# Patient Record
Sex: FEMALE | Race: WHITE | Employment: UNEMPLOYED
[De-identification: names, ages, dates, MRNs, and addresses within clinical notes are randomized per-mention and may not be internally consistent; named-entity substitution may affect disease eponyms.]

---

## 2017-04-13 ENCOUNTER — HOSPITAL ENCOUNTER (OUTPATIENT)
Dept: MRI IMAGING | Facility: CLINIC | Age: 82
Discharge: HOME | End: 2017-04-13
Admitting: INTERNAL MEDICINE
Payer: MEDICARE

## 2017-04-13 DIAGNOSIS — G44.001 INTRACTABLE CLUSTER HEADACHE SYNDROME, UNSPECIFIED CHRONICITY PATTERN: ICD-10-CM

## 2017-04-13 LAB — POC CREATININE: 0.9 MG/DL (ref 0.6–1.4)

## 2017-04-13 PROCEDURE — 82565 ASSAY OF CREATININE: CPT

## 2017-04-13 PROCEDURE — A9579 GAD-BASE MR CONTRAST NOS,1ML: HCPCS | Performed by: INTERNAL MEDICINE

## 2017-04-13 PROCEDURE — 6360000004 HC RX CONTRAST MEDICATION: Performed by: INTERNAL MEDICINE

## 2017-04-13 PROCEDURE — 70553 MRI BRAIN STEM W/O & W/DYE: CPT

## 2017-04-13 RX ADMIN — GADOPENTETATE DIMEGLUMINE 12 ML: 469.01 INJECTION INTRAVENOUS at 09:07

## 2017-04-19 ENCOUNTER — TELEPHONE (OUTPATIENT)
Dept: ONCOLOGY | Age: 82
End: 2017-04-19

## 2017-04-27 ENCOUNTER — HOSPITAL ENCOUNTER (OUTPATIENT)
Dept: INFUSION THERAPY | Facility: MEDICAL CENTER | Age: 82
Discharge: HOME | End: 2017-04-27
Payer: MEDICARE

## 2017-04-27 DIAGNOSIS — D89.9 UNSPECIFIED DISORDER OF IMMUNE MECHANISM: ICD-10-CM

## 2017-04-27 DIAGNOSIS — C50.411 MALIGNANT NEOPLASM OF UPPER-OUTER QUADRANT OF RIGHT FEMALE BREAST (HCC): ICD-10-CM

## 2017-04-27 PROCEDURE — 2580000003 HC RX 258: Performed by: FAMILY MEDICINE

## 2017-04-27 PROCEDURE — 96366 THER/PROPH/DIAG IV INF ADDON: CPT

## 2017-04-27 PROCEDURE — 96365 THER/PROPH/DIAG IV INF INIT: CPT

## 2017-04-27 PROCEDURE — 2500000003 HC RX 250 WO HCPCS: Performed by: INTERNAL MEDICINE

## 2017-04-27 RX ORDER — SODIUM CHLORIDE 0.9 % (FLUSH) 0.9 %
10 SYRINGE (ML) INJECTION PRN
Status: CANCELLED | OUTPATIENT
Start: 2017-04-27

## 2017-04-27 RX ORDER — SODIUM CHLORIDE 9 MG/ML
INJECTION, SOLUTION INTRAVENOUS ONCE
Status: COMPLETED | OUTPATIENT
Start: 2017-04-27 | End: 2017-04-27

## 2017-04-27 RX ORDER — SODIUM CHLORIDE 9 MG/ML
INJECTION, SOLUTION INTRAVENOUS ONCE
Status: CANCELLED | OUTPATIENT
Start: 2017-04-27

## 2017-04-27 RX ADMIN — MAGNESIUM CHLORIDE: 200 INJECTION INTRAVENOUS at 10:22

## 2017-04-27 RX ADMIN — SODIUM CHLORIDE: 9 INJECTION, SOLUTION INTRAVENOUS at 10:23

## 2017-04-27 NOTE — PROGRESS NOTES
Moustapha Sanchez arrives ambulatory with daughter  Orders reviewed for vitamin C infusion  Iv started with #22 cathlon to lt forearm   Good blood return noted  See [facility] for ascorbic acid infusion. After approximately 20 minutes, Moustapha Sanchez c/o pain to iv site.   Infiltrate noted and iv line discontinued with needle intact  Pressure dressing applied  Iv line re started with #22 cathlon to rt hand   Good blood return noted  See MAR for ascorbic acid infusion  Tolerated well  Denies complaints  Iv line discontinued with needle intact  Pressure dressing applied  Discharged per ambulatory with daughter

## 2017-05-04 ENCOUNTER — HOSPITAL ENCOUNTER (OUTPATIENT)
Dept: INFUSION THERAPY | Facility: MEDICAL CENTER | Age: 82
Discharge: HOME | End: 2017-05-04
Payer: MEDICARE

## 2017-05-04 VITALS
RESPIRATION RATE: 20 BRPM | TEMPERATURE: 97.6 F | HEART RATE: 64 BPM | DIASTOLIC BLOOD PRESSURE: 66 MMHG | SYSTOLIC BLOOD PRESSURE: 132 MMHG

## 2017-05-04 DIAGNOSIS — D89.9 UNSPECIFIED DISORDER OF IMMUNE MECHANISM: ICD-10-CM

## 2017-05-04 DIAGNOSIS — C50.411 MALIGNANT NEOPLASM OF UPPER-OUTER QUADRANT OF RIGHT FEMALE BREAST (HCC): ICD-10-CM

## 2017-05-04 PROCEDURE — 96366 THER/PROPH/DIAG IV INF ADDON: CPT

## 2017-05-04 PROCEDURE — 2500000003 HC RX 250 WO HCPCS: Performed by: INTERNAL MEDICINE

## 2017-05-04 PROCEDURE — 96365 THER/PROPH/DIAG IV INF INIT: CPT

## 2017-05-04 RX ORDER — SODIUM CHLORIDE 9 MG/ML
INJECTION, SOLUTION INTRAVENOUS ONCE
Status: CANCELLED | OUTPATIENT
Start: 2017-05-04

## 2017-05-04 RX ORDER — SODIUM CHLORIDE 0.9 % (FLUSH) 0.9 %
10 SYRINGE (ML) INJECTION PRN
Status: CANCELLED | OUTPATIENT
Start: 2017-05-04

## 2017-05-04 RX ADMIN — MAGNESIUM CHLORIDE: 200 INJECTION INTRAVENOUS at 09:28

## 2017-05-04 NOTE — PROGRESS NOTES
Patient arrives to the clinic for infusion today. No complaints. Patient's daughter is with her. IV started into the right hand with # 24 protective. Good blood return obtained. Ascorbic acid infused over 2 .5 hrs. Patient tolerated infusion. IV discontinued and patient leaves in stable condition with daughter.

## 2017-05-11 ENCOUNTER — HOSPITAL ENCOUNTER (OUTPATIENT)
Dept: INFUSION THERAPY | Facility: MEDICAL CENTER | Age: 82
Discharge: HOME | End: 2017-05-11
Payer: MEDICARE

## 2017-05-11 VITALS
HEART RATE: 63 BPM | SYSTOLIC BLOOD PRESSURE: 129 MMHG | DIASTOLIC BLOOD PRESSURE: 63 MMHG | TEMPERATURE: 97.4 F | RESPIRATION RATE: 18 BRPM

## 2017-05-11 DIAGNOSIS — C50.411 MALIGNANT NEOPLASM OF UPPER-OUTER QUADRANT OF RIGHT FEMALE BREAST (HCC): ICD-10-CM

## 2017-05-11 DIAGNOSIS — D89.9 UNSPECIFIED DISORDER OF IMMUNE MECHANISM: ICD-10-CM

## 2017-05-11 PROCEDURE — 96366 THER/PROPH/DIAG IV INF ADDON: CPT

## 2017-05-11 PROCEDURE — 2500000003 HC RX 250 WO HCPCS: Performed by: INTERNAL MEDICINE

## 2017-05-11 PROCEDURE — 96365 THER/PROPH/DIAG IV INF INIT: CPT

## 2017-05-11 PROCEDURE — 2580000003 HC RX 258: Performed by: INTERNAL MEDICINE

## 2017-05-11 RX ORDER — SODIUM CHLORIDE 9 MG/ML
INJECTION, SOLUTION INTRAVENOUS ONCE
Status: CANCELLED | OUTPATIENT
Start: 2017-05-11

## 2017-05-11 RX ORDER — SODIUM CHLORIDE 0.9 % (FLUSH) 0.9 %
10 SYRINGE (ML) INJECTION PRN
Status: CANCELLED | OUTPATIENT
Start: 2017-05-11

## 2017-05-11 RX ORDER — SODIUM CHLORIDE 0.9 % (FLUSH) 0.9 %
10 SYRINGE (ML) INJECTION PRN
Status: DISCONTINUED | OUTPATIENT
Start: 2017-05-11 | End: 2017-05-12 | Stop reason: HOSPADM

## 2017-05-11 RX ORDER — SODIUM CHLORIDE 9 MG/ML
INJECTION, SOLUTION INTRAVENOUS ONCE
Status: COMPLETED | OUTPATIENT
Start: 2017-05-11 | End: 2017-05-11

## 2017-05-11 RX ADMIN — SODIUM CHLORIDE: 9 INJECTION, SOLUTION INTRAVENOUS at 09:00

## 2017-05-11 RX ADMIN — MAGNESIUM CHLORIDE: 200 INJECTION INTRAVENOUS at 09:38

## 2017-05-11 NOTE — IP AVS SNAPSHOT
Patient Information     Patient Name SHERRI Palomares 1934         This is your updated medication list to keep with you all times      ASK your doctor about these medications     ARIMIDEX 1 MG tablet   Generic drug:  anastrozole       XANAX 0.5 MG tablet   Generic drug:  ALPRAZolam

## 2017-05-11 NOTE — IP AVS SNAPSHOT
After Visit Summary  (Discharge Instructions)    Medication List for Home    Based on the information you provided to us as well as any changes during this visit, the following is your updated medication list.  Compare this with your prescription bottles at home. If you have any questions or concerns, contact your primary care physician's office. Daily Medication List (This medication list can be shared with any healthcare provider who is helping you manage your medications)      ASK your doctor about these medications if you have questions        Last Dose    Next Dose Due AM NOON PM NIGHT    ARIMIDEX 1 MG tablet   Generic drug:  anastrozole   Take 1 mg by mouth every other day Pt states 4 times a week                                         XANAX 0.5 MG tablet   Generic drug:  ALPRAZolam   nightly as needed                                                 Allergies as of 5/11/2017     No Known Allergies      Immunizations as of 5/11/2017     No immunizations on file. Last Vitals          Most Recent Value    Temp  97.4 °F (36.3 °C)    Pulse  63    Resp  18    BP  129/63         After Visit Summary    This summary was created for you. Thank you for entrusting your care to us. The following information includes details about your hospital/visit stay along with steps you should take to help with your recovery once you leave the hospital.  In this packet, you will find information about the topics listed below:    · Instructions about your medications including a list of your home medications  · A summary of your hospital visit  · Follow-up appointments once you have left the hospital  · Your care plan at home      You may receive a survey regarding the care you received during your stay. Your input is valuable to us. We encourage you to complete and return your survey in the envelope provided. We hope you will choose us in the future for your healthcare needs. Patient Information     Patient Name SHERRI Cheng 1934      Care Provided at:     Name             [address] [facility] [address]              Your Visit    Here you will find information about your visit, including the reason for your visit. Please take this sheet with you when you visit your doctor or other health care provider in the future. It will help determine the best possible medical care for you at that time. If you have any questions once you leave the hospital, please call the department phone number listed below. Why you were here     Your primary diagnosis was:  Not on File      Visit Information     Date & Time [facility] Phone    [facility]  [phone]       Follow-up Appointments    Below is a list of your follow-up and future appointments. This may not be a complete list as you may have made appointments directly with providers that we are not aware of or your providers may have made some for you. Please call your providers to confirm appointments. It is important to keep your appointments. Please bring your current insurance card, photo ID, co-pay, and all medication bottles to your appointment. If self-pay, payment is expected at the time of service.         Future Appointments     2017 8:30 AM     Appointment with [facility] at [facility]  [phone]       2017 8:30 AM     [facility]  ([phone][facility]   [facility] [place]       2017 8:30 AM     Appointment with [facility] ST[facility] CHA[facility] 18 at . [place] [address] ([phone])   [address] Once You Return Home    This section includes instructions you will need to follow once you leave the hospital.  Your care team will discuss these with you, so you and those caring for you know how to best care for your health needs at home. This section may also include educational information about certain health topics that may be of help to you. MyChart Signup     Impressto allows you to send messages to your doctor, view your test results, renew your prescriptions, schedule appointments, view visit notes, and more. How Do I Sign Up? 1. In your Internet browser, go to [email or web address]  2. Click on the Sign Up Now link in the Sign In box. You will see the New Member Sign Up page. 3. Enter your Impressto Access Code exactly as it appears below. You will not need to use this code after youve completed the sign-up process. If you do not sign up before the expiration date, you must request a new code. Impressto Access Code: [email or web address]  Expires: 7/10/2017  9:07 AM    4. Enter your Social Security Number ([id number]) and Date of Birth (mm/dd/yyyy) as indicated and click Submit. You will be taken to the next sign-up page. 5. Create a Impressto ID. This will be your Impressto login ID and cannot be changed, so think of one that is secure and easy to remember. 6. Create a Impressto password. You can change your password at any time. 7. Enter your Password Reset Question and Answer. This can be used at a later time if you forget your password. 8. Enter your e-mail address. You will receive e-mail notification when new information is available in [employer]. 9. Click Sign Up. You can now view your medical record. Additional Information  If you have questions, please contact the physician practice where you receive care. Remember, Impressto is NOT to be used for urgent needs. For medical emergencies, dial 911. For questions regarding your Impressto account call [phone]. If you have a clinical question, please call your doctor's office.          View your information online ? Review your current list of  medications, immunization, and allergies. ? Review your future test results online . ? Review your discharge instructions provided by your caregivers at discharge    Certain functionality such as prescription refills, scheduling appointments or sending messages to your provider are not activated if your provider does not use [facility] in his/her office    For questions regarding your MyChart account call [phone]. If you have a clinical question, please call your doctor's office. The information on all pages of the After Visit Summary has been reviewed with me, the patient and/or responsible adult, by my health care provider(s). I had the opportunity to ask questions regarding this information. I understand I should dispose of my armband safely at home to protect my health information. A complete copy of the After Visit Summary has been given to me, the patient and/or responsible adult.            Patient Signature/Responsible Adult:____________________    Clinician Signature:_____________________    Date:_____________________    Time:_____________________

## 2017-05-11 NOTE — PROGRESS NOTES
Pt here with her daughter for ascorbic acid over 2 hours. Obtained iv without difficulty, excellent blood return noted. Ordered medication. Infused ascorbic acid 70 grams, magnesium chloride 4 ml over 3 hours. Flushed line, discontinued iv without any complications. Pt back in one week.

## 2017-05-18 ENCOUNTER — HOSPITAL ENCOUNTER (OUTPATIENT)
Dept: INFUSION THERAPY | Facility: MEDICAL CENTER | Age: 82
Discharge: HOME | End: 2017-05-18
Payer: MEDICARE

## 2017-05-18 VITALS
DIASTOLIC BLOOD PRESSURE: 55 MMHG | RESPIRATION RATE: 20 BRPM | TEMPERATURE: 97.7 F | HEART RATE: 63 BPM | SYSTOLIC BLOOD PRESSURE: 110 MMHG

## 2017-05-18 DIAGNOSIS — D89.9 UNSPECIFIED DISORDER OF IMMUNE MECHANISM: ICD-10-CM

## 2017-05-18 DIAGNOSIS — C50.411 MALIGNANT NEOPLASM OF UPPER-OUTER QUADRANT OF RIGHT FEMALE BREAST (HCC): ICD-10-CM

## 2017-05-18 PROCEDURE — 2580000003 HC RX 258: Performed by: INTERNAL MEDICINE

## 2017-05-18 PROCEDURE — 96365 THER/PROPH/DIAG IV INF INIT: CPT

## 2017-05-18 PROCEDURE — 2500000003 HC RX 250 WO HCPCS: Performed by: INTERNAL MEDICINE

## 2017-05-18 PROCEDURE — 96366 THER/PROPH/DIAG IV INF ADDON: CPT

## 2017-05-18 RX ORDER — SODIUM CHLORIDE 9 MG/ML
INJECTION, SOLUTION INTRAVENOUS ONCE
Status: COMPLETED | OUTPATIENT
Start: 2017-05-18 | End: 2017-05-18

## 2017-05-18 RX ORDER — SODIUM CHLORIDE 9 MG/ML
INJECTION, SOLUTION INTRAVENOUS ONCE
Status: CANCELLED | OUTPATIENT
Start: 2017-05-18

## 2017-05-18 RX ORDER — SODIUM CHLORIDE 0.9 % (FLUSH) 0.9 %
10 SYRINGE (ML) INJECTION PRN
Status: CANCELLED | OUTPATIENT
Start: 2017-05-18

## 2017-05-18 RX ADMIN — SODIUM CHLORIDE: 9 INJECTION, SOLUTION INTRAVENOUS at 09:09

## 2017-05-18 RX ADMIN — MAGNESIUM CHLORIDE: 200 INJECTION INTRAVENOUS at 09:08

## 2017-05-18 NOTE — PLAN OF CARE
Problem: Mobility - Impaired:  Intervention: Manage a safe environment  Call light with in reach. All wheels locked on recliner chair to prevent falls. Patient leaves without injury. Intervention: Appropriate assistance to ensure safe transfer  Patient assisted with ambulation to room  Assisted to bathroom when needed.

## 2017-05-18 NOTE — PROGRESS NOTES
Patient arrives to the clinic for infusion of vit C. Complaints of fatigue. Patient has her daughter with her. IV started into the right forearm with # 22 protective. Good blood return obtained. Vitamin c infused over 2.5 hrs. Patient tolerated. IV discontinued and patient leaves in stable condition.

## 2017-05-25 ENCOUNTER — HOSPITAL ENCOUNTER (OUTPATIENT)
Dept: INFUSION THERAPY | Facility: MEDICAL CENTER | Age: 82
Discharge: HOME | End: 2017-05-25
Payer: MEDICARE

## 2017-05-25 VITALS
RESPIRATION RATE: 18 BRPM | TEMPERATURE: 97.7 F | SYSTOLIC BLOOD PRESSURE: 112 MMHG | DIASTOLIC BLOOD PRESSURE: 56 MMHG | HEART RATE: 64 BPM

## 2017-05-25 DIAGNOSIS — D89.9 UNSPECIFIED DISORDER OF IMMUNE MECHANISM: ICD-10-CM

## 2017-05-25 DIAGNOSIS — C50.411 MALIGNANT NEOPLASM OF UPPER-OUTER QUADRANT OF RIGHT FEMALE BREAST (HCC): ICD-10-CM

## 2017-05-25 PROCEDURE — 2580000003 HC RX 258: Performed by: INTERNAL MEDICINE

## 2017-05-25 PROCEDURE — 96365 THER/PROPH/DIAG IV INF INIT: CPT

## 2017-05-25 PROCEDURE — 96366 THER/PROPH/DIAG IV INF ADDON: CPT

## 2017-05-25 PROCEDURE — 2500000003 HC RX 250 WO HCPCS: Performed by: INTERNAL MEDICINE

## 2017-05-25 RX ORDER — SODIUM CHLORIDE 9 MG/ML
INJECTION, SOLUTION INTRAVENOUS ONCE
Status: COMPLETED | OUTPATIENT
Start: 2017-05-25 | End: 2017-05-25

## 2017-05-25 RX ORDER — SODIUM CHLORIDE 9 MG/ML
INJECTION, SOLUTION INTRAVENOUS ONCE
Status: CANCELLED | OUTPATIENT
Start: 2017-05-25

## 2017-05-25 RX ORDER — SODIUM CHLORIDE 0.9 % (FLUSH) 0.9 %
10 SYRINGE (ML) INJECTION PRN
Status: CANCELLED | OUTPATIENT
Start: 2017-05-25

## 2017-05-25 RX ADMIN — MAGNESIUM CHLORIDE: 200 INJECTION INTRAVENOUS at 09:08

## 2017-05-25 RX ADMIN — SODIUM CHLORIDE: 9 INJECTION, SOLUTION INTRAVENOUS at 09:06

## 2017-05-25 NOTE — PROGRESS NOTES
Pt here with her daughterfor ascorbic acid 70 grams magnesium chloride in 700 ml of sterile water for breast cancer. Obtained iv without problems, checked treatment with 2 rn prior to start. Pt completed without difficulty, flushed line, discontinued iv, no problems at site. Pt back next week for treatment.

## 2017-05-25 NOTE — PLAN OF CARE
Problem: Safety:  Goal: Free from accidental physical injury  Free from accidental physical injury  Outcome: Met This Shift  Recliner locks all in locked positions, call light within reach.

## 2017-05-25 NOTE — IP AVS SNAPSHOT
Patient Information     Patient Name SHERRI Silverman 1934         This is your updated medication list to keep with you all times      ASK your doctor about these medications     ARIMIDEX 1 MG tablet   Generic drug:  anastrozole       XANAX 0.5 MG tablet   Generic drug:  ALPRAZolam

## 2017-05-25 NOTE — IP AVS SNAPSHOT
After Visit Summary  (Discharge Instructions)    Medication List for Home    Based on the information you provided to us as well as any changes during this visit, the following is your updated medication list.  Compare this with your prescription bottles at home. If you have any questions or concerns, contact your primary care physician's office. Daily Medication List (This medication list can be shared with any healthcare provider who is helping you manage your medications)      ASK your doctor about these medications if you have questions        Last Dose    Next Dose Due AM NOON PM NIGHT    ARIMIDEX 1 MG tablet   Generic drug:  anastrozole   Take 1 mg by mouth every other day Pt states 4 times a week                                         XANAX 0.5 MG tablet   Generic drug:  ALPRAZolam   nightly as needed                                                 Allergies as of 5/25/2017     No Known Allergies      Immunizations as of 5/25/2017     No immunizations on file. Last Vitals          Most Recent Value    Temp  97.7 °F (36.5 °C)    Pulse  64    Resp  18    BP  (!)  112/56         After Visit Summary    This summary was created for you. Thank you for entrusting your care to us. The following information includes details about your hospital/visit stay along with steps you should take to help with your recovery once you leave the hospital.  In this packet, you will find information about the topics listed below:    · Instructions about your medications including a list of your home medications  · A summary of your hospital visit  · Follow-up appointments once you have left the hospital  · Your care plan at home      You may receive a survey regarding the care you received during your stay. Your input is valuable to us. We encourage you to complete and return your survey in the envelope provided. We hope you will choose us in the future for your healthcare needs. Patient Information     Patient Name SHERRI Cheng 1934      Care Provided at:     Name             [address] [facility] [address]              Your Visit    Here you will find information about your visit, including the reason for your visit. Please take this sheet with you when you visit your doctor or other health care provider in the future. It will help determine the best possible medical care for you at that time. If you have any questions once you leave the hospital, please call the department phone number listed below. Why you were here     Your primary diagnosis was:  Not on File      Visit Information     Date & Time Department Dept. Phone    2017 Jaylan Malik [phone]       Follow-up Appointments    Below is a list of your follow-up and future appointments. This may not be a complete list as you may have made appointments directly with providers that we are not aware of or your providers may have made some for you. Please call your providers to confirm appointments. It is important to keep your appointments. Please bring your current insurance card, photo ID, co-pay, and all medication bottles to your appointment. If self-pay, payment is expected at the time of service. Future Appointments     2017 8:30 AM     Appointment with BHARAT ROSA 18 at . [place] ([phone])   [address] Once You Return Home    This section includes instructions you will need to follow once you leave the hospital.  Your care team will discuss these with you, so you and those caring for you know how to best care for your health needs at home. This section may also include educational information about certain health topics that may be of help to you.             Wizet Signup     [employer] allows you to send messages to your doctor, view your test results, renew your prescriptions, schedule appointments, view visit notes, and more. How Do I Sign Up? 1. In your Internet browser, go to [email or web address]  2. Click on the Sign Up Now link in the Sign In box. You will see the New Member Sign Up page. 3. Enter your Ozmosist Access Code exactly as it appears below. You will not need to use this code after youve completed the sign-up process. If you do not sign up before the expiration date, you must request a new code. OneFineMeal Access Code: [email or web address]  Expires: 7/10/2017  9:07 AM    4. Enter your Social Security Number ([id number]) and Date of Birth (mm/dd/yyyy) as indicated and click Submit. You will be taken to the next sign-up page. 5. Create a OneFineMeal ID. This will be your OneFineMeal login ID and cannot be changed, so think of one that is secure and easy to remember. 6. Create a OneFineMeal password. You can change your password at any time. 7. Enter your Password Reset Question and Answer. This can be used at a later time if you forget your password. 8. Enter your e-mail address. You will receive e-mail notification when new information is available in [address]. 9. Click Sign Up. You can now view your medical record. Additional Information  If you have questions, please contact the physician practice where you receive care. Remember, OneFineMeal is NOT to be used for urgent needs. For medical emergencies, dial 911. For questions regarding your OneFineMeal account call [phone]. If you have a clinical question, please call your doctor's office. View your information online  ? Review your current list of  medications, immunization, and allergies. ? Review your future test results online . ?  Review your discharge instructions provided by your caregivers at discharge    Certain functionality such as prescription refills, scheduling appointments or sending messages to your provider are not activated if your provider does not use [facility] in his/her office    For questions regarding your MyChart account call [phone]. If you have a clinical question, please call your doctor's office. The information on all pages of the After Visit Summary has been reviewed with me, the patient and/or responsible adult, by my health care provider(s). I had the opportunity to ask questions regarding this information. I understand I should dispose of my armband safely at home to protect my health information. A complete copy of the After Visit Summary has been given to me, the patient and/or responsible adult.            Patient Signature/Responsible Adult:____________________    Clinician Signature:_____________________    Date:_____________________    Time:_____________________

## 2017-06-02 ENCOUNTER — HOSPITAL ENCOUNTER (OUTPATIENT)
Dept: INFUSION THERAPY | Facility: MEDICAL CENTER | Age: 82
Discharge: HOME | End: 2017-06-02
Payer: MEDICARE

## 2017-06-02 VITALS
DIASTOLIC BLOOD PRESSURE: 63 MMHG | TEMPERATURE: 97.6 F | HEART RATE: 58 BPM | RESPIRATION RATE: 20 BRPM | SYSTOLIC BLOOD PRESSURE: 119 MMHG

## 2017-06-02 DIAGNOSIS — C50.411 MALIGNANT NEOPLASM OF UPPER-OUTER QUADRANT OF RIGHT FEMALE BREAST (HCC): ICD-10-CM

## 2017-06-02 DIAGNOSIS — D89.9 UNSPECIFIED DISORDER OF IMMUNE MECHANISM: ICD-10-CM

## 2017-06-02 PROCEDURE — 2580000003 HC RX 258: Performed by: INTERNAL MEDICINE

## 2017-06-02 PROCEDURE — 2500000003 HC RX 250 WO HCPCS: Performed by: INTERNAL MEDICINE

## 2017-06-02 PROCEDURE — 96365 THER/PROPH/DIAG IV INF INIT: CPT

## 2017-06-02 PROCEDURE — 96366 THER/PROPH/DIAG IV INF ADDON: CPT

## 2017-06-02 RX ORDER — SODIUM CHLORIDE 9 MG/ML
INJECTION, SOLUTION INTRAVENOUS ONCE
Status: COMPLETED | OUTPATIENT
Start: 2017-06-02 | End: 2017-06-02

## 2017-06-02 RX ORDER — SODIUM CHLORIDE 9 MG/ML
INJECTION, SOLUTION INTRAVENOUS ONCE
Status: CANCELLED | OUTPATIENT
Start: 2017-06-02

## 2017-06-02 RX ORDER — SODIUM CHLORIDE 0.9 % (FLUSH) 0.9 %
10 SYRINGE (ML) INJECTION PRN
Status: CANCELLED | OUTPATIENT
Start: 2017-06-02

## 2017-06-02 RX ADMIN — SODIUM CHLORIDE: 9 INJECTION, SOLUTION INTRAVENOUS at 09:00

## 2017-06-02 RX ADMIN — MAGNESIUM CHLORIDE: 200 INJECTION INTRAVENOUS at 09:10

## 2017-06-02 NOTE — PROGRESS NOTES
Patient arrives to the clinic for hydration today. Patient has her family member with her. Patient complaints of fatigue. IV started into the left forearm with # 24 protective. Good blood return obtained. Vitamin C with magnesium infused over 2.5 hrs. Patient tolerated. No signs of infiltration at the IV site. Patient leaves in stable condition. IV discontinued.

## 2017-06-02 NOTE — PLAN OF CARE
Problem: Mobility - Impaired:  Intervention: Manage a safe environment  Call light is within reach. Recliner chair wheels are in a locked position.

## 2017-06-08 ENCOUNTER — HOSPITAL ENCOUNTER (OUTPATIENT)
Dept: INFUSION THERAPY | Facility: MEDICAL CENTER | Age: 82
Discharge: HOME | End: 2017-06-08
Payer: MEDICARE

## 2017-06-08 VITALS
DIASTOLIC BLOOD PRESSURE: 65 MMHG | RESPIRATION RATE: 20 BRPM | TEMPERATURE: 98.2 F | SYSTOLIC BLOOD PRESSURE: 120 MMHG | HEART RATE: 76 BPM

## 2017-06-08 DIAGNOSIS — D89.9 UNSPECIFIED DISORDER OF IMMUNE MECHANISM: ICD-10-CM

## 2017-06-08 DIAGNOSIS — C50.411 MALIGNANT NEOPLASM OF UPPER-OUTER QUADRANT OF RIGHT FEMALE BREAST (HCC): ICD-10-CM

## 2017-06-08 PROCEDURE — 2500000003 HC RX 250 WO HCPCS: Performed by: INTERNAL MEDICINE

## 2017-06-08 PROCEDURE — 96360 HYDRATION IV INFUSION INIT: CPT

## 2017-06-08 PROCEDURE — 2580000003 HC RX 258: Performed by: INTERNAL MEDICINE

## 2017-06-08 PROCEDURE — 96361 HYDRATE IV INFUSION ADD-ON: CPT

## 2017-06-08 RX ORDER — SODIUM CHLORIDE 9 MG/ML
INJECTION, SOLUTION INTRAVENOUS CONTINUOUS
Status: DISCONTINUED | OUTPATIENT
Start: 2017-06-08 | End: 2017-06-09 | Stop reason: HOSPADM

## 2017-06-08 RX ORDER — SODIUM CHLORIDE 9 MG/ML
INJECTION, SOLUTION INTRAVENOUS ONCE
Status: CANCELLED | OUTPATIENT
Start: 2017-06-08

## 2017-06-08 RX ORDER — SODIUM CHLORIDE 0.9 % (FLUSH) 0.9 %
10 SYRINGE (ML) INJECTION PRN
Status: CANCELLED | OUTPATIENT
Start: 2017-06-08

## 2017-06-08 RX ADMIN — MAGNESIUM CHLORIDE: 200 INJECTION INTRAVENOUS at 09:14

## 2017-06-08 RX ADMIN — SODIUM CHLORIDE: 9 INJECTION, SOLUTION INTRAVENOUS at 09:11

## 2017-06-08 NOTE — PROGRESS NOTES
Pt here for Ascorbic Acid treatments for breast cancer. Pt has no complaints. Started iv without difficulty, excellent blood return. Started treatment at 350 ml/lhr per pt's request.  Pt completed at 191 0029. Flushed line and removed Iv without difficulty,.

## 2017-06-08 NOTE — IP AVS SNAPSHOT
After Visit Summary  (Discharge Instructions)    Medication List for Home    Based on the information you provided to us as well as any changes during this visit, the following is your updated medication list.  Compare this with your prescription bottles at home. If you have any questions or concerns, contact your primary care physician's office. Daily Medication List (This medication list can be shared with any healthcare provider who is helping you manage your medications)      ASK your doctor about these medications if you have questions        Last Dose    Next Dose Due AM NOON PM NIGHT    ARIMIDEX 1 MG tablet   Generic drug:  anastrozole   Take 1 mg by mouth every other day Pt states 4 times a week                                         XANAX 0.5 MG tablet   Generic drug:  ALPRAZolam   nightly as needed                                                 Allergies as of 6/8/2017     No Known Allergies      Immunizations as of 6/8/2017     No immunizations on file. Last Vitals          Most Recent Value    Temp  98.2 °F (36.8 °C)    Pulse  76    Resp  20    BP  120/65         After Visit Summary    This summary was created for you. Thank you for entrusting your care to us. The following information includes details about your hospital/visit stay along with steps you should take to help with your recovery once you leave the hospital.  In this packet, you will find information about the topics listed below:    · Instructions about your medications including a list of your home medications  · A summary of your hospital visit  · Follow-up appointments once you have left the hospital  · Your care plan at home      You may receive a survey regarding the care you received during your stay. Your input is valuable to us. We encourage you to complete and return your survey in the envelope provided. We hope you will choose us in the future for your healthcare needs.           Patient Information Patient Name [address] 1934      Care Provided at:     Name             [address] [facility] [address]              Your Visit    Here you will find information about your visit, including the reason for your visit. Please take this sheet with you when you visit your doctor or other health care provider in the future. It will help determine the best possible medical care for you at that time. If you have any questions once you leave the hospital, please call the department phone number listed below. Why you were here     Your primary diagnosis was:  Not on File      Visit Information     Date & Time Department Dept. Phone    6/8/2017 Steven Romero  [phone]       Follow-up Appointments    Below is a list of your follow-up and future appointments. This may not be a complete list as you may have made appointments directly with providers that we are not aware of or your providers may have made some for you. Please call your providers to confirm appointments. It is important to keep your appointments. Please bring your current insurance card, photo ID, co-pay, and all medication bottles to your appointment. If self-pay, payment is expected at the time of service. Future Appointments     6/15/2017 8:30 AM     Appointment with STV STA CHAIR 18 at . [place] [address] [phone] Counts include 234 beds at the [facility]       6/22/2017 8:30 AM     Appointment with STV STA CHAIR 05 at . [place] [address] [phone] Counts include 234 beds at the [facility]       6/29/2017 8:30 AM     [facility] ([phone][facility]   [facility]         Preventive Care        Date Due    Tetanus Combination Vaccine (1 - Tdap) 12/1/1953    Zoster Vaccine 12/1/1994    Osteoporosis screening or a bone density scan (Dexa) is recommended once at age 72.   Based upon the results and risk factors for bone loss, your provider will recommend whether this needs to be repeated. 12/1/1999    Pneumococcal Vaccines (two) for all adults aged 72 and over (1 of 2 - PCV13) 12/1/1999    Yearly Flu Vaccine (Season Ended) 8/1/2017                 Care Plan Once You Return Home    This section includes instructions you will need to follow once you leave the hospital.  Your care team will discuss these with you, so you and those caring for you know how to best care for your health needs at home. This section may also include educational information about certain health topics that may be of help to you. Vantix Diagnosticshart Signup     Powervation allows you to send messages to your doctor, view your test results, renew your prescriptions, schedule appointments, view visit notes, and more. How Do I Sign Up? 1. In your Internet browser, go to [email or web address]  2. Click on the Sign Up Now link in the Sign In box. You will see the New Member Sign Up page. 3. Enter your Powervation Access Code exactly as it appears below. You will not need to use this code after youve completed the sign-up process. If you do not sign up before the expiration date, you must request a new code. Powervation Access Code: [email or web address]  Expires: 7/10/2017  9:07 AM    4. Enter your Social Security Number (xxx-[id number]) and Date of Birth (mm/dd/yyyy) as indicated and click Submit. You will be taken to the next sign-up page. 5. Create a Powervation ID. This will be your Powervation login ID and cannot be changed, so think of one that is secure and easy to remember. 6. Create a Powervation password. You can change your password at any time. 7. Enter your Password Reset Question and Answer. This can be used at a later time if you forget your password. 8. Enter your e-mail address. You will receive e-mail notification when new information is available in [address] 9. Click Sign Up. You can now view your medical record.      Additional Information

## 2017-06-15 ENCOUNTER — HOSPITAL ENCOUNTER (OUTPATIENT)
Dept: INFUSION THERAPY | Facility: MEDICAL CENTER | Age: 82
Discharge: HOME | End: 2017-06-15
Payer: MEDICARE

## 2017-06-15 VITALS — HEART RATE: 64 BPM | RESPIRATION RATE: 18 BRPM | SYSTOLIC BLOOD PRESSURE: 123 MMHG | DIASTOLIC BLOOD PRESSURE: 67 MMHG

## 2017-06-15 DIAGNOSIS — D89.9 UNSPECIFIED DISORDER OF IMMUNE MECHANISM: ICD-10-CM

## 2017-06-15 DIAGNOSIS — C50.411 MALIGNANT NEOPLASM OF UPPER-OUTER QUADRANT OF RIGHT FEMALE BREAST (HCC): ICD-10-CM

## 2017-06-15 PROCEDURE — 2500000003 HC RX 250 WO HCPCS: Performed by: INTERNAL MEDICINE

## 2017-06-15 PROCEDURE — 96361 HYDRATE IV INFUSION ADD-ON: CPT

## 2017-06-15 PROCEDURE — 96360 HYDRATION IV INFUSION INIT: CPT

## 2017-06-15 PROCEDURE — 2580000003 HC RX 258: Performed by: INTERNAL MEDICINE

## 2017-06-15 RX ORDER — SODIUM CHLORIDE 0.9 % (FLUSH) 0.9 %
10 SYRINGE (ML) INJECTION PRN
Status: CANCELLED | OUTPATIENT
Start: 2017-06-15

## 2017-06-15 RX ORDER — SODIUM CHLORIDE 9 MG/ML
INJECTION, SOLUTION INTRAVENOUS ONCE
Status: COMPLETED | OUTPATIENT
Start: 2017-06-15 | End: 2017-06-15

## 2017-06-15 RX ORDER — SODIUM CHLORIDE 9 MG/ML
INJECTION, SOLUTION INTRAVENOUS ONCE
Status: CANCELLED | OUTPATIENT
Start: 2017-06-15

## 2017-06-15 RX ADMIN — SODIUM CHLORIDE: 9 INJECTION, SOLUTION INTRAVENOUS at 09:06

## 2017-06-15 RX ADMIN — MAGNESIUM CHLORIDE: 200 INJECTION INTRAVENOUS at 09:02

## 2017-06-15 NOTE — PROGRESS NOTES
Pt. Arrives for infusion , denies c/o since last infusion. IV started without difficulty  Ascorbic acid started to infuse @ 350ml/hr  Infusion completed without difficulty.

## 2017-06-15 NOTE — PLAN OF CARE
Problem: SAFETY  Goal: Free from accidental physical injury  Call light within reach, no recent falls assessment completed.

## 2017-06-22 ENCOUNTER — HOSPITAL ENCOUNTER (OUTPATIENT)
Dept: INFUSION THERAPY | Facility: MEDICAL CENTER | Age: 82
Discharge: HOME | End: 2017-06-22
Payer: MEDICARE

## 2017-06-22 VITALS
TEMPERATURE: 97.8 F | SYSTOLIC BLOOD PRESSURE: 119 MMHG | HEART RATE: 72 BPM | RESPIRATION RATE: 20 BRPM | DIASTOLIC BLOOD PRESSURE: 61 MMHG

## 2017-06-22 DIAGNOSIS — C50.411 MALIGNANT NEOPLASM OF UPPER-OUTER QUADRANT OF RIGHT FEMALE BREAST (HCC): ICD-10-CM

## 2017-06-22 DIAGNOSIS — D89.9 UNSPECIFIED DISORDER OF IMMUNE MECHANISM: ICD-10-CM

## 2017-06-22 PROCEDURE — 96361 HYDRATE IV INFUSION ADD-ON: CPT

## 2017-06-22 PROCEDURE — 96360 HYDRATION IV INFUSION INIT: CPT

## 2017-06-22 PROCEDURE — 2580000003 HC RX 258: Performed by: INTERNAL MEDICINE

## 2017-06-22 PROCEDURE — 2500000003 HC RX 250 WO HCPCS: Performed by: INTERNAL MEDICINE

## 2017-06-22 RX ORDER — SODIUM CHLORIDE 9 MG/ML
INJECTION, SOLUTION INTRAVENOUS ONCE
Status: COMPLETED | OUTPATIENT
Start: 2017-06-22 | End: 2017-06-22

## 2017-06-22 RX ORDER — SODIUM CHLORIDE 9 MG/ML
INJECTION, SOLUTION INTRAVENOUS ONCE
Status: CANCELLED | OUTPATIENT
Start: 2017-06-22

## 2017-06-22 RX ORDER — SODIUM CHLORIDE 0.9 % (FLUSH) 0.9 %
10 SYRINGE (ML) INJECTION PRN
Status: CANCELLED | OUTPATIENT
Start: 2017-06-22

## 2017-06-22 RX ADMIN — MAGNESIUM CHLORIDE: 200 INJECTION INTRAVENOUS at 09:19

## 2017-06-22 RX ADMIN — SODIUM CHLORIDE: 9 INJECTION, SOLUTION INTRAVENOUS at 09:19

## 2017-06-22 NOTE — PROGRESS NOTES
Patient arrives to the clinic for hydration today. Patient complaints of fatigue  IV started into the right forearm with # 22 protective. Good blood return obtained. Vitamin C with magnesium infused over 3 hrs. Patient tolerated. Patient is requesting a copy of her last 12 treatments. Records given to patient's daughter. IV discontinued and patient leaves in stable condition.

## 2017-06-29 ENCOUNTER — HOSPITAL ENCOUNTER (OUTPATIENT)
Dept: INFUSION THERAPY | Facility: MEDICAL CENTER | Age: 82
Discharge: HOME | End: 2017-06-29
Payer: MEDICARE

## 2017-06-29 VITALS
RESPIRATION RATE: 20 BRPM | SYSTOLIC BLOOD PRESSURE: 112 MMHG | DIASTOLIC BLOOD PRESSURE: 69 MMHG | TEMPERATURE: 97.8 F | HEART RATE: 57 BPM

## 2017-06-29 DIAGNOSIS — C50.411 MALIGNANT NEOPLASM OF UPPER-OUTER QUADRANT OF RIGHT FEMALE BREAST (HCC): ICD-10-CM

## 2017-06-29 DIAGNOSIS — D89.9 UNSPECIFIED DISORDER OF IMMUNE MECHANISM: ICD-10-CM

## 2017-06-29 PROCEDURE — 96365 THER/PROPH/DIAG IV INF INIT: CPT

## 2017-06-29 PROCEDURE — 96366 THER/PROPH/DIAG IV INF ADDON: CPT

## 2017-06-29 PROCEDURE — 2500000003 HC RX 250 WO HCPCS: Performed by: INTERNAL MEDICINE

## 2017-06-29 RX ORDER — SODIUM CHLORIDE 9 MG/ML
INJECTION, SOLUTION INTRAVENOUS ONCE
Status: CANCELLED | OUTPATIENT
Start: 2017-06-29

## 2017-06-29 RX ORDER — SODIUM CHLORIDE 0.9 % (FLUSH) 0.9 %
10 SYRINGE (ML) INJECTION PRN
Status: CANCELLED | OUTPATIENT
Start: 2017-06-29

## 2017-06-29 RX ORDER — SODIUM CHLORIDE 9 MG/ML
INJECTION, SOLUTION INTRAVENOUS ONCE
Status: DISCONTINUED | OUTPATIENT
Start: 2017-06-29 | End: 2017-06-30 | Stop reason: HOSPADM

## 2017-06-29 RX ADMIN — MAGNESIUM CHLORIDE: 200 INJECTION INTRAVENOUS at 09:05

## 2017-06-29 NOTE — PROGRESS NOTES
Jared Flanagan here per ambulatory with her daughter for vitamin C treatment. Jared Flanagan has no complaints. Pt hands new order for the next 12 weeks of treatment. She stated, \" he didn't put magnesium chloride in the treatment. \"  \" I called and left a message at the office, about it,\" \"could the pharmacists call the office to get order for the magnesium. \"  Spoke with Abril Dwyer in pharmacy, she stated, \" the pharmacy can no longer get the magnesium chloride from the supplier. \"  \"Maybe Katherine Pierce called and informed the office, we can no longer get magnesium chloride from the . \"  Informed her and her daughter that pharmacy can no longer get magnesium chloride from the , and this could be why Md did not order the Magnesium chloride, pt verbalized understanding. Started IV without difficulty, checked medication with 2 RN, infusing at 350 ml/hr as requested pt and family. Pt completed without difficulty. Removed iv without problems, pt back in one week for new orders of treatment.

## 2017-06-29 NOTE — IP AVS SNAPSHOT
After Visit Summary  (Discharge Instructions)    Medication List for Home    Based on the information you provided to us as well as any changes during this visit, the following is your updated medication list.  Compare this with your prescription bottles at home. If you have any questions or concerns, contact your primary care physician's office. Daily Medication List (This medication list can be shared with any healthcare provider who is helping you manage your medications)      ASK your doctor about these medications if you have questions        Last Dose    Next Dose Due AM NOON PM NIGHT    ARIMIDEX 1 MG tablet   Generic drug:  anastrozole   Take 1 mg by mouth every other day Pt states 4 times a week                                         XANAX 0.5 MG tablet   Generic drug:  ALPRAZolam   nightly as needed                                                 Allergies as of 6/29/2017     No Known Allergies      Immunizations as of 6/29/2017     No immunizations on file. After Visit Summary    This summary was created for you. Thank you for entrusting your care to us. The following information includes details about your hospital/visit stay along with steps you should take to help with your recovery once you leave the hospital.  In this packet, you will find information about the topics listed below:    · Instructions about your medications including a list of your home medications  · A summary of your hospital visit  · Follow-up appointments once you have left the hospital  · Your care plan at home      You may receive a survey regarding the care you received during your stay. Your input is valuable to us. We encourage you to complete and return your survey in the envelope provided. We hope you will choose us in the future for your healthcare needs.           Patient Information     Patient Name [address] [place] [address] 1934      Care Provided at:     Name [facility] [facility]              Your Visit    Here you will find information about your visit, including the reason for your visit. Please take this sheet with you when you visit your doctor or other health care provider in the future. It will help determine the best possible medical care for you at that time. If you have any questions once you leave the hospital, please call the department phone number listed below. Why you were here     Your primary diagnosis was:  Not on File      Visit Information     Date & Time Department Dept. Phone    6/29/2017 Jaylan Malik [phone]       Follow-up Appointments    Below is a list of your follow-up and future appointments. This may not be a complete list as you may have made appointments directly with providers that we are not aware of or your providers may have made some for you. Please call your providers to confirm appointments. It is important to keep your appointments. Please bring your current insurance card, photo ID, co-pay, and all medication bottles to your appointment. If self-pay, payment is expected at the time of service. Future Appointments     6/29/2017  8:30 AM     Appointment with [facility] 18 at . Nick Malik [phone] [facility]       7/6/2017 8:30 AM     Appointment with [facility] 10 at . Nick Malik ([phone])   [address] [place] [address]       7/13/2017 8:30 AM     Appointment with [facility] 18 at . Nick Malik ([phone])   [address] [place] [address]         Preventive Care        Date Due    Tetanus Combination Vaccine (1 - Tdap) 12/1/1953    Zoster Vaccine 12/1/1994    Osteoporosis screening or a bone density scan (Dexa) is recommended once at age 72. Based upon the results and risk factors for bone loss, your provider will recommend whether this needs to be repeated.  12/1/1999 Pneumococcal Vaccines (two) for all adults aged 72 and over (1 of 2 - PCV13) 12/1/1999    Yearly Flu Vaccine (Season Ended) 8/1/2017                 Care Plan Once You Return Home    This section includes instructions you will need to follow once you leave the hospital.  Your care team will discuss these with you, so you and those caring for you know how to best care for your health needs at home. This section may also include educational information about certain health topics that may be of help to you. Arkamihart Signup     UserVoice allows you to send messages to your doctor, view your test results, renew your prescriptions, schedule appointments, view visit notes, and more. How Do I Sign Up? 1. In your Internet browser, go to [email or web address]  2. Click on the Sign Up Now link in the Sign In box. You will see the New Member Sign Up page. 3. Enter your UserVoice Access Code exactly as it appears below. You will not need to use this code after youve completed the sign-up process. If you do not sign up before the expiration date, you must request a new code. UserVoice Access Code: [email or web address]  Expires: 7/10/2017  9:07 AM    4. Enter your Social Security Number ([id number]) and Date of Birth (mm/dd/yyyy) as indicated and click Submit. You will be taken to the next sign-up page. 5. Create a UserVoice ID. This will be your UserVoice login ID and cannot be changed, so think of one that is secure and easy to remember. 6. Create a UserVoice password. You can change your password at any time. 7. Enter your Password Reset Question and Answer. This can be used at a later time if you forget your password. 8. Enter your e-mail address. You will receive e-mail notification when new information is available in [address] 9. Click Sign Up. You can now view your medical record.      Additional Information  If you have questions, please contact the physician practice where you receive care. Remember, MyChart is NOT to be used for urgent needs. For medical emergencies, dial 911. For questions regarding your MyChart account call [phone]. If you have a clinical question, please call your doctor's office. View your information online  ? Review your current list of  medications, immunization, and allergies. ? Review your future test results online . ? Review your discharge instructions provided by your caregivers at discharge    Certain functionality such as prescription refills, scheduling appointments or sending messages to your provider are not activated if your provider does not use CarePrivepass in his/her office    For questions regarding your MyChart account call [phone]. If you have a clinical question, please call your doctor's office. The information on all pages of the After Visit Summary has been reviewed with me, the patient and/or responsible adult, by my health care provider(s). I had the opportunity to ask questions regarding this information. I understand I should dispose of my armband safely at home to protect my health information. A complete copy of the After Visit Summary has been given to me, the patient and/or responsible adult.            Patient Signature/Responsible Adult:____________________    Clinician Signature:_____________________    Date:_____________________    Time:_____________________

## 2017-07-06 ENCOUNTER — HOSPITAL ENCOUNTER (OUTPATIENT)
Dept: INFUSION THERAPY | Facility: MEDICAL CENTER | Age: 82
Discharge: HOME | End: 2017-07-06
Payer: MEDICARE

## 2017-07-13 ENCOUNTER — HOSPITAL ENCOUNTER (OUTPATIENT)
Dept: INFUSION THERAPY | Facility: MEDICAL CENTER | Age: 82
Discharge: HOME | End: 2017-07-13
Payer: MEDICARE

## 2017-07-13 VITALS
DIASTOLIC BLOOD PRESSURE: 63 MMHG | SYSTOLIC BLOOD PRESSURE: 133 MMHG | RESPIRATION RATE: 18 BRPM | TEMPERATURE: 97.5 F | HEART RATE: 63 BPM

## 2017-07-13 DIAGNOSIS — D89.9 UNSPECIFIED DISORDER OF IMMUNE MECHANISM: ICD-10-CM

## 2017-07-13 DIAGNOSIS — C50.411 MALIGNANT NEOPLASM OF UPPER-OUTER QUADRANT OF RIGHT FEMALE BREAST (HCC): ICD-10-CM

## 2017-07-13 PROCEDURE — 6360000002 HC RX W HCPCS: Performed by: INTERNAL MEDICINE

## 2017-07-13 PROCEDURE — 2500000003 HC RX 250 WO HCPCS: Performed by: INTERNAL MEDICINE

## 2017-07-13 PROCEDURE — 96365 THER/PROPH/DIAG IV INF INIT: CPT

## 2017-07-13 PROCEDURE — 96366 THER/PROPH/DIAG IV INF ADDON: CPT

## 2017-07-13 RX ORDER — SODIUM CHLORIDE 0.9 % (FLUSH) 0.9 %
10 SYRINGE (ML) INJECTION PRN
Status: CANCELLED | OUTPATIENT
Start: 2017-07-13

## 2017-07-13 RX ORDER — SODIUM CHLORIDE 9 MG/ML
INJECTION, SOLUTION INTRAVENOUS ONCE
Status: CANCELLED | OUTPATIENT
Start: 2017-07-13

## 2017-07-13 RX ADMIN — ASCORBIC ACID: 500 INJECTION, SOLUTION INTRAMUSCULAR; INTRAVENOUS; SUBCUTANEOUS at 08:55

## 2017-07-13 NOTE — PROGRESS NOTES
Pt. Denies c/o or concerns  IV started without difficulty. Infusion strted to infuse over 3 hours.   IV infusion completed without difficulty

## 2017-07-20 ENCOUNTER — HOSPITAL ENCOUNTER (OUTPATIENT)
Dept: INFUSION THERAPY | Facility: MEDICAL CENTER | Age: 82
Discharge: HOME | End: 2017-07-20
Payer: MEDICARE

## 2017-07-20 VITALS
HEART RATE: 63 BPM | SYSTOLIC BLOOD PRESSURE: 129 MMHG | RESPIRATION RATE: 20 BRPM | TEMPERATURE: 97.6 F | DIASTOLIC BLOOD PRESSURE: 61 MMHG

## 2017-07-20 DIAGNOSIS — C50.411 MALIGNANT NEOPLASM OF UPPER-OUTER QUADRANT OF RIGHT FEMALE BREAST (HCC): ICD-10-CM

## 2017-07-20 DIAGNOSIS — D89.9 UNSPECIFIED DISORDER OF IMMUNE MECHANISM: ICD-10-CM

## 2017-07-20 PROCEDURE — 6360000002 HC RX W HCPCS: Performed by: INTERNAL MEDICINE

## 2017-07-20 PROCEDURE — 96360 HYDRATION IV INFUSION INIT: CPT

## 2017-07-20 PROCEDURE — 2500000003 HC RX 250 WO HCPCS: Performed by: INTERNAL MEDICINE

## 2017-07-20 PROCEDURE — 96361 HYDRATE IV INFUSION ADD-ON: CPT

## 2017-07-20 RX ORDER — SODIUM CHLORIDE 0.9 % (FLUSH) 0.9 %
10 SYRINGE (ML) INJECTION PRN
Status: CANCELLED | OUTPATIENT
Start: 2017-07-20

## 2017-07-20 RX ORDER — SODIUM CHLORIDE 9 MG/ML
INJECTION, SOLUTION INTRAVENOUS ONCE
Status: CANCELLED | OUTPATIENT
Start: 2017-07-20

## 2017-07-20 RX ADMIN — ASCORBIC ACID: 500 INJECTION, SOLUTION INTRAMUSCULAR; INTRAVENOUS; SUBCUTANEOUS at 09:25

## 2017-07-20 NOTE — PROGRESS NOTES
Patient arrives to the clinic for infusion. No complaints voiced. IV started into the right forearm with # 22 protective. Good blood return obtained. Hydration infused over 4 hrs. Patient tolerated. IV discontinued and patient leaves in stable condition.

## 2017-07-20 NOTE — FLOWSHEET NOTE
Patient and family present. Patient shared photos of her grandchild. Patient shared that she feels \"pretty good\" and has been keeping \"very busy. \"  offered support and presence. Chaplains will remain available to offer spiritual and emotional support as needed.      07/20/17 1500   Encounter Summary   Services provided to: Patient and family together   Referral/Consult From: [facility]   Continue Visiting (07/20/17)   Complexity of Encounter Moderate   Length of Encounter 15 minutes   Routine   Type Follow up   Assessment Approachable   Intervention Active listening;Explored feelings, thoughts, concerns;Explored coping resources;Nurtured hope;Provided reading materials/devotional materials;Sustaining presence/ Ministry of presence   Outcome Engaged in [employer]

## 2017-07-27 ENCOUNTER — HOSPITAL ENCOUNTER (OUTPATIENT)
Dept: INFUSION THERAPY | Facility: MEDICAL CENTER | Age: 82
Discharge: HOME | End: 2017-07-27
Payer: MEDICARE

## 2017-07-27 VITALS
HEART RATE: 63 BPM | RESPIRATION RATE: 20 BRPM | TEMPERATURE: 97.7 F | DIASTOLIC BLOOD PRESSURE: 70 MMHG | SYSTOLIC BLOOD PRESSURE: 129 MMHG

## 2017-07-27 DIAGNOSIS — D89.9 UNSPECIFIED DISORDER OF IMMUNE MECHANISM: ICD-10-CM

## 2017-07-27 DIAGNOSIS — C50.411 MALIGNANT NEOPLASM OF UPPER-OUTER QUADRANT OF RIGHT FEMALE BREAST (HCC): ICD-10-CM

## 2017-07-27 PROCEDURE — 96365 THER/PROPH/DIAG IV INF INIT: CPT

## 2017-07-27 PROCEDURE — 96361 HYDRATE IV INFUSION ADD-ON: CPT

## 2017-07-27 PROCEDURE — 6360000002 HC RX W HCPCS: Performed by: INTERNAL MEDICINE

## 2017-07-27 PROCEDURE — 2500000003 HC RX 250 WO HCPCS: Performed by: INTERNAL MEDICINE

## 2017-07-27 RX ORDER — SODIUM CHLORIDE 9 MG/ML
INJECTION, SOLUTION INTRAVENOUS ONCE
Status: CANCELLED | OUTPATIENT
Start: 2017-07-27

## 2017-07-27 RX ORDER — SODIUM CHLORIDE 0.9 % (FLUSH) 0.9 %
10 SYRINGE (ML) INJECTION PRN
Status: CANCELLED | OUTPATIENT
Start: 2017-07-27

## 2017-07-27 RX ADMIN — ASCORBIC ACID: 500 INJECTION, SOLUTION INTRAMUSCULAR; INTRAVENOUS; SUBCUTANEOUS at 09:05

## 2017-07-27 NOTE — PROGRESS NOTES
Patient arrives to the clinic for infusion. C/o fatigue. IV started into the right forearm with # 24 protective .good blood return obtained. Hydration infused over 2.5 hrs. Patient tolerated. IV discontinued and patient leaves in stable condition.

## 2017-08-04 ENCOUNTER — HOSPITAL ENCOUNTER (OUTPATIENT)
Dept: INFUSION THERAPY | Facility: MEDICAL CENTER | Age: 82
Discharge: HOME | End: 2017-08-04
Payer: MEDICARE

## 2017-08-04 VITALS
DIASTOLIC BLOOD PRESSURE: 64 MMHG | RESPIRATION RATE: 20 BRPM | SYSTOLIC BLOOD PRESSURE: 124 MMHG | TEMPERATURE: 98 F | HEART RATE: 65 BPM

## 2017-08-04 DIAGNOSIS — C50.411 MALIGNANT NEOPLASM OF UPPER-OUTER QUADRANT OF RIGHT FEMALE BREAST (HCC): ICD-10-CM

## 2017-08-04 DIAGNOSIS — D89.9 DISORDER INVOLVING IMMUNE MECHANISM (HCC): ICD-10-CM

## 2017-08-04 PROCEDURE — 6360000002 HC RX W HCPCS: Performed by: INTERNAL MEDICINE

## 2017-08-04 PROCEDURE — 2580000003 HC RX 258: Performed by: INTERNAL MEDICINE

## 2017-08-04 PROCEDURE — 96366 THER/PROPH/DIAG IV INF ADDON: CPT

## 2017-08-04 PROCEDURE — 2500000003 HC RX 250 WO HCPCS: Performed by: INTERNAL MEDICINE

## 2017-08-04 PROCEDURE — 96365 THER/PROPH/DIAG IV INF INIT: CPT

## 2017-08-04 RX ORDER — SODIUM CHLORIDE 0.9 % (FLUSH) 0.9 %
10 SYRINGE (ML) INJECTION PRN
Status: CANCELLED | OUTPATIENT
Start: 2017-08-04

## 2017-08-04 RX ORDER — SODIUM CHLORIDE 9 MG/ML
INJECTION, SOLUTION INTRAVENOUS ONCE
Status: CANCELLED | OUTPATIENT
Start: 2017-08-04

## 2017-08-04 RX ORDER — SODIUM CHLORIDE 9 MG/ML
INJECTION, SOLUTION INTRAVENOUS ONCE
Status: COMPLETED | OUTPATIENT
Start: 2017-08-04 | End: 2017-08-04

## 2017-08-04 RX ADMIN — ASCORBIC ACID: 500 INJECTION, SOLUTION INTRAMUSCULAR; INTRAVENOUS; SUBCUTANEOUS at 09:09

## 2017-08-04 RX ADMIN — SODIUM CHLORIDE: 9 INJECTION, SOLUTION INTRAVENOUS at 08:45

## 2017-08-10 ENCOUNTER — HOSPITAL ENCOUNTER (OUTPATIENT)
Dept: INFUSION THERAPY | Facility: MEDICAL CENTER | Age: 82
Discharge: HOME | End: 2017-08-10
Payer: MEDICARE

## 2017-08-10 VITALS
HEART RATE: 66 BPM | DIASTOLIC BLOOD PRESSURE: 66 MMHG | RESPIRATION RATE: 18 BRPM | SYSTOLIC BLOOD PRESSURE: 133 MMHG | TEMPERATURE: 97.6 F

## 2017-08-10 DIAGNOSIS — C50.411 MALIGNANT NEOPLASM OF UPPER-OUTER QUADRANT OF RIGHT FEMALE BREAST (HCC): ICD-10-CM

## 2017-08-10 DIAGNOSIS — D89.9 DISORDER INVOLVING IMMUNE MECHANISM (HCC): ICD-10-CM

## 2017-08-10 PROCEDURE — 96366 THER/PROPH/DIAG IV INF ADDON: CPT

## 2017-08-10 PROCEDURE — 96365 THER/PROPH/DIAG IV INF INIT: CPT

## 2017-08-10 PROCEDURE — 6360000002 HC RX W HCPCS: Performed by: INTERNAL MEDICINE

## 2017-08-10 PROCEDURE — 2500000003 HC RX 250 WO HCPCS: Performed by: INTERNAL MEDICINE

## 2017-08-10 PROCEDURE — 2580000003 HC RX 258: Performed by: INTERNAL MEDICINE

## 2017-08-10 RX ORDER — SODIUM CHLORIDE 0.9 % (FLUSH) 0.9 %
10 SYRINGE (ML) INJECTION PRN
Status: CANCELLED | OUTPATIENT
Start: 2017-08-10

## 2017-08-10 RX ORDER — SODIUM CHLORIDE 9 MG/ML
INJECTION, SOLUTION INTRAVENOUS ONCE
Status: CANCELLED | OUTPATIENT
Start: 2017-08-10

## 2017-08-10 RX ORDER — SODIUM CHLORIDE 9 MG/ML
INJECTION, SOLUTION INTRAVENOUS ONCE
Status: COMPLETED | OUTPATIENT
Start: 2017-08-10 | End: 2017-08-10

## 2017-08-10 RX ADMIN — SODIUM CHLORIDE: 9 INJECTION, SOLUTION INTRAVENOUS at 09:22

## 2017-08-10 RX ADMIN — ASCORBIC ACID: 500 INJECTION, SOLUTION INTRAMUSCULAR; INTRAVENOUS; SUBCUTANEOUS at 09:23

## 2017-08-10 NOTE — PROGRESS NOTES
Telly Levi arrives ambulatory with daughter  Orders reviewed  Iv started with #24 cathlon to lt wrist area per policy    Good blood return noted  See [facility] for Vitamin C infusion   Tolerated well   Denies complaints  Iv line flushed post infusion  Iv line discontinued with needle intact  Pressure dressing applied  Discharged per ambulatory

## 2017-08-17 ENCOUNTER — HOSPITAL ENCOUNTER (OUTPATIENT)
Dept: INFUSION THERAPY | Facility: MEDICAL CENTER | Age: 82
Discharge: HOME | End: 2017-08-17
Payer: MEDICARE

## 2017-08-17 VITALS
RESPIRATION RATE: 20 BRPM | SYSTOLIC BLOOD PRESSURE: 136 MMHG | HEART RATE: 57 BPM | DIASTOLIC BLOOD PRESSURE: 66 MMHG | TEMPERATURE: 97.6 F

## 2017-08-17 DIAGNOSIS — C50.411 MALIGNANT NEOPLASM OF UPPER-OUTER QUADRANT OF RIGHT FEMALE BREAST (HCC): ICD-10-CM

## 2017-08-17 DIAGNOSIS — D89.9 DISORDER INVOLVING IMMUNE MECHANISM (HCC): ICD-10-CM

## 2017-08-17 PROCEDURE — 6360000002 HC RX W HCPCS: Performed by: INTERNAL MEDICINE

## 2017-08-17 PROCEDURE — 2500000003 HC RX 250 WO HCPCS: Performed by: INTERNAL MEDICINE

## 2017-08-17 PROCEDURE — 96361 HYDRATE IV INFUSION ADD-ON: CPT

## 2017-08-17 PROCEDURE — 96360 HYDRATION IV INFUSION INIT: CPT

## 2017-08-17 RX ORDER — SODIUM CHLORIDE 0.9 % (FLUSH) 0.9 %
10 SYRINGE (ML) INJECTION PRN
Status: CANCELLED | OUTPATIENT
Start: 2017-08-17

## 2017-08-17 RX ORDER — SODIUM CHLORIDE 9 MG/ML
INJECTION, SOLUTION INTRAVENOUS ONCE
Status: CANCELLED | OUTPATIENT
Start: 2017-08-17

## 2017-08-17 RX ADMIN — ASCORBIC ACID: 500 INJECTION, SOLUTION INTRAMUSCULAR; INTRAVENOUS; SUBCUTANEOUS at 09:21

## 2017-08-24 ENCOUNTER — HOSPITAL ENCOUNTER (OUTPATIENT)
Dept: INFUSION THERAPY | Facility: MEDICAL CENTER | Age: 82
Discharge: HOME | End: 2017-08-24
Payer: MEDICARE

## 2017-08-24 VITALS
SYSTOLIC BLOOD PRESSURE: 130 MMHG | TEMPERATURE: 97.5 F | DIASTOLIC BLOOD PRESSURE: 62 MMHG | RESPIRATION RATE: 20 BRPM | HEART RATE: 61 BPM

## 2017-08-24 DIAGNOSIS — D89.9 DISORDER INVOLVING IMMUNE MECHANISM (HCC): ICD-10-CM

## 2017-08-24 DIAGNOSIS — C50.411 MALIGNANT NEOPLASM OF UPPER-OUTER QUADRANT OF RIGHT FEMALE BREAST (HCC): ICD-10-CM

## 2017-08-24 PROCEDURE — 2500000003 HC RX 250 WO HCPCS: Performed by: INTERNAL MEDICINE

## 2017-08-24 PROCEDURE — 96361 HYDRATE IV INFUSION ADD-ON: CPT

## 2017-08-24 PROCEDURE — 96360 HYDRATION IV INFUSION INIT: CPT

## 2017-08-24 PROCEDURE — 6360000002 HC RX W HCPCS: Performed by: INTERNAL MEDICINE

## 2017-08-24 RX ORDER — SODIUM CHLORIDE 0.9 % (FLUSH) 0.9 %
10 SYRINGE (ML) INJECTION PRN
Status: CANCELLED | OUTPATIENT
Start: 2017-08-24

## 2017-08-24 RX ORDER — SODIUM CHLORIDE 9 MG/ML
INJECTION, SOLUTION INTRAVENOUS ONCE
Status: CANCELLED | OUTPATIENT
Start: 2017-08-24

## 2017-08-24 RX ADMIN — ASCORBIC ACID: 500 INJECTION, SOLUTION INTRAMUSCULAR; INTRAVENOUS; SUBCUTANEOUS at 09:06

## 2017-08-24 NOTE — PROGRESS NOTES
Patient arrives to the clinic for infusion today. Patient states that she has a cramp in her leg and it woke her up this morning. Patient has her family member with her. IV started into the left forearm with # 22 protective. Good blood return obtained. Ascorbic acid with magnesium sulfate infused over 2.5 hrs. Patient tolerated infusion. IV discontinued and patient leaves in stable condition. Next appointment reviewed.

## 2017-08-31 ENCOUNTER — HOSPITAL ENCOUNTER (OUTPATIENT)
Dept: INFUSION THERAPY | Facility: MEDICAL CENTER | Age: 82
Discharge: HOME | End: 2017-08-31
Payer: MEDICARE

## 2017-08-31 VITALS
TEMPERATURE: 97.6 F | HEART RATE: 66 BPM | RESPIRATION RATE: 20 BRPM | DIASTOLIC BLOOD PRESSURE: 71 MMHG | SYSTOLIC BLOOD PRESSURE: 126 MMHG

## 2017-08-31 DIAGNOSIS — D89.9 DISORDER INVOLVING IMMUNE MECHANISM (HCC): ICD-10-CM

## 2017-08-31 DIAGNOSIS — C50.411 MALIGNANT NEOPLASM OF UPPER-OUTER QUADRANT OF RIGHT FEMALE BREAST (HCC): ICD-10-CM

## 2017-08-31 PROCEDURE — 6360000002 HC RX W HCPCS: Performed by: INTERNAL MEDICINE

## 2017-08-31 PROCEDURE — 2500000003 HC RX 250 WO HCPCS: Performed by: INTERNAL MEDICINE

## 2017-08-31 PROCEDURE — 96361 HYDRATE IV INFUSION ADD-ON: CPT

## 2017-08-31 PROCEDURE — 96360 HYDRATION IV INFUSION INIT: CPT

## 2017-08-31 RX ORDER — SODIUM CHLORIDE 9 MG/ML
INJECTION, SOLUTION INTRAVENOUS ONCE
Status: CANCELLED | OUTPATIENT
Start: 2017-08-31

## 2017-08-31 RX ORDER — SODIUM CHLORIDE 0.9 % (FLUSH) 0.9 %
10 SYRINGE (ML) INJECTION PRN
Status: CANCELLED | OUTPATIENT
Start: 2017-08-31

## 2017-08-31 RX ADMIN — ASCORBIC ACID: 500 INJECTION, SOLUTION INTRAMUSCULAR; INTRAVENOUS; SUBCUTANEOUS at 09:06

## 2017-08-31 NOTE — FLOWSHEET NOTE
Patient and daughter in law together.  offered support and presence. Chaplains will remain available to offer spiritual and emotional support as needed.      08/31/17 1415   Encounter Summary   Services provided to: Patient and family together   Referral/Consult From: [facility]   Continue Visiting (08/31/17)   Complexity of Encounter Low   Length of Encounter 15 minutes   Routine   Type Follow up   Assessment Approachable   Intervention Active listening;Explored feelings, thoughts, concerns;Explored coping resources;Nurtured hope;Provided reading materials/devotional materials;Sustaining presence/ Ministry of presence   Outcome Engaged in conversation;Expressed feelings/needs/concerns;Coping

## 2017-08-31 NOTE — PROGRESS NOTES
Patient arrives to the clinic for infusion today. No complaints. No leg cramping this week. IV started into the right forearm with # 22 protective. Good blood return obtained. Ascorbic acid infusion administered over 2.5 hrs. Patient request that infusion run slower than 2 hrs. Patient tolerated infusion. IV discontinued. Patient leaves in stable condition with her daughter.

## 2017-09-07 ENCOUNTER — HOSPITAL ENCOUNTER (OUTPATIENT)
Dept: INFUSION THERAPY | Facility: MEDICAL CENTER | Age: 82
Discharge: HOME | End: 2017-09-07
Payer: MEDICARE

## 2017-09-07 VITALS
SYSTOLIC BLOOD PRESSURE: 119 MMHG | DIASTOLIC BLOOD PRESSURE: 61 MMHG | RESPIRATION RATE: 20 BRPM | HEART RATE: 64 BPM | TEMPERATURE: 97.4 F

## 2017-09-07 DIAGNOSIS — D89.9 DISORDER INVOLVING IMMUNE MECHANISM (HCC): ICD-10-CM

## 2017-09-07 DIAGNOSIS — C50.411 MALIGNANT NEOPLASM OF UPPER-OUTER QUADRANT OF RIGHT FEMALE BREAST (HCC): ICD-10-CM

## 2017-09-07 PROCEDURE — 2500000003 HC RX 250 WO HCPCS: Performed by: INTERNAL MEDICINE

## 2017-09-07 PROCEDURE — 6360000002 HC RX W HCPCS: Performed by: INTERNAL MEDICINE

## 2017-09-07 PROCEDURE — 96366 THER/PROPH/DIAG IV INF ADDON: CPT

## 2017-09-07 PROCEDURE — 2580000003 HC RX 258: Performed by: INTERNAL MEDICINE

## 2017-09-07 PROCEDURE — 96365 THER/PROPH/DIAG IV INF INIT: CPT

## 2017-09-07 RX ORDER — SODIUM CHLORIDE 0.9 % (FLUSH) 0.9 %
10 SYRINGE (ML) INJECTION PRN
Status: CANCELLED | OUTPATIENT
Start: 2017-09-07

## 2017-09-07 RX ORDER — SODIUM CHLORIDE 9 MG/ML
INJECTION, SOLUTION INTRAVENOUS ONCE
Status: COMPLETED | OUTPATIENT
Start: 2017-09-07 | End: 2017-09-07

## 2017-09-07 RX ORDER — SODIUM CHLORIDE 9 MG/ML
INJECTION, SOLUTION INTRAVENOUS ONCE
Status: CANCELLED | OUTPATIENT
Start: 2017-09-07

## 2017-09-07 RX ADMIN — ASCORBIC ACID: 500 INJECTION, SOLUTION INTRAMUSCULAR; INTRAVENOUS; SUBCUTANEOUS at 08:55

## 2017-09-07 RX ADMIN — SODIUM CHLORIDE: 9 INJECTION, SOLUTION INTRAVENOUS at 08:50

## 2017-09-07 NOTE — PROGRESS NOTES
Patient here for IV infusion. Feels well today. Vitals obtained. IV started. Ascorbic acid solution hung per MAR>  Infusing without complaints. Sleeping on and off. Completed. Tolerated well. IV discontinued intact, band aid to site. Has a return visit scheduled. Discharged ambulatory with her daughter.

## 2017-09-07 NOTE — IP AVS SNAPSHOT
After Visit Summary  (Discharge Instructions)    Medication List for Home    Based on the information you provided to us as well as any changes during this visit, the following is your updated medication list.  Compare this with your prescription bottles at home. If you have any questions or concerns, contact your primary care physician's office. Daily Medication List (This medication list can be shared with any healthcare provider who is helping you manage your medications)      ASK your doctor about these medications if you have questions        Last Dose    Next Dose Due AM NOON PM NIGHT    ARIMIDEX 1 MG tablet   Generic drug:  anastrozole   Take 1 mg by mouth every other day Pt states 4 times a week                                         XANAX 0.5 MG tablet   Generic drug:  ALPRAZolam   nightly as needed                                                 Allergies as of 9/7/2017     No Known Allergies      Immunizations as of 9/7/2017     No immunizations on file. Last Vitals          Most Recent Value    Temp  97.4 °F (36.3 °C)    Pulse  64    Resp  20    BP  119/61         After Visit Summary    This summary was created for you. Thank you for entrusting your care to us. The following information includes details about your hospital/visit stay along with steps you should take to help with your recovery once you leave the hospital.  In this packet, you will find information about the topics listed below:    · Instructions about your medications including a list of your home medications  · A summary of your hospital visit  · Follow-up appointments once you have left the hospital  · Your care plan at home      You may receive a survey regarding the care you received during your stay. Your input is valuable to us. We encourage you to complete and return your survey in the envelope provided. We hope you will choose us in the future for your healthcare needs.           Patient Information Patient Name [address] 1934      Care Provided at:     Name             [facility]              Your Visit    Here you will find information about your visit, including the reason for your visit. Please take this sheet with you when you visit your doctor or other health care provider in the future. It will help determine the best possible medical care for you at that time. If you have any questions once you leave the hospital, please call the department phone number listed below. Why you were here     Your primary diagnosis was:  Not on File      Visit Information     Date & Time Department Dept. Phone    9/7/2017 Steven Romero  [phone]       Follow-up Appointments    Below is a list of your follow-up and future appointments. This may not be a complete list as you may have made appointments directly with providers that we are not aware of or your providers may have made some for you. Please call your providers to confirm appointments. It is important to keep your appointments. Please bring your current insurance card, photo ID, co-pay, and all medication bottles to your appointment. If self-pay, payment is expected at the time of service. Future Appointments     9/14/2017 8:30 AM     [phone]       9/22/2017 9:00 AM     Appointment with [facility] 09 at . [facility] [phone] [facility]       9/29/2017 9:00 AM     Appointment with [facility] CHAIR 11 at . Nick  [phone]) [address]         Preventive Care        Date Due    Tetanus Combination Vaccine (1 - Tdap) 12/1/1953    Zoster Vaccine 12/1/1994    Osteoporosis screening or a bone density scan (Dexa) is recommended once at age 72.   Based upon the results and risk factors for bone loss, your provider will recommend whether this needs to be repeated. 12/1/1999    Pneumococcal Vaccines (two) for all adults aged 72 and over (1 of 2 - PCV13) 12/1/1999    Yearly Flu Vaccine (1) 9/1/2017                 Care Plan Once You Return Home    This section includes instructions you will need to follow once you leave the hospital.  Your care team will discuss these with you, so you and those caring for you know how to best care for your health needs at home. This section may also include educational information about certain health topics that may be of help to you. Discharge Instructions       Verbalized understanding of discharge instructions, follow-up appointments, and when to call the physician. MyChart Signup     TopDown [employer] allows you to send messages to your doctor, view your test results, renew your prescriptions, schedule appointments, view visit notes, and more. How Do I Sign Up? 1. In your Internet browser, go to [email or web address]  2. Click on the Sign Up Now link in the Sign In box. You will see the New Member Sign Up page. 3. Enter your TopDown Conservation Access Code exactly as it appears below. You will not need to use this code after youve completed the sign-up process. If you do not sign up before the expiration date, you must request a new code. TopDown Conservation Access Code: [email or web address]  Expires: 10/3/2017  9:30 AM    4. Enter your Social Security Number (xxx-[id number]) and Date of Birth (mm/dd/yyyy) as indicated and click Submit. You will be taken to the next sign-up page. 5. Create a TopDown Conservation ID. This will be your TopDown Conservation login ID and cannot be changed, so think of one that is secure and easy to remember. 6. Create a TopDown Conservation password. You can change your password at any time. 7. Enter your Password Reset Question and Answer. This can be used at a later time if you forget your password. 8. Enter your e-mail address.  You will receive e-mail notification when new information is available in [employer] 9. Click Sign Up. You can now view your medical record. Additional Information  If you have questions, please contact the physician practice where you receive care. Remember, Vita Soundt is NOT to be used for urgent needs. For medical emergencies, dial 911. For questions regarding your Fantomhart account call [phone]. If you have a clinical question, please call your doctor's office. View your information online  ? Review your current list of  medications, immunization, and allergies. ? Review your future test results online . ? Review your discharge instructions provided by your caregivers at discharge    Certain functionality such as prescription refills, scheduling appointments or sending messages to your provider are not activated if your provider does not use D[employer] in his/her office    For questions regarding your Vita Soundt account call [phone]. If you have a clinical question, please call your doctor's office. The information on all pages of the After Visit Summary has been reviewed with me, the patient and/or responsible adult, by my health care provider(s). I had the opportunity to ask questions regarding this information. I understand I should dispose of my armband safely at home to protect my health information. A complete copy of the After Visit Summary has been given to me, the patient and/or responsible adult.            Patient Signature/Responsible Adult:____________________    Clinician Signature:_____________________    Date:_____________________    Time:_____________________

## 2017-09-07 NOTE — FLOWSHEET NOTE
rounding; offered support and presence. Chaplains will remain available to offer spiritual and emotional support as needed.      09/07/17 1403   Encounter Summary   Services provided to: Patient and family together   Referral/Consult From: [facility]   Continue Visiting (09/07/17)   Complexity of Encounter Low   Length of Encounter 15 minutes   Routine   Type Follow up   Assessment Approachable   Intervention Active listening;Explored feelings, thoughts, concerns;Explored coping resources;Nurtured hope   Outcome Engaged in conversation

## 2017-09-07 NOTE — IP AVS SNAPSHOT
Patient Information     Patient Name SHERRI Calvert 1934         This is your updated medication list to keep with you all times      ASK your doctor about these medications     ARIMIDEX 1 MG tablet   Generic drug:  anastrozole       XANAX 0.5 MG tablet   Generic drug:  ALPRAZolam

## 2017-09-14 ENCOUNTER — HOSPITAL ENCOUNTER (OUTPATIENT)
Dept: INFUSION THERAPY | Facility: MEDICAL CENTER | Age: 82
Discharge: HOME | End: 2017-09-14
Payer: MEDICARE

## 2017-09-14 VITALS
HEART RATE: 69 BPM | RESPIRATION RATE: 20 BRPM | TEMPERATURE: 97.8 F | DIASTOLIC BLOOD PRESSURE: 59 MMHG | SYSTOLIC BLOOD PRESSURE: 118 MMHG

## 2017-09-14 DIAGNOSIS — D89.9 DISORDER INVOLVING IMMUNE MECHANISM (HCC): ICD-10-CM

## 2017-09-14 DIAGNOSIS — C50.411 MALIGNANT NEOPLASM OF UPPER-OUTER QUADRANT OF RIGHT FEMALE BREAST (HCC): ICD-10-CM

## 2017-09-14 PROCEDURE — 2500000003 HC RX 250 WO HCPCS: Performed by: INTERNAL MEDICINE

## 2017-09-14 PROCEDURE — 96365 THER/PROPH/DIAG IV INF INIT: CPT

## 2017-09-14 PROCEDURE — 96366 THER/PROPH/DIAG IV INF ADDON: CPT

## 2017-09-14 RX ORDER — SODIUM CHLORIDE 9 MG/ML
INJECTION, SOLUTION INTRAVENOUS ONCE
Status: DISCONTINUED | OUTPATIENT
Start: 2017-09-14 | End: 2017-09-15 | Stop reason: HOSPADM

## 2017-09-14 RX ORDER — SODIUM CHLORIDE 0.9 % (FLUSH) 0.9 %
10 SYRINGE (ML) INJECTION PRN
Status: CANCELLED | OUTPATIENT
Start: 2017-09-14

## 2017-09-14 RX ORDER — SODIUM CHLORIDE 9 MG/ML
INJECTION, SOLUTION INTRAVENOUS ONCE
Status: CANCELLED | OUTPATIENT
Start: 2017-09-14

## 2017-09-14 RX ADMIN — MAGNESIUM CHLORIDE: 200 INJECTION INTRAVENOUS at 08:54

## 2017-09-14 NOTE — PROGRESS NOTES
Patient arrives to the clinic for infusion today. No complaints voiced. IV started into the right forearm with # 22 protective. Good blood return. Ascorbic acid infused over 2.5 hrs. Patient tolerated. Patient request infusion to run a little slower. IV discontinued and patient leaves in stable condition.

## 2017-09-21 ENCOUNTER — APPOINTMENT (OUTPATIENT)
Dept: INFUSION THERAPY | Facility: MEDICAL CENTER | Age: 82
End: 2017-09-21
Payer: MEDICARE

## 2017-09-22 ENCOUNTER — HOSPITAL ENCOUNTER (OUTPATIENT)
Dept: INFUSION THERAPY | Facility: MEDICAL CENTER | Age: 82
Discharge: HOME | End: 2017-09-22
Payer: MEDICARE

## 2017-09-22 VITALS
RESPIRATION RATE: 16 BRPM | DIASTOLIC BLOOD PRESSURE: 60 MMHG | TEMPERATURE: 97.4 F | HEART RATE: 70 BPM | SYSTOLIC BLOOD PRESSURE: 110 MMHG

## 2017-09-22 DIAGNOSIS — D89.9 DISORDER INVOLVING IMMUNE MECHANISM (HCC): ICD-10-CM

## 2017-09-22 DIAGNOSIS — C50.411 MALIGNANT NEOPLASM OF UPPER-OUTER QUADRANT OF RIGHT FEMALE BREAST (HCC): ICD-10-CM

## 2017-09-22 PROCEDURE — 96360 HYDRATION IV INFUSION INIT: CPT

## 2017-09-22 PROCEDURE — 96361 HYDRATE IV INFUSION ADD-ON: CPT

## 2017-09-22 PROCEDURE — 2580000003 HC RX 258: Performed by: INTERNAL MEDICINE

## 2017-09-22 PROCEDURE — 2500000003 HC RX 250 WO HCPCS: Performed by: INTERNAL MEDICINE

## 2017-09-22 RX ORDER — SODIUM CHLORIDE 9 MG/ML
INJECTION, SOLUTION INTRAVENOUS ONCE
Status: CANCELLED | OUTPATIENT
Start: 2017-09-22

## 2017-09-22 RX ORDER — SODIUM CHLORIDE 9 MG/ML
INJECTION, SOLUTION INTRAVENOUS ONCE
Status: COMPLETED | OUTPATIENT
Start: 2017-09-22 | End: 2017-09-22

## 2017-09-22 RX ORDER — SODIUM CHLORIDE 0.9 % (FLUSH) 0.9 %
10 SYRINGE (ML) INJECTION PRN
Status: CANCELLED | OUTPATIENT
Start: 2017-09-22

## 2017-09-22 RX ADMIN — MAGNESIUM CHLORIDE: 200 INJECTION INTRAVENOUS at 09:36

## 2017-09-22 RX ADMIN — SODIUM CHLORIDE: 9 INJECTION, SOLUTION INTRAVENOUS at 09:36

## 2017-09-22 NOTE — PROGRESS NOTES
Grace Limon arrives ambulatory with daughter  Orders reviewed for vitamin C  Iv started with #22 cathlon to rt forearm per policy   Good blood return noted  See [facility] for ascorbic acid and magnesium infusion  During infusion of ascorbic acid and magnesium, iv infiltated.   Iv stopped and re started with #22 cathlon to rt hand per policy  Infusion completed and iv line discontinued  Pressure dressing applied  Discharged per ambulatory

## 2017-09-28 ENCOUNTER — APPOINTMENT (OUTPATIENT)
Dept: INFUSION THERAPY | Facility: MEDICAL CENTER | Age: 82
End: 2017-09-28
Payer: MEDICARE

## 2017-09-29 ENCOUNTER — HOSPITAL ENCOUNTER (OUTPATIENT)
Dept: INFUSION THERAPY | Facility: MEDICAL CENTER | Age: 82
Discharge: HOME | End: 2017-09-29
Payer: MEDICARE

## 2017-09-29 VITALS
HEART RATE: 63 BPM | RESPIRATION RATE: 18 BRPM | DIASTOLIC BLOOD PRESSURE: 60 MMHG | SYSTOLIC BLOOD PRESSURE: 122 MMHG | TEMPERATURE: 97.6 F

## 2017-09-29 DIAGNOSIS — D89.9 DISORDER INVOLVING IMMUNE MECHANISM (HCC): ICD-10-CM

## 2017-09-29 DIAGNOSIS — C50.411 MALIGNANT NEOPLASM OF UPPER-OUTER QUADRANT OF RIGHT FEMALE BREAST (HCC): ICD-10-CM

## 2017-09-29 PROCEDURE — 96365 THER/PROPH/DIAG IV INF INIT: CPT

## 2017-09-29 PROCEDURE — 96366 THER/PROPH/DIAG IV INF ADDON: CPT

## 2017-09-29 PROCEDURE — 2500000003 HC RX 250 WO HCPCS: Performed by: INTERNAL MEDICINE

## 2017-09-29 PROCEDURE — 2580000003 HC RX 258: Performed by: INTERNAL MEDICINE

## 2017-09-29 RX ORDER — SODIUM CHLORIDE 0.9 % (FLUSH) 0.9 %
10 SYRINGE (ML) INJECTION PRN
Status: CANCELLED | OUTPATIENT
Start: 2017-09-29

## 2017-09-29 RX ORDER — SODIUM CHLORIDE 9 MG/ML
INJECTION, SOLUTION INTRAVENOUS ONCE
Status: COMPLETED | OUTPATIENT
Start: 2017-09-29 | End: 2017-09-29

## 2017-09-29 RX ORDER — SODIUM CHLORIDE 9 MG/ML
INJECTION, SOLUTION INTRAVENOUS ONCE
Status: CANCELLED | OUTPATIENT
Start: 2017-09-29

## 2017-09-29 RX ADMIN — MAGNESIUM CHLORIDE: 200 INJECTION INTRAVENOUS at 09:57

## 2017-09-29 RX ADMIN — SODIUM CHLORIDE: 9 INJECTION, SOLUTION INTRAVENOUS at 09:57

## 2017-09-29 NOTE — IP AVS SNAPSHOT
Patient Information     Patient Name SHERRI Knight 1934         This is your updated medication list to keep with you all times      ASK your doctor about these medications     ARIMIDEX 1 MG tablet   Generic drug:  anastrozole       XANAX 0.5 MG tablet   Generic drug:  ALPRAZolam

## 2017-09-29 NOTE — IP AVS SNAPSHOT
After Visit Summary  (Discharge Instructions)    Medication List for Home    Based on the information you provided to us as well as any changes during this visit, the following is your updated medication list.  Compare this with your prescription bottles at home. If you have any questions or concerns, contact your primary care physician's office. Daily Medication List (This medication list can be shared with any healthcare provider who is helping you manage your medications)      ASK your doctor about these medications if you have questions        Last Dose    Next Dose Due AM NOON PM NIGHT    ARIMIDEX 1 MG tablet   Generic drug:  anastrozole   Take 1 mg by mouth every other day Pt states 4 times a week                                         XANAX 0.5 MG tablet   Generic drug:  ALPRAZolam   nightly as needed                                                 Allergies as of 9/29/2017     No Known Allergies      Immunizations as of 9/29/2017     No immunizations on file. Last Vitals          Most Recent Value    Temp  97.6 °F (36.4 °C)    Pulse  63    Resp  18    BP  122/60         After Visit Summary    This summary was created for you. Thank you for entrusting your care to us. The following information includes details about your hospital/visit stay along with steps you should take to help with your recovery once you leave the hospital.  In this packet, you will find information about the topics listed below:    · Instructions about your medications including a list of your home medications  · A summary of your hospital visit  · Follow-up appointments once you have left the hospital  · Your care plan at home      You may receive a survey regarding the care you received during your stay. Your input is valuable to us. We encourage you to complete and return your survey in the envelope provided. We hope you will choose us in the future for your healthcare needs. Patient Information     Patient Name SHERRI Regalado 1934      Care Provided at:     Name             [facility]              Your Visit    Here you will find information about your visit, including the reason for your visit. Please take this sheet with you when you visit your doctor or other health care provider in the future. It will help determine the best possible medical care for you at that time. If you have any questions once you leave the hospital, please call the department phone number listed below. Why you were here     Your primary diagnosis was:  Not on File      Visit Information     Date & Time Department Dept. Phone    [facility] [phone]       Follow-up Appointments    Below is a list of your follow-up and future appointments. This may not be a complete list as you may have made appointments directly with providers that we are not aware of or your providers may have made some for you. Please call your providers to confirm appointments. It is important to keep your appointments. Please bring your current insurance card, photo ID, co-pay, and all medication bottles to your appointment. If self-pay, payment is expected at the time of service.         Future Appointments     10/6/2017 9:00 AM     Appointment with [facility] 12 at [place] [address] [phone] [facility]       10/13/2017 9:00 AM     Appointment with [facility] 16 at . [place] [address] ([phone])   [address] [place] [address]       10/20/2017 8:30 AM     Appointment with [facility] 14 at [place] [address] ([phone])   [address] [place] [address]       10/27/2017 8:30 AM     Appointment with [facility] ST[facility] 09 at [place] [address] ([phone])   [address] [place] [address]         Preventive Care        Date Due    Tetanus Combination Vaccine (1 - Tdap) 1953 Zoster Vaccine 12/1/1994    Osteoporosis screening or a bone density scan (Dexa) is recommended once at age 72. Based upon the results and risk factors for bone loss, your provider will recommend whether this needs to be repeated. 12/1/1999    Pneumococcal Vaccines (two) for all adults aged 72 and over (1 of 2 - PCV13) 12/1/1999    Yearly Flu Vaccine (1) 9/1/2017                 Care Plan Once You Return Home    This section includes instructions you will need to follow once you leave the hospital.  Your care team will discuss these with you, so you and those caring for you know how to best care for your health needs at home. This section may also include educational information about certain health topics that may be of help to you. Discharge Instructions       Verbalized understanding of discharge instructions, follow-up appointments, and when to call the physician. [employer] Signup     [employer] allows you to send messages to your doctor, view your test results, renew your prescriptions, schedule appointments, view visit notes, and more. How Do I Sign Up? 1. In your Internet browser, go to [email or web address]  2. Click on the Sign Up Now link in the Sign In box. You will see the New Member Sign Up page. 3. Enter your [employer] Access Code exactly as it appears below. You will not need to use this code after youve completed the sign-up process. If you do not sign up before the expiration date, you must request a new code. [employer] Access Code: [email or web address]  Expires: 10/3/2017  9:30 AM    4. Enter your Social Security Number ([id number]) and Date of Birth (mm/dd/yyyy) as indicated and click Submit. You will be taken to the next sign-up page. 5. Create a [employer] ID. This will be your [employer] login ID and cannot be changed, so think of one that is secure and easy to remember. 6. Create a [employer] password. You can change your password at any time. 7. Enter your Password Reset Question and Answer. This can be used at a later time if you forget your password. 8. Enter your e-mail address. You will receive e-mail notification when new information is available in [address] 9. Click Sign Up. You can now view your medical record. Additional Information  If you have questions, please contact the physician practice where you receive care. Remember, MyChart is NOT to be used for urgent needs. For medical emergencies, dial 911. For questions regarding your MyChart account call [phone]. If you have a clinical question, please call your doctor's office. View your information online  ? Review your current list of  medications, immunization, and allergies. ? Review your future test results online . ? Review your discharge instructions provided by your caregivers at discharge    Certain functionality such as prescription refills, scheduling appointments or sending messages to your provider are not activated if your provider does not use [employer] in his/her office    For questions regarding your MyChart account call [phone]. If you have a clinical question, please call your doctor's office. The information on all pages of the After Visit Summary has been reviewed with me, the patient and/or responsible adult, by my health care provider(s). I had the opportunity to ask questions regarding this information. I understand I should dispose of my armband safely at home to protect my health information. A complete copy of the After Visit Summary has been given to me, the patient and/or responsible adult.            Patient Signature/Responsible Adult:____________________    Clinician Signature:_____________________    Date:_____________________    Time:_____________________

## 2017-09-29 NOTE — PROGRESS NOTES
Patient arrives per ambulation with her daughter for vitamin C infusion. Orders reviewed. Tolerated infusion well, no complaints voiced ,no reaction. Appointment calendar given to patient. Discharged per ambulation with her daughter. RTC as scheduled 10/6/17.

## 2017-10-06 ENCOUNTER — HOSPITAL ENCOUNTER (OUTPATIENT)
Dept: INFUSION THERAPY | Facility: MEDICAL CENTER | Age: 82
Discharge: HOME | End: 2017-10-06
Payer: MEDICARE

## 2017-10-06 VITALS
SYSTOLIC BLOOD PRESSURE: 115 MMHG | TEMPERATURE: 98.5 F | HEART RATE: 68 BPM | RESPIRATION RATE: 20 BRPM | DIASTOLIC BLOOD PRESSURE: 73 MMHG

## 2017-10-06 DIAGNOSIS — D89.9 DISORDER INVOLVING IMMUNE MECHANISM (HCC): ICD-10-CM

## 2017-10-06 DIAGNOSIS — C50.411 MALIGNANT NEOPLASM OF UPPER-OUTER QUADRANT OF RIGHT FEMALE BREAST (HCC): ICD-10-CM

## 2017-10-06 PROCEDURE — 96365 THER/PROPH/DIAG IV INF INIT: CPT

## 2017-10-06 PROCEDURE — 2500000003 HC RX 250 WO HCPCS: Performed by: INTERNAL MEDICINE

## 2017-10-06 PROCEDURE — 96366 THER/PROPH/DIAG IV INF ADDON: CPT

## 2017-10-06 PROCEDURE — 2580000003 HC RX 258: Performed by: INTERNAL MEDICINE

## 2017-10-06 RX ORDER — SODIUM CHLORIDE 9 MG/ML
INJECTION, SOLUTION INTRAVENOUS ONCE
Status: COMPLETED | OUTPATIENT
Start: 2017-10-06 | End: 2017-10-06

## 2017-10-06 RX ORDER — SODIUM CHLORIDE 9 MG/ML
INJECTION, SOLUTION INTRAVENOUS ONCE
Status: CANCELLED | OUTPATIENT
Start: 2017-10-06

## 2017-10-06 RX ORDER — SODIUM CHLORIDE 0.9 % (FLUSH) 0.9 %
10 SYRINGE (ML) INJECTION PRN
Status: CANCELLED | OUTPATIENT
Start: 2017-10-06

## 2017-10-06 RX ADMIN — MAGNESIUM CHLORIDE: 200 INJECTION INTRAVENOUS at 09:36

## 2017-10-06 RX ADMIN — SODIUM CHLORIDE: 9 INJECTION, SOLUTION INTRAVENOUS at 09:37

## 2017-10-06 NOTE — PROGRESS NOTES
Patient arrives to the clinic for infusion today. Patient has her daughter with her. No complaints. IV started into the right forearm. Good blood return obtained. Ascorbic acid with magnesium IV infused over 2.5 hrs. Patient request longer run time. IV discontinued and patient leaves in stable condition with her family member.

## 2017-10-12 ENCOUNTER — HOSPITAL ENCOUNTER (OUTPATIENT)
Dept: INFUSION THERAPY | Facility: MEDICAL CENTER | Age: 82
Discharge: HOME | End: 2017-10-12
Payer: MEDICARE

## 2017-10-12 VITALS
TEMPERATURE: 98 F | SYSTOLIC BLOOD PRESSURE: 120 MMHG | DIASTOLIC BLOOD PRESSURE: 69 MMHG | RESPIRATION RATE: 18 BRPM | HEART RATE: 77 BPM

## 2017-10-12 DIAGNOSIS — D89.9 DISORDER INVOLVING IMMUNE MECHANISM (HCC): ICD-10-CM

## 2017-10-12 DIAGNOSIS — C50.411 MALIGNANT NEOPLASM OF UPPER-OUTER QUADRANT OF RIGHT FEMALE BREAST (HCC): ICD-10-CM

## 2017-10-12 PROCEDURE — 96365 THER/PROPH/DIAG IV INF INIT: CPT

## 2017-10-12 PROCEDURE — 96366 THER/PROPH/DIAG IV INF ADDON: CPT

## 2017-10-12 PROCEDURE — 2500000003 HC RX 250 WO HCPCS: Performed by: INTERNAL MEDICINE

## 2017-10-12 RX ORDER — SODIUM CHLORIDE 0.9 % (FLUSH) 0.9 %
10 SYRINGE (ML) INJECTION PRN
Status: CANCELLED | OUTPATIENT
Start: 2017-10-12

## 2017-10-12 RX ORDER — SODIUM CHLORIDE 9 MG/ML
INJECTION, SOLUTION INTRAVENOUS ONCE
Status: CANCELLED | OUTPATIENT
Start: 2017-10-12

## 2017-10-12 RX ORDER — SODIUM CHLORIDE 9 MG/ML
INJECTION, SOLUTION INTRAVENOUS ONCE
Status: DISCONTINUED | OUTPATIENT
Start: 2017-10-12 | End: 2017-10-13 | Stop reason: HOSPADM

## 2017-10-12 RX ADMIN — MAGNESIUM CHLORIDE: 200 INJECTION INTRAVENOUS at 09:00

## 2017-10-12 NOTE — PROGRESS NOTES
Juliana Nails here with her daughter for Ascorbic Acid with Magnesium Chloride for breast cancer treatment. Pt denies problems, no new medications. Obtained iv to left lower arm without difficulty. Started infusion at 350 ml/hr as requested, infusing without problems. Pt voiced no complaints during infusion. Watching tv, and visiting with her daughter. Pt ate lunch. Completed infusion.   Discontinued iv without problems/

## 2017-10-20 ENCOUNTER — HOSPITAL ENCOUNTER (OUTPATIENT)
Dept: INFUSION THERAPY | Facility: MEDICAL CENTER | Age: 82
Discharge: HOME | End: 2017-10-20
Payer: MEDICARE

## 2017-10-20 VITALS
SYSTOLIC BLOOD PRESSURE: 139 MMHG | HEART RATE: 65 BPM | DIASTOLIC BLOOD PRESSURE: 65 MMHG | TEMPERATURE: 98.3 F | RESPIRATION RATE: 20 BRPM

## 2017-10-20 DIAGNOSIS — D89.9 DISORDER INVOLVING IMMUNE MECHANISM (HCC): ICD-10-CM

## 2017-10-20 PROCEDURE — 2580000003 HC RX 258: Performed by: INTERNAL MEDICINE

## 2017-10-20 PROCEDURE — 2500000003 HC RX 250 WO HCPCS: Performed by: INTERNAL MEDICINE

## 2017-10-20 PROCEDURE — 96365 THER/PROPH/DIAG IV INF INIT: CPT

## 2017-10-20 PROCEDURE — 96366 THER/PROPH/DIAG IV INF ADDON: CPT

## 2017-10-20 RX ORDER — SODIUM CHLORIDE 9 MG/ML
INJECTION, SOLUTION INTRAVENOUS ONCE
Status: COMPLETED | OUTPATIENT
Start: 2017-10-20 | End: 2017-10-20

## 2017-10-20 RX ORDER — SODIUM CHLORIDE 0.9 % (FLUSH) 0.9 %
10 SYRINGE (ML) INJECTION PRN
Status: CANCELLED | OUTPATIENT
Start: 2017-10-20

## 2017-10-20 RX ORDER — SODIUM CHLORIDE 9 MG/ML
INJECTION, SOLUTION INTRAVENOUS ONCE
Status: CANCELLED | OUTPATIENT
Start: 2017-10-20

## 2017-10-20 RX ADMIN — MAGNESIUM CHLORIDE: 200 INJECTION INTRAVENOUS at 09:03

## 2017-10-20 RX ADMIN — SODIUM CHLORIDE: 9 INJECTION, SOLUTION INTRAVENOUS at 08:45

## 2017-10-20 NOTE — PROGRESS NOTES
Patient here for infusion therapy. Feels well today. Vitals obtained. IV started. Vitamin C bag hung per MAR. Patient requests 2-3 hour infusion instead of a flat 2 hours. Infusing. Sleeping on and off. Completed. Tolerated well. IV discontinued intact, dressing to site. Has a return visit scheduled. Discharged ambulatory with her daughter.

## 2017-10-27 ENCOUNTER — HOSPITAL ENCOUNTER (OUTPATIENT)
Dept: INFUSION THERAPY | Facility: MEDICAL CENTER | Age: 82
Discharge: HOME | End: 2017-10-27
Payer: MEDICARE

## 2017-10-27 VITALS
RESPIRATION RATE: 20 BRPM | TEMPERATURE: 98 F | HEART RATE: 61 BPM | SYSTOLIC BLOOD PRESSURE: 129 MMHG | DIASTOLIC BLOOD PRESSURE: 62 MMHG

## 2017-10-27 DIAGNOSIS — D89.9 DISORDER INVOLVING IMMUNE MECHANISM (HCC): ICD-10-CM

## 2017-10-27 PROCEDURE — 96366 THER/PROPH/DIAG IV INF ADDON: CPT

## 2017-10-27 PROCEDURE — 2500000003 HC RX 250 WO HCPCS: Performed by: INTERNAL MEDICINE

## 2017-10-27 PROCEDURE — 96365 THER/PROPH/DIAG IV INF INIT: CPT

## 2017-10-27 PROCEDURE — 2580000003 HC RX 258: Performed by: INTERNAL MEDICINE

## 2017-10-27 RX ORDER — SODIUM CHLORIDE 9 MG/ML
INJECTION, SOLUTION INTRAVENOUS ONCE
Status: COMPLETED | OUTPATIENT
Start: 2017-10-27 | End: 2017-10-27

## 2017-10-27 RX ORDER — SODIUM CHLORIDE 0.9 % (FLUSH) 0.9 %
10 SYRINGE (ML) INJECTION PRN
Status: CANCELLED | OUTPATIENT
Start: 2017-10-27

## 2017-10-27 RX ORDER — SODIUM CHLORIDE 9 MG/ML
INJECTION, SOLUTION INTRAVENOUS ONCE
Status: CANCELLED | OUTPATIENT
Start: 2017-10-27

## 2017-10-27 RX ADMIN — SODIUM CHLORIDE: 9 INJECTION, SOLUTION INTRAVENOUS at 08:57

## 2017-10-27 RX ADMIN — MAGNESIUM CHLORIDE: 200 INJECTION INTRAVENOUS at 09:29

## 2017-10-27 NOTE — PROGRESS NOTES
Patient here with her daughter for infusion. No complaints today. Vitals obtained. Iv started. Vitamin C hung per [facility] as ordered. Infusing. No complaints. Sleeping on and off. Completed. Tolerated well. IV discontinued intact, dressing to site. Has a return visit scheduled. Discharged ambulatory with her daughter.

## 2017-11-03 ENCOUNTER — HOSPITAL ENCOUNTER (OUTPATIENT)
Dept: INFUSION THERAPY | Facility: MEDICAL CENTER | Age: 82
Discharge: HOME | End: 2017-11-03
Payer: MEDICARE

## 2017-11-03 VITALS
HEART RATE: 61 BPM | DIASTOLIC BLOOD PRESSURE: 66 MMHG | RESPIRATION RATE: 20 BRPM | TEMPERATURE: 98 F | SYSTOLIC BLOOD PRESSURE: 148 MMHG

## 2017-11-03 DIAGNOSIS — C50.411 MALIGNANT NEOPLASM OF UPPER-OUTER QUADRANT OF RIGHT FEMALE BREAST, UNSPECIFIED ESTROGEN RECEPTOR STATUS (HCC): ICD-10-CM

## 2017-11-03 DIAGNOSIS — D89.9 DISORDER INVOLVING IMMUNE MECHANISM (HCC): ICD-10-CM

## 2017-11-03 PROCEDURE — 96365 THER/PROPH/DIAG IV INF INIT: CPT

## 2017-11-03 PROCEDURE — 96366 THER/PROPH/DIAG IV INF ADDON: CPT

## 2017-11-03 PROCEDURE — 2500000003 HC RX 250 WO HCPCS: Performed by: INTERNAL MEDICINE

## 2017-11-03 RX ORDER — SODIUM CHLORIDE 0.9 % (FLUSH) 0.9 %
10 SYRINGE (ML) INJECTION PRN
Status: CANCELLED | OUTPATIENT
Start: 2017-11-03

## 2017-11-03 RX ORDER — SODIUM CHLORIDE 9 MG/ML
INJECTION, SOLUTION INTRAVENOUS ONCE
Status: DISCONTINUED | OUTPATIENT
Start: 2017-11-03 | End: 2017-11-04 | Stop reason: HOSPADM

## 2017-11-03 RX ORDER — SODIUM CHLORIDE 9 MG/ML
INJECTION, SOLUTION INTRAVENOUS ONCE
Status: CANCELLED | OUTPATIENT
Start: 2017-11-03

## 2017-11-03 RX ADMIN — MAGNESIUM CHLORIDE: 200 INJECTION INTRAVENOUS at 08:50

## 2017-11-03 NOTE — PROGRESS NOTES
Pt here with her daughter for ascorbic acid here per ambulatory. Pt states no new medications. Obtained iv without problems. Checked medication with 2 RN. Completed Ascorbic Acid without difficulty.

## 2017-11-08 ENCOUNTER — HOSPITAL ENCOUNTER (OUTPATIENT)
Dept: INFUSION THERAPY | Facility: MEDICAL CENTER | Age: 82
Discharge: HOME | End: 2017-11-08
Payer: MEDICARE

## 2017-11-08 VITALS
TEMPERATURE: 97.5 F | HEART RATE: 62 BPM | SYSTOLIC BLOOD PRESSURE: 125 MMHG | DIASTOLIC BLOOD PRESSURE: 70 MMHG | RESPIRATION RATE: 18 BRPM

## 2017-11-08 DIAGNOSIS — C50.411 MALIGNANT NEOPLASM OF UPPER-OUTER QUADRANT OF RIGHT FEMALE BREAST, UNSPECIFIED ESTROGEN RECEPTOR STATUS (HCC): ICD-10-CM

## 2017-11-08 DIAGNOSIS — D89.9 DISORDER INVOLVING IMMUNE MECHANISM (HCC): ICD-10-CM

## 2017-11-08 PROCEDURE — 96366 THER/PROPH/DIAG IV INF ADDON: CPT

## 2017-11-08 PROCEDURE — 2500000003 HC RX 250 WO HCPCS: Performed by: INTERNAL MEDICINE

## 2017-11-08 PROCEDURE — 96365 THER/PROPH/DIAG IV INF INIT: CPT

## 2017-11-08 PROCEDURE — 2580000003 HC RX 258: Performed by: INTERNAL MEDICINE

## 2017-11-08 RX ORDER — SODIUM CHLORIDE 9 MG/ML
INJECTION, SOLUTION INTRAVENOUS ONCE
Status: COMPLETED | OUTPATIENT
Start: 2017-11-08 | End: 2017-11-08

## 2017-11-08 RX ORDER — SODIUM CHLORIDE 0.9 % (FLUSH) 0.9 %
10 SYRINGE (ML) INJECTION PRN
Status: CANCELLED | OUTPATIENT
Start: 2017-11-08

## 2017-11-08 RX ORDER — SODIUM CHLORIDE 9 MG/ML
INJECTION, SOLUTION INTRAVENOUS ONCE
Status: CANCELLED | OUTPATIENT
Start: 2017-11-08

## 2017-11-08 RX ADMIN — MAGNESIUM CHLORIDE: 200 INJECTION INTRAVENOUS at 09:47

## 2017-11-08 RX ADMIN — SODIUM CHLORIDE: 9 INJECTION, SOLUTION INTRAVENOUS at 09:20

## 2017-11-08 NOTE — PROGRESS NOTES
Patent here for infusion. Feels well today. Vitals obtained. IV started. Vitamin C hung per MAR,  Infusing. No complaints. Completed. Tolerated well. IV discontinued intact, dressing to site. Has a return visit scheduled. Discharged ambulatory with family.

## 2017-11-08 NOTE — FLOWSHEET NOTE
Patient and family together.  offered support and presence. Chaplains will remain available to offer spiritual and emotional support as needed.      11/08/17 1442   Encounter Summary   Services provided to: Patient and family together   Referral/Consult From: [facility]   Continue Visiting (11/8/17)   Complexity of Encounter Low   Length of Encounter 15 minutes   Routine   Type Follow up   Assessment Approachable   Intervention Active listening;Explored feelings, thoughts, concerns;Explored coping resources;Nurtured hope;Sustaining presence/ Ministry of presence;Provided reading materials/devotional materials   Outcome Engaged in conversation;Expressed feelings/needs/concerns;Coping

## 2017-11-13 RX ORDER — SODIUM CHLORIDE 9 MG/ML
INJECTION, SOLUTION INTRAVENOUS ONCE
Status: CANCELLED | OUTPATIENT
Start: 2017-11-13

## 2017-11-17 ENCOUNTER — HOSPITAL ENCOUNTER (OUTPATIENT)
Dept: INFUSION THERAPY | Facility: MEDICAL CENTER | Age: 82
Discharge: HOME | End: 2017-11-17
Payer: MEDICARE

## 2017-11-17 VITALS
SYSTOLIC BLOOD PRESSURE: 132 MMHG | DIASTOLIC BLOOD PRESSURE: 68 MMHG | HEART RATE: 59 BPM | RESPIRATION RATE: 18 BRPM | TEMPERATURE: 98.7 F

## 2017-11-17 DIAGNOSIS — D89.9 DISORDER INVOLVING IMMUNE MECHANISM (HCC): ICD-10-CM

## 2017-11-17 DIAGNOSIS — C50.411 MALIGNANT NEOPLASM OF UPPER-OUTER QUADRANT OF RIGHT FEMALE BREAST, UNSPECIFIED ESTROGEN RECEPTOR STATUS (HCC): ICD-10-CM

## 2017-11-17 PROCEDURE — 2500000003 HC RX 250 WO HCPCS: Performed by: INTERNAL MEDICINE

## 2017-11-17 PROCEDURE — 6360000002 HC RX W HCPCS: Performed by: INTERNAL MEDICINE

## 2017-11-17 PROCEDURE — 96366 THER/PROPH/DIAG IV INF ADDON: CPT

## 2017-11-17 PROCEDURE — 96365 THER/PROPH/DIAG IV INF INIT: CPT

## 2017-11-17 RX ORDER — SODIUM CHLORIDE 9 MG/ML
INJECTION, SOLUTION INTRAVENOUS ONCE
Status: CANCELLED | OUTPATIENT
Start: 2017-11-17

## 2017-11-17 RX ORDER — SODIUM CHLORIDE 0.9 % (FLUSH) 0.9 %
10 SYRINGE (ML) INJECTION PRN
Status: CANCELLED | OUTPATIENT
Start: 2017-11-17

## 2017-11-17 RX ADMIN — MAGNESIUM CHLORIDE: 200 INJECTION INTRAVENOUS at 09:10

## 2017-11-17 NOTE — PROGRESS NOTES
Siddhartha Delgados here with her daughter. Started iv without difficulty, excellent blood return. Started treatment at 350 ml/hr. Infused without difficulty. Removed iv, no problems at site. Pt ate lunch before leaving.

## 2017-11-21 ENCOUNTER — HOSPITAL ENCOUNTER (OUTPATIENT)
Dept: INFUSION THERAPY | Facility: MEDICAL CENTER | Age: 82
Discharge: HOME | End: 2017-11-21
Payer: MEDICARE

## 2017-11-21 VITALS
SYSTOLIC BLOOD PRESSURE: 125 MMHG | RESPIRATION RATE: 18 BRPM | TEMPERATURE: 98.1 F | HEART RATE: 37 BPM | DIASTOLIC BLOOD PRESSURE: 62 MMHG

## 2017-11-21 DIAGNOSIS — C50.411 MALIGNANT NEOPLASM OF UPPER-OUTER QUADRANT OF RIGHT FEMALE BREAST, UNSPECIFIED ESTROGEN RECEPTOR STATUS (HCC): ICD-10-CM

## 2017-11-21 DIAGNOSIS — D89.9 DISORDER INVOLVING IMMUNE MECHANISM (HCC): ICD-10-CM

## 2017-11-21 PROCEDURE — 96366 THER/PROPH/DIAG IV INF ADDON: CPT

## 2017-11-21 PROCEDURE — 2500000003 HC RX 250 WO HCPCS: Performed by: INTERNAL MEDICINE

## 2017-11-21 PROCEDURE — 6360000002 HC RX W HCPCS: Performed by: INTERNAL MEDICINE

## 2017-11-21 PROCEDURE — 96365 THER/PROPH/DIAG IV INF INIT: CPT

## 2017-11-21 RX ORDER — SODIUM CHLORIDE 9 MG/ML
INJECTION, SOLUTION INTRAVENOUS ONCE
Status: DISCONTINUED | OUTPATIENT
Start: 2017-11-21 | End: 2017-11-22 | Stop reason: HOSPADM

## 2017-11-21 RX ORDER — SODIUM CHLORIDE 9 MG/ML
INJECTION, SOLUTION INTRAVENOUS ONCE
Status: CANCELLED | OUTPATIENT
Start: 2017-11-21

## 2017-11-21 RX ORDER — SODIUM CHLORIDE 0.9 % (FLUSH) 0.9 %
10 SYRINGE (ML) INJECTION PRN
Status: CANCELLED | OUTPATIENT
Start: 2017-11-21

## 2017-11-21 RX ADMIN — MAGNESIUM CHLORIDE: 200 INJECTION INTRAVENOUS at 13:53

## 2017-11-21 NOTE — PROGRESS NOTES
Sven Hernandez here for Ascorbic Acid 70 grams magnesium Chloride. Obtained iv without difficulty, excellent blood return. 1000 ml of ascorbic acid 70 grams with magnesium chloride 100 ml  At 350 ml/hr. Infusing without difficulty. Pt completed without difficulty. Removed iv without difficulty.

## 2017-11-30 ENCOUNTER — HOSPITAL ENCOUNTER (OUTPATIENT)
Dept: INFUSION THERAPY | Facility: MEDICAL CENTER | Age: 82
Discharge: HOME | End: 2017-11-30
Payer: MEDICARE

## 2017-11-30 VITALS
TEMPERATURE: 98 F | RESPIRATION RATE: 20 BRPM | DIASTOLIC BLOOD PRESSURE: 42 MMHG | SYSTOLIC BLOOD PRESSURE: 136 MMHG | HEART RATE: 54 BPM

## 2017-11-30 DIAGNOSIS — C50.411 MALIGNANT NEOPLASM OF UPPER-OUTER QUADRANT OF RIGHT FEMALE BREAST, UNSPECIFIED ESTROGEN RECEPTOR STATUS (HCC): ICD-10-CM

## 2017-11-30 DIAGNOSIS — D89.9 DISORDER INVOLVING IMMUNE MECHANISM (HCC): ICD-10-CM

## 2017-11-30 PROCEDURE — 2580000003 HC RX 258: Performed by: INTERNAL MEDICINE

## 2017-11-30 PROCEDURE — 6360000002 HC RX W HCPCS: Performed by: INTERNAL MEDICINE

## 2017-11-30 PROCEDURE — 96360 HYDRATION IV INFUSION INIT: CPT

## 2017-11-30 PROCEDURE — 96361 HYDRATE IV INFUSION ADD-ON: CPT

## 2017-11-30 PROCEDURE — 2500000003 HC RX 250 WO HCPCS: Performed by: INTERNAL MEDICINE

## 2017-11-30 RX ORDER — SODIUM CHLORIDE 0.9 % (FLUSH) 0.9 %
10 SYRINGE (ML) INJECTION PRN
Status: CANCELLED | OUTPATIENT
Start: 2017-11-30

## 2017-11-30 RX ORDER — SODIUM CHLORIDE 9 MG/ML
INJECTION, SOLUTION INTRAVENOUS ONCE
Status: CANCELLED | OUTPATIENT
Start: 2017-11-30

## 2017-11-30 RX ORDER — SODIUM CHLORIDE 9 MG/ML
INJECTION, SOLUTION INTRAVENOUS ONCE
Status: COMPLETED | OUTPATIENT
Start: 2017-11-30 | End: 2017-11-30

## 2017-11-30 RX ADMIN — SODIUM CHLORIDE: 9 INJECTION, SOLUTION INTRAVENOUS at 09:50

## 2017-11-30 RX ADMIN — MAGNESIUM CHLORIDE: 200 INJECTION INTRAVENOUS at 09:50

## 2017-11-30 NOTE — PROGRESS NOTES
Patient arrives to the clinic for infusion today. No complaints, has fatigue. IV started into the right forearm with # 22 protective. Good blood return obtained. Vit C infusion, infused over 2 hrs. Patient tolerated. No signs or symptoms of infiltration at the IV site. IV discontinued and patient leaves in stable condition.

## 2017-11-30 NOTE — FLOWSHEET NOTE
encountered patient and family. Patient shared that she had a good holiday and a new great grandchild.  offered support and presence. Chaplains will remain available to offer spiritual and emotional support as needed.      11/30/17 1424   Encounter Summary   Services provided to: Patient and family together   Referral/Consult From: [facility]   Continue Visiting (11/30/17)   Complexity of Encounter Low   Length of Encounter 15 minutes   Routine   Type Follow up   Assessment Approachable   Intervention Active listening;Explored feelings, thoughts, concerns;Explored coping resources;Nurtured hope;Sustaining presence/ Ministry of presence   Outcome Engaged in conversation;Expressed feelings/needs/concerns;Coping

## 2017-12-08 ENCOUNTER — HOSPITAL ENCOUNTER (OUTPATIENT)
Dept: INFUSION THERAPY | Facility: MEDICAL CENTER | Age: 82
Discharge: HOME | End: 2017-12-08
Payer: MEDICARE

## 2017-12-08 VITALS
HEART RATE: 66 BPM | DIASTOLIC BLOOD PRESSURE: 68 MMHG | SYSTOLIC BLOOD PRESSURE: 144 MMHG | RESPIRATION RATE: 20 BRPM | TEMPERATURE: 97.4 F

## 2017-12-08 DIAGNOSIS — D89.9 DISORDER INVOLVING IMMUNE MECHANISM (HCC): ICD-10-CM

## 2017-12-08 DIAGNOSIS — C50.411 MALIGNANT NEOPLASM OF UPPER-OUTER QUADRANT OF RIGHT FEMALE BREAST, UNSPECIFIED ESTROGEN RECEPTOR STATUS (HCC): ICD-10-CM

## 2017-12-08 PROCEDURE — 96366 THER/PROPH/DIAG IV INF ADDON: CPT

## 2017-12-08 PROCEDURE — 6360000002 HC RX W HCPCS: Performed by: INTERNAL MEDICINE

## 2017-12-08 PROCEDURE — 96365 THER/PROPH/DIAG IV INF INIT: CPT

## 2017-12-08 PROCEDURE — 2500000003 HC RX 250 WO HCPCS: Performed by: INTERNAL MEDICINE

## 2017-12-08 RX ORDER — SODIUM CHLORIDE 9 MG/ML
INJECTION, SOLUTION INTRAVENOUS ONCE
Status: CANCELLED | OUTPATIENT
Start: 2017-12-08

## 2017-12-08 RX ORDER — PREDNISONE 20 MG/1
20 TABLET ORAL DAILY
Status: ON HOLD | COMMUNITY
End: 2018-02-23 | Stop reason: HOSPADM

## 2017-12-08 RX ORDER — SODIUM CHLORIDE 9 MG/ML
INJECTION, SOLUTION INTRAVENOUS ONCE
Status: DISCONTINUED | OUTPATIENT
Start: 2017-12-08 | End: 2017-12-09 | Stop reason: HOSPADM

## 2017-12-08 RX ORDER — SODIUM CHLORIDE 0.9 % (FLUSH) 0.9 %
10 SYRINGE (ML) INJECTION PRN
Status: CANCELLED | OUTPATIENT
Start: 2017-12-08

## 2017-12-08 RX ADMIN — MAGNESIUM CHLORIDE: 200 INJECTION INTRAVENOUS at 09:30

## 2017-12-08 NOTE — PROGRESS NOTES
Mehreen Perera here per ambulatory for treatment. Pt asking about prednisone, new script just ordered and will it interfere with her tx.today? Stated no, it should not interfere. Obtained iv, excellent blood return. Check vitamin c, with magnesium with second rn  Running at 350 ml/hr. Pt completed without problems. Removed IV without complications.

## 2017-12-15 ENCOUNTER — HOSPITAL ENCOUNTER (OUTPATIENT)
Dept: INFUSION THERAPY | Facility: MEDICAL CENTER | Age: 82
Discharge: HOME | End: 2017-12-15
Payer: MEDICARE

## 2017-12-15 VITALS
RESPIRATION RATE: 20 BRPM | DIASTOLIC BLOOD PRESSURE: 72 MMHG | HEART RATE: 62 BPM | SYSTOLIC BLOOD PRESSURE: 132 MMHG | TEMPERATURE: 97.5 F

## 2017-12-15 DIAGNOSIS — D89.9 DISORDER INVOLVING IMMUNE MECHANISM (HCC): ICD-10-CM

## 2017-12-15 DIAGNOSIS — C50.411 MALIGNANT NEOPLASM OF UPPER-OUTER QUADRANT OF RIGHT FEMALE BREAST, UNSPECIFIED ESTROGEN RECEPTOR STATUS (HCC): ICD-10-CM

## 2017-12-15 PROCEDURE — 6360000002 HC RX W HCPCS: Performed by: INTERNAL MEDICINE

## 2017-12-15 PROCEDURE — 2500000003 HC RX 250 WO HCPCS: Performed by: INTERNAL MEDICINE

## 2017-12-15 PROCEDURE — 96365 THER/PROPH/DIAG IV INF INIT: CPT

## 2017-12-15 PROCEDURE — 96366 THER/PROPH/DIAG IV INF ADDON: CPT

## 2017-12-15 RX ORDER — SODIUM CHLORIDE 0.9 % (FLUSH) 0.9 %
10 SYRINGE (ML) INJECTION PRN
Status: CANCELLED | OUTPATIENT
Start: 2017-12-15

## 2017-12-15 RX ORDER — SODIUM CHLORIDE 9 MG/ML
INJECTION, SOLUTION INTRAVENOUS ONCE
Status: CANCELLED | OUTPATIENT
Start: 2017-12-15

## 2017-12-15 RX ORDER — SODIUM CHLORIDE 9 MG/ML
INJECTION, SOLUTION INTRAVENOUS ONCE
Status: DISCONTINUED | OUTPATIENT
Start: 2017-12-15 | End: 2017-12-16 | Stop reason: HOSPADM

## 2017-12-15 RX ADMIN — MAGNESIUM CHLORIDE: 200 INJECTION INTRAVENOUS at 09:34

## 2017-12-15 NOTE — PROGRESS NOTES
[facility] here per ambulatory with her daughter for treatment. No complaints, no new problems. Obtained iv without difficulty, excellent blood return. Checked vitamin c with magnesium with 2 rn before hanging. Infused at 350 ml/hr without difficulty. Barb ate lunch, completed treatment. Removed iv, no problems at site. Pt back next week.

## 2017-12-19 ENCOUNTER — HOSPITAL ENCOUNTER (OUTPATIENT)
Dept: INFUSION THERAPY | Facility: MEDICAL CENTER | Age: 82
Discharge: HOME | End: 2017-12-19
Payer: MEDICARE

## 2017-12-19 VITALS
DIASTOLIC BLOOD PRESSURE: 71 MMHG | TEMPERATURE: 98.5 F | HEART RATE: 74 BPM | SYSTOLIC BLOOD PRESSURE: 134 MMHG | RESPIRATION RATE: 18 BRPM

## 2017-12-19 DIAGNOSIS — D89.9 DISORDER INVOLVING IMMUNE MECHANISM (HCC): ICD-10-CM

## 2017-12-19 DIAGNOSIS — C50.411 MALIGNANT NEOPLASM OF UPPER-OUTER QUADRANT OF RIGHT FEMALE BREAST, UNSPECIFIED ESTROGEN RECEPTOR STATUS (HCC): ICD-10-CM

## 2017-12-19 PROCEDURE — 96360 HYDRATION IV INFUSION INIT: CPT

## 2017-12-19 PROCEDURE — 2500000003 HC RX 250 WO HCPCS: Performed by: INTERNAL MEDICINE

## 2017-12-19 PROCEDURE — 96361 HYDRATE IV INFUSION ADD-ON: CPT

## 2017-12-19 PROCEDURE — 2580000003 HC RX 258: Performed by: INTERNAL MEDICINE

## 2017-12-19 PROCEDURE — 6360000002 HC RX W HCPCS: Performed by: INTERNAL MEDICINE

## 2017-12-19 RX ORDER — SODIUM CHLORIDE 0.9 % (FLUSH) 0.9 %
10 SYRINGE (ML) INJECTION PRN
Status: CANCELLED | OUTPATIENT
Start: 2017-12-19

## 2017-12-19 RX ORDER — SODIUM CHLORIDE 9 MG/ML
INJECTION, SOLUTION INTRAVENOUS ONCE
Status: COMPLETED | OUTPATIENT
Start: 2017-12-19 | End: 2017-12-19

## 2017-12-19 RX ORDER — SODIUM CHLORIDE 9 MG/ML
INJECTION, SOLUTION INTRAVENOUS ONCE
Status: CANCELLED | OUTPATIENT
Start: 2017-12-19

## 2017-12-19 RX ADMIN — SODIUM CHLORIDE: 9 INJECTION, SOLUTION INTRAVENOUS at 08:38

## 2017-12-19 RX ADMIN — MAGNESIUM CHLORIDE: 200 INJECTION INTRAVENOUS at 08:35

## 2017-12-19 NOTE — PROGRESS NOTES
Pt. Arrives for hydration vitamin C/Magnesium. Pt. C/o swelling in joints, hand swelling noted. Pt. Taking steroids.   Hydration started  Hydration completed without difficutly

## 2018-01-11 ENCOUNTER — HOSPITAL ENCOUNTER (OUTPATIENT)
Dept: INFUSION THERAPY | Facility: MEDICAL CENTER | Age: 83
Discharge: HOME | End: 2018-01-11
Payer: MEDICARE

## 2018-01-11 ENCOUNTER — TELEPHONE (OUTPATIENT)
Dept: INFUSION THERAPY | Facility: MEDICAL CENTER | Age: 83
End: 2018-01-11

## 2018-01-16 ENCOUNTER — HOSPITAL ENCOUNTER (OUTPATIENT)
Facility: MEDICAL CENTER | Age: 83
End: 2018-01-16
Payer: MEDICARE

## 2018-01-18 ENCOUNTER — HOSPITAL ENCOUNTER (OUTPATIENT)
Dept: INFUSION THERAPY | Facility: MEDICAL CENTER | Age: 83
Discharge: HOME | End: 2018-01-18
Payer: MEDICARE

## 2018-01-18 VITALS
DIASTOLIC BLOOD PRESSURE: 77 MMHG | HEART RATE: 74 BPM | RESPIRATION RATE: 18 BRPM | SYSTOLIC BLOOD PRESSURE: 128 MMHG | TEMPERATURE: 98.4 F

## 2018-01-18 DIAGNOSIS — C50.411 MALIGNANT NEOPLASM OF UPPER-OUTER QUADRANT OF RIGHT FEMALE BREAST, UNSPECIFIED ESTROGEN RECEPTOR STATUS (HCC): ICD-10-CM

## 2018-01-18 DIAGNOSIS — D89.9 DISORDER INVOLVING IMMUNE MECHANISM (HCC): ICD-10-CM

## 2018-01-18 PROCEDURE — 6360000002 HC RX W HCPCS: Performed by: INTERNAL MEDICINE

## 2018-01-18 PROCEDURE — 2500000003 HC RX 250 WO HCPCS: Performed by: INTERNAL MEDICINE

## 2018-01-18 PROCEDURE — 96365 THER/PROPH/DIAG IV INF INIT: CPT

## 2018-01-18 PROCEDURE — 96366 THER/PROPH/DIAG IV INF ADDON: CPT

## 2018-01-18 RX ORDER — SODIUM CHLORIDE 9 MG/ML
INJECTION, SOLUTION INTRAVENOUS ONCE
Status: DISCONTINUED | OUTPATIENT
Start: 2018-01-18 | End: 2018-01-19 | Stop reason: HOSPADM

## 2018-01-18 RX ORDER — SODIUM CHLORIDE 0.9 % (FLUSH) 0.9 %
10 SYRINGE (ML) INJECTION PRN
Status: CANCELLED | OUTPATIENT
Start: 2018-01-18

## 2018-01-18 RX ORDER — SODIUM CHLORIDE 9 MG/ML
INJECTION, SOLUTION INTRAVENOUS ONCE
Status: CANCELLED | OUTPATIENT
Start: 2018-01-18

## 2018-01-18 RX ADMIN — MAGNESIUM CHLORIDE: 200 INJECTION INTRAVENOUS at 09:48

## 2018-01-18 NOTE — PROGRESS NOTES
Pt here per ambulatory for treatment. Pt denies problems with last infusion. Obtained iv without problems excellent blood return. Checked treatment with 2 nd, Rn prior to hanging. Ran infusion over 3 1/2 hours at 350 ml/hr. Flushed line, removed iv without difficulty. Pt back in one week for next treatment.

## 2018-01-25 ENCOUNTER — HOSPITAL ENCOUNTER (OUTPATIENT)
Dept: INFUSION THERAPY | Facility: MEDICAL CENTER | Age: 83
Discharge: HOME | End: 2018-01-25
Payer: MEDICARE

## 2018-01-25 ENCOUNTER — TELEPHONE (OUTPATIENT)
Dept: INFUSION THERAPY | Facility: MEDICAL CENTER | Age: 83
End: 2018-01-25

## 2018-01-25 NOTE — TELEPHONE ENCOUNTER
I RECEIVED A CALL STATING THAT KAILYN IS NOT FEELING WELL TODAY AND WILL NOT BE COMING TO HER APPT TODAY.

## 2018-02-15 ENCOUNTER — TELEPHONE (OUTPATIENT)
Dept: ONCOLOGY | Age: 83
End: 2018-02-15

## 2018-02-15 ENCOUNTER — HOSPITAL ENCOUNTER (OUTPATIENT)
Dept: INFUSION THERAPY | Facility: MEDICAL CENTER | Age: 83
Discharge: HOME | End: 2018-02-15
Payer: MEDICARE

## 2018-02-15 VITALS
TEMPERATURE: 97.7 F | DIASTOLIC BLOOD PRESSURE: 70 MMHG | HEART RATE: 65 BPM | SYSTOLIC BLOOD PRESSURE: 129 MMHG | RESPIRATION RATE: 20 BRPM

## 2018-02-15 DIAGNOSIS — D89.9 DISORDER INVOLVING IMMUNE MECHANISM (HCC): ICD-10-CM

## 2018-02-15 DIAGNOSIS — C50.411 MALIGNANT NEOPLASM OF UPPER-OUTER QUADRANT OF RIGHT FEMALE BREAST, UNSPECIFIED ESTROGEN RECEPTOR STATUS (HCC): ICD-10-CM

## 2018-02-15 PROCEDURE — 2580000003 HC RX 258: Performed by: INTERNAL MEDICINE

## 2018-02-15 PROCEDURE — 96360 HYDRATION IV INFUSION INIT: CPT

## 2018-02-15 PROCEDURE — 6360000002 HC RX W HCPCS: Performed by: INTERNAL MEDICINE

## 2018-02-15 PROCEDURE — 2500000003 HC RX 250 WO HCPCS: Performed by: INTERNAL MEDICINE

## 2018-02-15 PROCEDURE — 96361 HYDRATE IV INFUSION ADD-ON: CPT

## 2018-02-15 RX ORDER — SODIUM CHLORIDE 9 MG/ML
INJECTION, SOLUTION INTRAVENOUS ONCE
Status: COMPLETED | OUTPATIENT
Start: 2018-02-15 | End: 2018-02-15

## 2018-02-15 RX ORDER — SODIUM CHLORIDE 9 MG/ML
INJECTION, SOLUTION INTRAVENOUS ONCE
Status: CANCELLED | OUTPATIENT
Start: 2018-02-15

## 2018-02-15 RX ORDER — SODIUM CHLORIDE 0.9 % (FLUSH) 0.9 %
10 SYRINGE (ML) INJECTION PRN
Status: CANCELLED | OUTPATIENT
Start: 2018-02-15

## 2018-02-15 RX ADMIN — SODIUM CHLORIDE 500 ML: 9 INJECTION, SOLUTION INTRAVENOUS at 14:10

## 2018-02-15 RX ADMIN — MAGNESIUM CHLORIDE: 200 INJECTION INTRAVENOUS at 14:28

## 2018-02-15 NOTE — FLOWSHEET NOTE
Patient and family together. Patient shared that she is concerned about son working too hard. Patient also shared that she was in [place] for three weeks but was sick most of that time with the flu. Patient reports that she has had a difficult time \"bouncing back\" from the flu and has lost some weight and now is being asked to gain weight. Patient complained of fatigue and not having the stamina that she had prior to the flu.  was a listening presence. Patient smiled during the conversation and thanked  for the visit. Chaplains will remain available to offer support to patient/family as needed. 02/15/18 1629   Encounter Summary   Services provided to: Patient and family together   Referral/Consult From: [facility] Visiting (02/15/18)   Complexity of Encounter Moderate   Length of Encounter 30 minutes   Routine   Type Follow up   Assessment Approachable   Intervention Active listening;Explored feelings, thoughts, concerns;Explored coping resources;Nurtured hope;Discussed illness/injury and it's impact; Discussed meaning/purpose   Outcome Engaged in conversation;Expressed feelings/needs/concerns;Coping   Spiritual/Protestant   Type Spiritual support               Scheduled appointments:  Future Appointments  Date Time Provider Gwen Fountain   2/22/2018 1:30 PM [facility] 04 [facility] None

## 2018-02-19 ENCOUNTER — APPOINTMENT (OUTPATIENT)
Dept: GENERAL RADIOLOGY | Age: 83
DRG: 155 | End: 2018-02-19
Payer: MEDICARE

## 2018-02-19 ENCOUNTER — HOSPITAL ENCOUNTER (INPATIENT)
Age: 83
LOS: 3 days | Discharge: HOME HEALTH | DRG: 155 | End: 2018-02-23
Attending: SPECIALIST | Admitting: INTERNAL MEDICINE
Payer: MEDICARE

## 2018-02-19 DIAGNOSIS — K11.20 SIALADENITIS: Primary | ICD-10-CM

## 2018-02-19 DIAGNOSIS — R22.1 NECK SWELLING: ICD-10-CM

## 2018-02-19 DIAGNOSIS — E87.1 HYPONATREMIA: ICD-10-CM

## 2018-02-19 LAB
ABSOLUTE EOS #: 0 K/UL (ref 0–0.4)
ABSOLUTE IMMATURE GRANULOCYTE: ABNORMAL K/UL (ref 0–0.3)
ABSOLUTE LYMPH #: 0.6 K/UL (ref 1–4.8)
ABSOLUTE MONO #: 0.3 K/UL (ref 0.1–1.2)
ANION GAP SERPL CALCULATED.3IONS-SCNC: 15 MMOL/L (ref 9–17)
BASOPHILS # BLD: 1 % (ref 0–2)
BASOPHILS ABSOLUTE: 0 K/UL (ref 0–0.2)
BUN BLDV-MCNC: 25 MG/DL (ref 8–23)
BUN/CREAT BLD: ABNORMAL (ref 9–20)
CALCIUM SERPL-MCNC: 9.3 MG/DL (ref 8.6–10.4)
CHLORIDE BLD-SCNC: 91 MMOL/L (ref 98–107)
CO2: 23 MMOL/L (ref 20–31)
CREAT SERPL-MCNC: 0.63 MG/DL (ref 0.5–0.9)
DIFFERENTIAL TYPE: ABNORMAL
EKG ATRIAL RATE: 61 BPM
EKG P AXIS: 92 DEGREES
EKG P-R INTERVAL: 146 MS
EKG Q-T INTERVAL: 418 MS
EKG QRS DURATION: 90 MS
EKG QTC CALCULATION (BAZETT): 420 MS
EKG R AXIS: 0 DEGREES
EKG T AXIS: 56 DEGREES
EKG VENTRICULAR RATE: 61 BPM
EOSINOPHILS RELATIVE PERCENT: 0 % (ref 1–4)
GFR AFRICAN AMERICAN: >60 ML/MIN
GFR NON-AFRICAN AMERICAN: >60 ML/MIN
GFR SERPL CREATININE-BSD FRML MDRD: ABNORMAL ML/MIN/{1.73_M2}
GFR SERPL CREATININE-BSD FRML MDRD: ABNORMAL ML/MIN/{1.73_M2}
GLUCOSE BLD-MCNC: 117 MG/DL (ref 70–99)
HCT VFR BLD CALC: 36.2 % (ref 36–46)
HEMOGLOBIN: 12.2 G/DL (ref 12–16)
IMMATURE GRANULOCYTES: ABNORMAL %
LYMPHOCYTES # BLD: 10 % (ref 24–44)
MAGNESIUM: 1.9 MG/DL (ref 1.6–2.6)
MCH RBC QN AUTO: 32.3 PG (ref 26–34)
MCHC RBC AUTO-ENTMCNC: 33.7 G/DL (ref 31–37)
MCV RBC AUTO: 96 FL (ref 80–100)
MONOCYTES # BLD: 5 % (ref 2–11)
MYOGLOBIN: 105 NG/ML (ref 25–58)
NRBC AUTOMATED: ABNORMAL PER 100 WBC
PDW BLD-RTO: 15.4 % (ref 12.5–15.4)
PLATELET # BLD: 342 K/UL (ref 140–450)
PLATELET ESTIMATE: ABNORMAL
PMV BLD AUTO: 6.7 FL (ref 6–12)
POTASSIUM SERPL-SCNC: 4.7 MMOL/L (ref 3.7–5.3)
RBC # BLD: 3.77 M/UL (ref 4–5.2)
RBC # BLD: ABNORMAL 10*6/UL
SEG NEUTROPHILS: 84 % (ref 36–66)
SEGMENTED NEUTROPHILS ABSOLUTE COUNT: 4.9 K/UL (ref 1.8–7.7)
SODIUM BLD-SCNC: 129 MMOL/L (ref 135–144)
TROPONIN INTERP: ABNORMAL
TROPONIN T: <0.03 NG/ML
WBC # BLD: 5.8 K/UL (ref 3.5–11)
WBC # BLD: ABNORMAL 10*3/UL

## 2018-02-19 PROCEDURE — 96375 TX/PRO/DX INJ NEW DRUG ADDON: CPT

## 2018-02-19 PROCEDURE — 99285 EMERGENCY DEPT VISIT HI MDM: CPT

## 2018-02-19 PROCEDURE — 83735 ASSAY OF MAGNESIUM: CPT

## 2018-02-19 PROCEDURE — 83874 ASSAY OF MYOGLOBIN: CPT

## 2018-02-19 PROCEDURE — 96365 THER/PROPH/DIAG IV INF INIT: CPT

## 2018-02-19 PROCEDURE — 2580000003 HC RX 258: Performed by: SPECIALIST

## 2018-02-19 PROCEDURE — 85025 COMPLETE CBC W/AUTO DIFF WBC: CPT

## 2018-02-19 PROCEDURE — 36415 COLL VENOUS BLD VENIPUNCTURE: CPT

## 2018-02-19 PROCEDURE — 71046 X-RAY EXAM CHEST 2 VIEWS: CPT

## 2018-02-19 PROCEDURE — 84484 ASSAY OF TROPONIN QUANT: CPT

## 2018-02-19 PROCEDURE — 96376 TX/PRO/DX INJ SAME DRUG ADON: CPT

## 2018-02-19 PROCEDURE — 6370000000 HC RX 637 (ALT 250 FOR IP): Performed by: SPECIALIST

## 2018-02-19 PROCEDURE — 80048 BASIC METABOLIC PNL TOTAL CA: CPT

## 2018-02-19 PROCEDURE — 6360000002 HC RX W HCPCS: Performed by: SPECIALIST

## 2018-02-19 PROCEDURE — 93005 ELECTROCARDIOGRAM TRACING: CPT

## 2018-02-19 RX ORDER — FENTANYL CITRATE 50 UG/ML
50 INJECTION, SOLUTION INTRAMUSCULAR; INTRAVENOUS ONCE
Status: COMPLETED | OUTPATIENT
Start: 2018-02-20 | End: 2018-02-19

## 2018-02-19 RX ORDER — FENTANYL CITRATE 50 UG/ML
25 INJECTION, SOLUTION INTRAMUSCULAR; INTRAVENOUS ONCE
Status: COMPLETED | OUTPATIENT
Start: 2018-02-19 | End: 2018-02-19

## 2018-02-19 RX ORDER — 0.9 % SODIUM CHLORIDE 0.9 %
500 INTRAVENOUS SOLUTION INTRAVENOUS ONCE
Status: COMPLETED | OUTPATIENT
Start: 2018-02-20 | End: 2018-02-20

## 2018-02-19 RX ORDER — ASPIRIN 81 MG/1
162 TABLET, CHEWABLE ORAL ONCE
Status: COMPLETED | OUTPATIENT
Start: 2018-02-19 | End: 2018-02-19

## 2018-02-19 RX ORDER — PROPRANOLOL HYDROCHLORIDE 10 MG/1
10 TABLET ORAL NIGHTLY
Status: ON HOLD | COMMUNITY
End: 2019-11-25 | Stop reason: HOSPADM

## 2018-02-19 RX ORDER — KETOROLAC TROMETHAMINE 15 MG/ML
15 INJECTION, SOLUTION INTRAMUSCULAR; INTRAVENOUS ONCE
Status: COMPLETED | OUTPATIENT
Start: 2018-02-19 | End: 2018-02-19

## 2018-02-19 RX ORDER — POTASSIUM CHLORIDE 1.5 G/1.77G
20 POWDER, FOR SOLUTION ORAL 2 TIMES DAILY
Status: ON HOLD | COMMUNITY
End: 2018-02-23 | Stop reason: HOSPADM

## 2018-02-19 RX ADMIN — KETOROLAC TROMETHAMINE 15 MG: 15 INJECTION, SOLUTION INTRAMUSCULAR; INTRAVENOUS at 23:35

## 2018-02-19 RX ADMIN — ASPIRIN 81 MG 162 MG: 81 TABLET ORAL at 22:54

## 2018-02-19 RX ADMIN — FENTANYL CITRATE 50 MCG: 50 INJECTION INTRAMUSCULAR; INTRAVENOUS at 23:59

## 2018-02-19 RX ADMIN — FENTANYL CITRATE 25 MCG: 50 INJECTION INTRAMUSCULAR; INTRAVENOUS at 22:54

## 2018-02-19 RX ADMIN — SODIUM CHLORIDE 500 ML: 9 INJECTION, SOLUTION INTRAVENOUS at 23:59

## 2018-02-19 ASSESSMENT — PAIN DESCRIPTION - LOCATION: LOCATION: JAW

## 2018-02-19 ASSESSMENT — PAIN SCALES - GENERAL
PAINLEVEL_OUTOF10: 10

## 2018-02-19 ASSESSMENT — PAIN DESCRIPTION - PAIN TYPE: TYPE: ACUTE PAIN

## 2018-02-19 NOTE — Clinical Note
Patient Class: Inpatient [101]   REQUIRED: Diagnosis: Hyponatremia [564529]   Estimated Length of Stay: Estimated stay of more than 2 midnights   Future Attending Provider: Sharri Williamson [[id number]]   Admitting Provider: Sharri Williamson [[id number]]   Preferred Department: progressive care

## 2018-02-20 ENCOUNTER — APPOINTMENT (OUTPATIENT)
Dept: CT IMAGING | Age: 83
DRG: 155 | End: 2018-02-20
Payer: MEDICARE

## 2018-02-20 ENCOUNTER — APPOINTMENT (OUTPATIENT)
Dept: GENERAL RADIOLOGY | Age: 83
DRG: 155 | End: 2018-02-20
Payer: MEDICARE

## 2018-02-20 PROBLEM — E87.1 HYPONATREMIA: Status: ACTIVE | Noted: 2018-02-20

## 2018-02-20 PROBLEM — K11.20 SUBMANDIBULAR SIALOADENITIS: Status: ACTIVE | Noted: 2018-02-20

## 2018-02-20 PROBLEM — C50.911 INFILTRATING DUCTAL CARCINOMA OF RIGHT BREAST (HCC): Status: ACTIVE | Noted: 2018-02-20

## 2018-02-20 PROBLEM — E06.3 AUTOIMMUNE THYROIDITIS: Status: ACTIVE | Noted: 2018-02-20

## 2018-02-20 LAB
ANION GAP SERPL CALCULATED.3IONS-SCNC: 16 MMOL/L (ref 9–17)
BUN BLDV-MCNC: 23 MG/DL (ref 8–23)
BUN/CREAT BLD: ABNORMAL (ref 9–20)
CALCIUM SERPL-MCNC: 8.4 MG/DL (ref 8.6–10.4)
CHLORIDE BLD-SCNC: 90 MMOL/L (ref 98–107)
CO2: 20 MMOL/L (ref 20–31)
CREAT SERPL-MCNC: 0.61 MG/DL (ref 0.5–0.9)
GFR AFRICAN AMERICAN: >60 ML/MIN
GFR NON-AFRICAN AMERICAN: >60 ML/MIN
GFR SERPL CREATININE-BSD FRML MDRD: ABNORMAL ML/MIN/{1.73_M2}
GFR SERPL CREATININE-BSD FRML MDRD: ABNORMAL ML/MIN/{1.73_M2}
GLUCOSE BLD-MCNC: 113 MG/DL (ref 70–99)
OSMOLALITY URINE: 916 MOSM/KG (ref 80–1300)
PH UA: 6 (ref 5–8)
POTASSIUM SERPL-SCNC: 5.1 MMOL/L (ref 3.7–5.3)
POTASSIUM, UR: 84.4 MMOL/L
SERUM OSMOLALITY: 278 MOSM/KG (ref 275–295)
SODIUM BLD-SCNC: 125 MMOL/L (ref 135–144)
SODIUM BLD-SCNC: 126 MMOL/L (ref 135–144)
SODIUM,UR: 47 MMOL/L
THYROXINE, FREE: 1.21 NG/DL (ref 0.93–1.7)
TOTAL PROTEIN, URINE: 95 MG/DL
TSH SERPL DL<=0.05 MIU/L-ACNC: 5.88 MIU/L (ref 0.3–5)

## 2018-02-20 PROCEDURE — 6370000000 HC RX 637 (ALT 250 FOR IP): Performed by: INTERNAL MEDICINE

## 2018-02-20 PROCEDURE — 84443 ASSAY THYROID STIM HORMONE: CPT

## 2018-02-20 PROCEDURE — 70110 X-RAY EXAM OF JAW 4/> VIEWS: CPT

## 2018-02-20 PROCEDURE — 6370000000 HC RX 637 (ALT 250 FOR IP): Performed by: OTOLARYNGOLOGY

## 2018-02-20 PROCEDURE — 84156 ASSAY OF PROTEIN URINE: CPT

## 2018-02-20 PROCEDURE — 80048 BASIC METABOLIC PNL TOTAL CA: CPT

## 2018-02-20 PROCEDURE — 86235 NUCLEAR ANTIGEN ANTIBODY: CPT

## 2018-02-20 PROCEDURE — 6370000000 HC RX 637 (ALT 250 FOR IP): Performed by: NURSE PRACTITIONER

## 2018-02-20 PROCEDURE — 84300 ASSAY OF URINE SODIUM: CPT

## 2018-02-20 PROCEDURE — 83986 ASSAY PH BODY FLUID NOS: CPT

## 2018-02-20 PROCEDURE — 6360000002 HC RX W HCPCS: Performed by: NURSE PRACTITIONER

## 2018-02-20 PROCEDURE — 83935 ASSAY OF URINE OSMOLALITY: CPT

## 2018-02-20 PROCEDURE — 83930 ASSAY OF BLOOD OSMOLALITY: CPT

## 2018-02-20 PROCEDURE — 6360000002 HC RX W HCPCS: Performed by: INTERNAL MEDICINE

## 2018-02-20 PROCEDURE — 99222 1ST HOSP IP/OBS MODERATE 55: CPT | Performed by: INTERNAL MEDICINE

## 2018-02-20 PROCEDURE — 84295 ASSAY OF SERUM SODIUM: CPT

## 2018-02-20 PROCEDURE — 2060000000 HC ICU INTERMEDIATE R&B

## 2018-02-20 PROCEDURE — 6360000002 HC RX W HCPCS: Performed by: SPECIALIST

## 2018-02-20 PROCEDURE — 6360000004 HC RX CONTRAST MEDICATION: Performed by: SPECIALIST

## 2018-02-20 PROCEDURE — 94762 N-INVAS EAR/PLS OXIMTRY CONT: CPT

## 2018-02-20 PROCEDURE — 2580000003 HC RX 258: Performed by: NURSE PRACTITIONER

## 2018-02-20 PROCEDURE — 70491 CT SOFT TISSUE NECK W/DYE: CPT

## 2018-02-20 PROCEDURE — 2580000003 HC RX 258: Performed by: SPECIALIST

## 2018-02-20 PROCEDURE — 84439 ASSAY OF FREE THYROXINE: CPT

## 2018-02-20 PROCEDURE — 36415 COLL VENOUS BLD VENIPUNCTURE: CPT

## 2018-02-20 PROCEDURE — 6370000000 HC RX 637 (ALT 250 FOR IP): Performed by: SPECIALIST

## 2018-02-20 PROCEDURE — 84133 ASSAY OF URINE POTASSIUM: CPT

## 2018-02-20 RX ORDER — ALPRAZOLAM 0.5 MG/1
0.5 TABLET ORAL NIGHTLY PRN
Status: DISCONTINUED | OUTPATIENT
Start: 2018-02-20 | End: 2018-02-22

## 2018-02-20 RX ORDER — ONDANSETRON 2 MG/ML
4 INJECTION INTRAMUSCULAR; INTRAVENOUS EVERY 6 HOURS PRN
Status: DISCONTINUED | OUTPATIENT
Start: 2018-02-20 | End: 2018-02-23 | Stop reason: HOSPADM

## 2018-02-20 RX ORDER — BISACODYL 10 MG
10 SUPPOSITORY, RECTAL RECTAL DAILY PRN
Status: DISCONTINUED | OUTPATIENT
Start: 2018-02-20 | End: 2018-02-23 | Stop reason: HOSPADM

## 2018-02-20 RX ORDER — POTASSIUM CHLORIDE 20MEQ/15ML
40 LIQUID (ML) ORAL PRN
Status: DISCONTINUED | OUTPATIENT
Start: 2018-02-20 | End: 2018-02-23 | Stop reason: HOSPADM

## 2018-02-20 RX ORDER — PROPRANOLOL HYDROCHLORIDE 10 MG/1
10 TABLET ORAL NIGHTLY
Status: DISCONTINUED | OUTPATIENT
Start: 2018-02-20 | End: 2018-02-23 | Stop reason: HOSPADM

## 2018-02-20 RX ORDER — KETOROLAC TROMETHAMINE 15 MG/ML
15 INJECTION, SOLUTION INTRAMUSCULAR; INTRAVENOUS ONCE
Status: COMPLETED | OUTPATIENT
Start: 2018-02-20 | End: 2018-02-20

## 2018-02-20 RX ORDER — ACETAMINOPHEN 325 MG/1
650 TABLET ORAL EVERY 4 HOURS PRN
Status: DISCONTINUED | OUTPATIENT
Start: 2018-02-20 | End: 2018-02-23 | Stop reason: HOSPADM

## 2018-02-20 RX ORDER — 0.9 % SODIUM CHLORIDE 0.9 %
80 INTRAVENOUS SOLUTION INTRAVENOUS ONCE
Status: COMPLETED | OUTPATIENT
Start: 2018-02-20 | End: 2018-02-20

## 2018-02-20 RX ORDER — POTASSIUM CHLORIDE 20 MEQ/1
40 TABLET, EXTENDED RELEASE ORAL PRN
Status: DISCONTINUED | OUTPATIENT
Start: 2018-02-20 | End: 2018-02-23 | Stop reason: HOSPADM

## 2018-02-20 RX ORDER — POTASSIUM CHLORIDE 7.45 MG/ML
10 INJECTION INTRAVENOUS PRN
Status: DISCONTINUED | OUTPATIENT
Start: 2018-02-20 | End: 2018-02-23 | Stop reason: HOSPADM

## 2018-02-20 RX ORDER — SODIUM CHLORIDE 1000 MG
2 TABLET, SOLUBLE MISCELLANEOUS ONCE
Status: COMPLETED | OUTPATIENT
Start: 2018-02-20 | End: 2018-02-20

## 2018-02-20 RX ORDER — MAGNESIUM SULFATE 1 G/100ML
1 INJECTION INTRAVENOUS PRN
Status: DISCONTINUED | OUTPATIENT
Start: 2018-02-20 | End: 2018-02-23 | Stop reason: HOSPADM

## 2018-02-20 RX ORDER — NICOTINE 21 MG/24HR
1 PATCH, TRANSDERMAL 24 HOURS TRANSDERMAL DAILY PRN
Status: DISCONTINUED | OUTPATIENT
Start: 2018-02-20 | End: 2018-02-23 | Stop reason: HOSPADM

## 2018-02-20 RX ORDER — MORPHINE SULFATE 4 MG/ML
4 INJECTION, SOLUTION INTRAMUSCULAR; INTRAVENOUS
Status: DISCONTINUED | OUTPATIENT
Start: 2018-02-20 | End: 2018-02-20

## 2018-02-20 RX ORDER — PROPRANOLOL HYDROCHLORIDE 10 MG/1
10 TABLET ORAL DAILY
Status: DISCONTINUED | OUTPATIENT
Start: 2018-02-20 | End: 2018-02-20

## 2018-02-20 RX ORDER — SODIUM CHLORIDE 9 MG/ML
INJECTION, SOLUTION INTRAVENOUS CONTINUOUS
Status: DISCONTINUED | OUTPATIENT
Start: 2018-02-20 | End: 2018-02-20

## 2018-02-20 RX ORDER — MORPHINE SULFATE 4 MG/ML
2 INJECTION, SOLUTION INTRAMUSCULAR; INTRAVENOUS
Status: DISCONTINUED | OUTPATIENT
Start: 2018-02-20 | End: 2018-02-20

## 2018-02-20 RX ORDER — METHYLPREDNISOLONE SODIUM SUCCINATE 125 MG/2ML
125 INJECTION, POWDER, LYOPHILIZED, FOR SOLUTION INTRAMUSCULAR; INTRAVENOUS ONCE
Status: COMPLETED | OUTPATIENT
Start: 2018-02-20 | End: 2018-02-20

## 2018-02-20 RX ORDER — ALPRAZOLAM 0.25 MG/1
0.5 TABLET ORAL ONCE
Status: COMPLETED | OUTPATIENT
Start: 2018-02-20 | End: 2018-02-20

## 2018-02-20 RX ORDER — SODIUM CHLORIDE 0.9 % (FLUSH) 0.9 %
10 SYRINGE (ML) INJECTION ONCE
Status: COMPLETED | OUTPATIENT
Start: 2018-02-20 | End: 2018-02-20

## 2018-02-20 RX ORDER — METHYLPREDNISOLONE SODIUM SUCCINATE 125 MG/2ML
125 INJECTION, POWDER, LYOPHILIZED, FOR SOLUTION INTRAMUSCULAR; INTRAVENOUS EVERY 12 HOURS
Status: DISCONTINUED | OUTPATIENT
Start: 2018-02-20 | End: 2018-02-21

## 2018-02-20 RX ORDER — OXYCODONE HYDROCHLORIDE AND ACETAMINOPHEN 5; 325 MG/1; MG/1
1 TABLET ORAL EVERY 4 HOURS PRN
Status: DISCONTINUED | OUTPATIENT
Start: 2018-02-20 | End: 2018-02-23 | Stop reason: HOSPADM

## 2018-02-20 RX ORDER — SODIUM CHLORIDE 0.9 % (FLUSH) 0.9 %
10 SYRINGE (ML) INJECTION PRN
Status: DISCONTINUED | OUTPATIENT
Start: 2018-02-20 | End: 2018-02-23 | Stop reason: HOSPADM

## 2018-02-20 RX ORDER — HYDROCODONE BITARTRATE AND ACETAMINOPHEN 5; 325 MG/1; MG/1
1 TABLET ORAL EVERY 4 HOURS PRN
Status: DISCONTINUED | OUTPATIENT
Start: 2018-02-20 | End: 2018-02-20

## 2018-02-20 RX ORDER — SODIUM CHLORIDE 0.9 % (FLUSH) 0.9 %
10 SYRINGE (ML) INJECTION EVERY 12 HOURS SCHEDULED
Status: DISCONTINUED | OUTPATIENT
Start: 2018-02-20 | End: 2018-02-23 | Stop reason: HOSPADM

## 2018-02-20 RX ORDER — POTASSIUM CHLORIDE 1.5 G/1.77G
20 POWDER, FOR SOLUTION ORAL 2 TIMES DAILY
Status: DISCONTINUED | OUTPATIENT
Start: 2018-02-20 | End: 2018-02-20

## 2018-02-20 RX ADMIN — ENOXAPARIN SODIUM 40 MG: 40 INJECTION SUBCUTANEOUS at 09:10

## 2018-02-20 RX ADMIN — Medication 10 ML: at 08:58

## 2018-02-20 RX ADMIN — METHYLPREDNISOLONE SODIUM SUCCINATE 125 MG: 125 INJECTION, POWDER, FOR SOLUTION INTRAMUSCULAR; INTRAVENOUS at 03:42

## 2018-02-20 RX ADMIN — DEXTROSE MONOHYDRATE: 5 INJECTION INTRAVENOUS at 03:43

## 2018-02-20 RX ADMIN — Medication 10 ML: at 02:11

## 2018-02-20 RX ADMIN — SODIUM CHLORIDE TAB 1 GM 2 G: 1 TAB at 18:53

## 2018-02-20 RX ADMIN — ONDANSETRON 4 MG: 2 INJECTION, SOLUTION INTRAMUSCULAR; INTRAVENOUS at 18:04

## 2018-02-20 RX ADMIN — OXYCODONE HYDROCHLORIDE AND ACETAMINOPHEN 1 TABLET: 5; 325 TABLET ORAL at 11:14

## 2018-02-20 RX ADMIN — MORPHINE SULFATE 4 MG: 4 INJECTION INTRAVENOUS at 05:50

## 2018-02-20 RX ADMIN — SODIUM CHLORIDE 80 ML: 9 INJECTION, SOLUTION INTRAVENOUS at 02:11

## 2018-02-20 RX ADMIN — CARBAMIDE PEROXIDE 6.5% 5 DROP: 6.5 LIQUID AURICULAR (OTIC) at 21:42

## 2018-02-20 RX ADMIN — METHYLPREDNISOLONE SODIUM SUCCINATE 125 MG: 125 INJECTION, POWDER, FOR SOLUTION INTRAMUSCULAR; INTRAVENOUS at 21:42

## 2018-02-20 RX ADMIN — PROPRANOLOL HYDROCHLORIDE 10 MG: 10 TABLET ORAL at 21:42

## 2018-02-20 RX ADMIN — IOPAMIDOL 75 ML: 755 INJECTION, SOLUTION INTRAVENOUS at 02:15

## 2018-02-20 RX ADMIN — SODIUM CHLORIDE TAB 1 GM 2 G: 1 TAB at 14:34

## 2018-02-20 RX ADMIN — ALPRAZOLAM 0.5 MG: 0.5 TABLET ORAL at 21:42

## 2018-02-20 RX ADMIN — SODIUM CHLORIDE: 9 INJECTION, SOLUTION INTRAVENOUS at 05:46

## 2018-02-20 RX ADMIN — ALPRAZOLAM 0.25 MG: 0.25 TABLET ORAL at 00:39

## 2018-02-20 RX ADMIN — KETOROLAC TROMETHAMINE 15 MG: 15 INJECTION, SOLUTION INTRAMUSCULAR; INTRAVENOUS at 01:32

## 2018-02-20 ASSESSMENT — PAIN DESCRIPTION - FREQUENCY: FREQUENCY: CONTINUOUS

## 2018-02-20 ASSESSMENT — ENCOUNTER SYMPTOMS
DIARRHEA: 0
SORE THROAT: 0
TROUBLE SWALLOWING: 0
SHORTNESS OF BREATH: 0
ABDOMINAL PAIN: 0
VOMITING: 0

## 2018-02-20 ASSESSMENT — PAIN SCALES - GENERAL
PAINLEVEL_OUTOF10: 0
PAINLEVEL_OUTOF10: 6
PAINLEVEL_OUTOF10: 0
PAINLEVEL_OUTOF10: 8
PAINLEVEL_OUTOF10: 3
PAINLEVEL_OUTOF10: 8
PAINLEVEL_OUTOF10: 10

## 2018-02-20 ASSESSMENT — PAIN DESCRIPTION - LOCATION: LOCATION: JAW

## 2018-02-20 ASSESSMENT — PAIN DESCRIPTION - ONSET: ONSET: ON-GOING

## 2018-02-20 ASSESSMENT — PAIN DESCRIPTION - PAIN TYPE: TYPE: ACUTE PAIN

## 2018-02-20 ASSESSMENT — PAIN DESCRIPTION - DESCRIPTORS: DESCRIPTORS: CONSTANT;ACHING

## 2018-02-20 NOTE — ED NOTES
Pt to ER with family, to room per wheelchair, transferred self to bed, steady gait. Pt reports jaw pain, on both sides, that started this afternoon, constant, denies falls or trauma to the face. Pt denies arm pain, CP or SOB, denies cardiac history. Pt rates pain 10/10, reports percocet x1 tab, reports no relief.  Pt calm, cooperative, holding face, moaning, but respers even non labored, skin warm pink, no distress, here for eval.      Prince Mohan, GREGORIO  02/19/18 8812

## 2018-02-20 NOTE — CONSULTS
See dictation for details    Blood pressure (!) 109/58, pulse 74, temperature 99 °F (37.2 °C), temperature source Axillary, resp. rate 17, height 5' 4\" (1.626 m), weight 117 lb 5 oz (53.2 kg), last menstrual period 06/30/1984, SpO2 94 %. Oral- floor of mouth soft, tongue normal, pharynx normal  Neck- min.  Edema of bilateral submandibular glands with mild tenderness but no induration    CT soft tissues: 2/20:   PHARYNX/LARYNX:  There is avid mucosal enhancement within the oropharynx and   hypopharynx without discrete lesion.  There is effacement of the piriform   sinuses bilaterally without discrete lesion.  Epiglottis is within normal   limits.  Subglottic airway is patent.  Nasopharynx is unremarkable.  Portions   of the oropharynx are obscured by dental mild comb streak artifact.       SALIVARY GLANDS/THYROID:  Heterogeneous attenuation of the submandibular   glands, greater on the right.  Stranding/edema surrounds the submandibular   glands.  Parotid glands are unremarkable.  Punctate hyperdensities within   both thyroid lobes.         Assessment  Submandibular Gland Sialoadenitis  Right Breat Carcinoma    Plan  Increase fluid hydration  Steroids for 1-2 days  Sour candies and lemon slices to promote salivation  Warm compresses and massage prn

## 2018-02-20 NOTE — H&P
 CATARACT REMOVAL WITH IMPLANT Left 12/06/2015    TONSILLECTOMY AND ADENOIDECTOMY Bilateral [id number]        Medications Prior to Admission:     Prior to Admission medications    Medication Sig Start Date End Date Taking? Authorizing Provider   NALTREXONE HCL PO Take by mouth   Yes Historical Provider, MD   propranolol (INDERAL) 10 MG tablet Take 10 mg by mouth daily   Yes Historical Provider, MD   potassium chloride (KLOR-CON) 20 MEQ packet Take 20 mEq by mouth 2 times daily   Yes Historical Provider, MD   predniSONE (DELTASONE) 20 MG tablet Take 20 mg by mouth daily   Yes Historical Provider, MD   Rice Noreen 0.5 MG tablet nightly as needed  5/7/14  Yes Historical Provider, MD        Allergies:     Review of patient's allergies indicates no known allergies. Social History:     Tobacco:    reports that she has never smoked. She has never used smokeless tobacco.  Alcohol:      reports that she does not drink alcohol. Drug Use:  reports that she does not use drugs. Family History:     Family History   Problem Relation Age of Onset    Hypertension Mother     Heart Failure Mother     Hypertension Father        Review of Systems:     Positive and Negative as described in HPI. CONSTITUTIONAL:  negative for fevers, chills, sweats, fatigue, weight loss  HEENT:  negative for vision, hearing changes, runny nose, throat pain  RESPIRATORY:  negative for shortness of breath, cough, congestion, wheezing. CARDIOVASCULAR:  negative for chest pain, palpitations.   GASTROINTESTINAL:  negative for nausea, vomiting, diarrhea, constipation, change in bowel habits, abdominal pain   GENITOURINARY:  negative for difficulty of urination, burning with urination, frequency   INTEGUMENT:  negative for rash, skin lesions, easy bruising   HEMATOLOGIC/LYMPHATIC:  negative for swelling/edema   ALLERGIC/IMMUNOLOGIC:  negative for urticaria , itching  ENDOCRINE:  negative increase in drinking, increase in urination, hot or cold intolerance  MUSCULOSKELETAL:  +joitn pains and swelling intermittently. NEUROLOGICAL:  negative for headaches, dizziness, lightheadedness, numbness, pain, tingling extremities  BEHAVIOR/PSYCH:  negative for depression, anxiety    Physical Exam:   BP (!) 109/58   Pulse 74   Temp 99 °F (37.2 °C) (Axillary)   Resp 17   Ht 5' 4\" (1.626 m)   Wt 117 lb 5 oz (53.2 kg)   LMP 1984   SpO2 94%   BMI 20.14 kg/m²   Temp (24hrs), Av.1 °F (36.7 °C), Min:97 °F (36.1 °C), Max:99 °F (37.2 °C)    No results for input(s): POCGLU in the last 72 hours. Intake/Output Summary (Last 24 hours) at 18 1425  Last data filed at 18 1200   Gross per 24 hour   Intake                0 ml   Output              250 ml   Net             -250 ml       General Appearance:  alert, mild distress sec to pain. Able to swallow oral secretions. Mental status: oriented to person, place, and time with flat affect  Head:  normocephalic, atraumatic. Eye: no icterus, redness, pupils equal and reactive, extraocular eye movements intact, conjunctiva clear  Ear: normal external ear, no discharge, hearing intact. B/l ear canals with wax. Minimal visualized TM appear normal.  Nose:  no drainage noted  Mouth: mucous membranes dry. Neck: supple, no carotid bruits, Fullness over submandibular area with slight skin induration noted. No warmth/ overlying erythema noted. Lungs: Bilateral equal air entry, clear to ausculation, no wheezing, rales or rhonchi, normal effort  Cardiovascular: normal rate, regular rhythm, no murmur, gallop, rub.   Abdomen: Soft, nontender, nondistended, normal bowel sounds, no hepatomegaly or splenomegaly  Neurologic: There are no new focal motor or sensory deficits, normal muscle tone and bulk, no abnormal sensation, normal speech, cranial nerves II through XII grossly intact  Skin: No gross lesions, rashes, bruising or bleeding on exposed skin area  Extremities:  peripheral pulses palpable, no pedal past), switch to Norco . Was on low dose naltrexone which was started recently per daughter. Hold for now. -Rpt thyroid function tests, check KURT panel.  - ENT eval for management of Sialadenitis. - Patient and family do not want Oncology evaluation at this time. Will revisit. Consultations:   IP CONSULT TO OTOLARYNGOLOGY  IP CONSULT TO NEPHROLOGY    Patient is admitted as inpatient status because of co-morbidities listed above, severity of signs and symptoms as outlined, requirement for current medical therapies and most importantly because of direct risk to patient if care not provided in a hospital setting.     Samson Teixeira MD  2/20/2018  2:25 PM    Copy sent to Dr. Charmayne Sons, MD

## 2018-02-20 NOTE — CONSULTS
8366 02/20/18 0553 02/20/18 0729 02/20/18 1118   BP: 129/81 139/83 133/81 (!) 109/58   Pulse: 72 75 70 74   Resp: 17 18 17 17   Temp:  97.6 °F (36.4 °C) 97 °F (36.1 °C) 99 °F (37.2 °C)   TempSrc:  Oral Axillary Axillary   SpO2: 93% 93% 96% 94%   Weight:  117 lb 5 oz (53.2 kg)     Height:  5' 4\" (1.626 m)       24HR INTAKE/OUTPUT:    Intake/Output Summary (Last 24 hours) at 02/20/18 1535  Last data filed at 02/20/18 1200   Gross per 24 hour   Intake                0 ml   Output              250 ml   Net             -250 ml       General appearance:Awake, alert, in no acute distress  Skin: warm and dry, no rash or erythema  Eyes: conjunctivae normal and sclera anicteric  ENT: no thrush no pharyngeal congestion  orodental hygiene   Neck: No JVD, Lymphadenopathty or thyromegaly  Respiratory: vesicular breath sounds,no wheeze/crackles  Cardiovascular: S1 S2 normal,no gallop or organic murmur. No carotid bruit  Abdomen:Non tender/non distended. Bowel sounds present  Extremities: No Cyanosis or Clubbing,Lower extremity edema  Neurological:Alert and oriented. No abnormalities of mood, affect, memory, mentation, or behavior are noted    INVESTIGATIONS      CBC: Recent Labs      02/19/18   2252   WBC  5.8   RBC  3.77*   HGB  12.2   HCT  36.2   MCV  96.0   MCH  32.3   MCHC  33.7   RDW  15.4   PLT  342   MPV  6.7      BMP: Recent Labs      02/19/18   2252  02/20/18   0601  02/20/18   1250   NA  129*  126*  125*  126*   K  4.7  5.1   --    CL  91*  90*   --    CO2  23  20   --    BUN  25*  23   --    CREATININE  0.63  0.61   --    GLUCOSE  117*  113*   --    CALCIUM  9.3  8.4*   --         Phosphorus:  No results for input(s): PHOS in the last 72 hours. Magnesium:   Recent Labs      02/19/18   2252   MG  1.9     Albumin: No results for input(s): LABALBU in the last 72 hours. Radiology:  Reviewed as available. ASSESSMENT     1. Hyponatremia from ADH excess related to pain  2. Admitted with jaw pain. Imaging studies cw sialdenitis  3. ABEL and dsDNA positive  4. Hx of Ca Breast- doing natural therpay     PLAN:     1. dc fluids  2. Fluid restriction  3. Salt tab  4. Check Randi/Uosm  5. Check UPC  6. Will follow    Thank you for the consultation. Please do not hesitate to call with questions. This note is created with the assistance of a speech-recognition program. While intending to generate a document that actually reflects the content of the visit, no guarantees can be provided that every mistake has been identified and corrected by editing.     Zulema Chapman MD, MRCMARK Casas, FACP   2/20/2018 3:35 PM    [facility]

## 2018-02-20 NOTE — ED PROVIDER NOTES
pain TECHNOLOGIST PROVIDED HISTORY: Reason for exam:->Chest pain Ordering Physician Provided Reason for Exam: C/o bilateral jaw pain, that started this afternoon, constant, denies falls or trauma to the face. Pt denies arm pain, CP or SOB, denies cardiac history. Acuity: Acute Type of Exam: Initial Relevant Medical/Surgical History: Hx breast ca FINDINGS: Cardiomediastinal contour is within normal limits. No focal consolidation. No significant pleural effusion. Hyperinflation suggestive of COPD. 1. No acute cardiopulmonary disease. Ct Soft Tissue Neck W Contrast    Diffuse soft tissue swelling within the anterior neck and submandibular regions with involvement of the bilateral submandibular glands. Correlate for infectious or inflammatory sialadenitis. Alternatively, angioedema/lymphedema or cellulitis could be considerations in the appropriate setting. Punctate thyroid calcifications. Findings were discussed with Tevin Gore at 3:01 am on 2/20/2018.        LABS:  Results for orders placed or performed during the hospital encounter of 02/19/18   CBC Auto Differential   Result Value Ref Range    WBC 5.8 3.5 - 11.0 k/uL    RBC 3.77 (L) 4.0 - 5.2 m/uL    Hemoglobin 12.2 12.0 - 16.0 g/dL    Hematocrit 36.2 36 - 46 %    MCV 96.0 80 - 100 fL    MCH 32.3 26 - 34 pg    MCHC 33.7 31 - 37 g/dL    RDW 15.4 12.5 - 15.4 %    Platelets 714 671 - 719 k/uL    MPV 6.7 6.0 - 12.0 fL    NRBC Automated NOT REPORTED per 100 WBC    Differential Type NOT REPORTED     Seg Neutrophils 84 (H) 36 - 66 %    Lymphocytes 10 (L) 24 - 44 %    Monocytes 5 2 - 11 %    Eosinophils % 0 (L) 1 - 4 %    Basophils 1 0 - 2 %    Immature Granulocytes NOT REPORTED 0 %    Segs Absolute 4.90 1.8 - 7.7 k/uL    Absolute Lymph # 0.60 (L) 1.0 - 4.8 k/uL    Absolute Mono # 0.30 0.1 - 1.2 k/uL    Absolute Eos # 0.00 0.0 - 0.4 k/uL    Basophils # 0.00 0.0 - 0.2 k/uL    Absolute Immature Granulocyte NOT REPORTED 0.00 - 0.30 k/uL    WBC Morphology NOT REPORTED     RBC Morphology NOT REPORTED     Platelet Estimate NOT REPORTED    Basic Metabolic Panel   Result Value Ref Range    Glucose 117 (H) 70 - 99 mg/dL    BUN 25 (H) 8 - 23 mg/dL    CREATININE 0.63 0.50 - 0.90 mg/dL    Bun/Cre Ratio NOT REPORTED 9 - 20    Calcium 9.3 8.6 - 10.4 mg/dL    Sodium 129 (L) 135 - 144 mmol/L    Potassium 4.7 3.7 - 5.3 mmol/L    Chloride 91 (L) 98 - 107 mmol/L    CO2 23 20 - 31 mmol/L    Anion Gap 15 9 - 17 mmol/L    GFR Non-African American >60 >60 mL/min    GFR African American >60 >60 mL/min    GFR Comment          GFR Staging NOT REPORTED    TROP/MYOGLOBIN   Result Value Ref Range    Troponin T <0.03 <0.03 ng/mL    Troponin Interp          Myoglobin 105 (H) 25 - 58 ng/mL   Magnesium   Result Value Ref Range    Magnesium 1.9 1.6 - 2.6 mg/dL       Reviewed all the lab results, patient has hyponatremia otherwise rest of the labs are unremarkable.   Her previous electrolytes have been normal.    EMERGENCY DEPARTMENT COURSE:   Vitals:    Vitals:    02/20/18 0032 02/20/18 0148 02/20/18 0241 02/20/18 0416   BP: (!) 167/88 (!) 142/91 121/68 129/81   Pulse: 63 76 76 72   Resp: 13 18 13 17   Temp:       TempSrc:       SpO2: 97% 94% 93% 93%   Weight:       Height:         -------------------------  BP: 129/81, Temp: 98.6 °F (37 °C), Pulse: 72, Resp: 17    Orders Placed This Encounter   Medications    aspirin chewable tablet 162 mg    fentaNYL (SUBLIMAZE) injection 25 mcg    DISCONTD: Tetanus-Diphth-Acell Pertussis (BOOSTRIX) injection 0.5 mL    ketorolac (TORADOL) injection 15 mg    0.9 % sodium chloride bolus    fentaNYL (SUBLIMAZE) injection 50 mcg    ALPRAZolam (XANAX) tablet 0.5 mg    ketorolac (TORADOL) injection 15 mg    0.9 % sodium chloride bolus    sodium chloride flush 0.9 % injection 10 mL    iopamidol (ISOVUE-370) 76 % injection 75 mL    methylPREDNISolone sodium (SOLU-MEDROL) injection 125 mg    DISCONTD: piperacillin-tazobactam (ZOSYN) 3.375 g in dextrose 50 mL

## 2018-02-20 NOTE — CARE COORDINATION
Case Management Initial Discharge Plan  Kunal Perez,         Readmission Risk              Readmission Risk:        7.5       Age 72 or Greater:  1    Admitted from SNF or Requires Paid or Family Care:  0    Currently has CHF,COPD,ARF,CRI,or is on dialysis:  0    Takes more than 5 Prescription Medications:  4    Takes Digoxin,Insulin,Anticoagulants,Narcotics or ASA/Plavix:  [address] in Past 12 Months:  0    On Disability:  0    Patient Considers own Health:  2.5            Met with:family member patient and daughter to discuss discharge plans. Information verified: address, contacts, phone number, , insurance Yes  PCP: Kareem Mccollum MD  Date of last visit: 2018    Insurance Provider: MEDICARE PART A&B, [address] MEDICARE    Discharge Planning  Current Residence:  Private Residence  Living Arrangements:  Spouse/Significant Other, Children   Home has 2 stories/1 stairs to climb  Support Systems:  Spouse/Significant Other, Children  Current Services PTA:  None Supplier: NONE  Patient able to perform ADL's:Assisted  DME used to aid ambulation prior to admission: WALKER/during admission:TBD    Potential Assistance Needed:  N/A    Pharmacy: [facility] (meds to beds)   Potential Assistance Purchasing Medications:  No  Does patient want to participate in local refill/ meds to beds program?  No    Patient agreeable to home care: No  [place] of choice provided:  n/a      Type of Home Care Services:  None  Patient expects to be discharged to:  Home    Prior SNF/Rehab Placement and Facility: no  Agreeable to SNF/Rehab: No  [place] of choice provided: n/a   Evaluation: n/a    Expected Discharge date: Follow Up Appointment: Best Day/ Time: Monday AM    Transportation provider: daughter  Transportation arrangements needed for discharge: No    Discharge Plan: patient to return home with support of family. Patients daughter is primary caregiver.  Will monitor patient's clinical

## 2018-02-21 LAB
ANION GAP SERPL CALCULATED.3IONS-SCNC: 13 MMOL/L (ref 9–17)
ANTI RNP AB: 89 U/ML
ANTI SSA: 38 U/ML
ANTI SSB: 45 U/ML
ANTI-SCLERODERMA: 92 U/ML
ANTI-SMITH: 26 U/ML
BUN BLDV-MCNC: 19 MG/DL (ref 8–23)
BUN/CREAT BLD: ABNORMAL (ref 9–20)
CALCIUM SERPL-MCNC: 8 MG/DL (ref 8.6–10.4)
CHLORIDE BLD-SCNC: 94 MMOL/L (ref 98–107)
CO2: 20 MMOL/L (ref 20–31)
CORTISOL COLLECTION INFO: ABNORMAL
CORTISOL: 24.9 UG/DL (ref 2.7–18.4)
CREAT SERPL-MCNC: 0.49 MG/DL (ref 0.5–0.9)
CREATININE URINE: 39.2 MG/DL (ref 28–217)
GFR AFRICAN AMERICAN: >60 ML/MIN
GFR NON-AFRICAN AMERICAN: >60 ML/MIN
GFR SERPL CREATININE-BSD FRML MDRD: ABNORMAL ML/MIN/{1.73_M2}
GFR SERPL CREATININE-BSD FRML MDRD: ABNORMAL ML/MIN/{1.73_M2}
GLUCOSE BLD-MCNC: 107 MG/DL (ref 70–99)
HCT VFR BLD CALC: 35.8 % (ref 36.3–47.1)
HEMOGLOBIN: 11.5 G/DL (ref 11.9–15.1)
INR BLD: 0.9
MCH RBC QN AUTO: 31.9 PG (ref 25.2–33.5)
MCHC RBC AUTO-ENTMCNC: 32.1 G/DL (ref 28.4–34.8)
MCV RBC AUTO: 99.2 FL (ref 82.6–102.9)
NRBC AUTOMATED: 0 PER 100 WBC
OSMOLALITY URINE: 357 MOSM/KG (ref 80–1300)
PDW BLD-RTO: 15.6 % (ref 11.8–14.4)
PLATELET # BLD: 325 K/UL (ref 138–453)
PMV BLD AUTO: 9.3 FL (ref 8.1–13.5)
POTASSIUM SERPL-SCNC: 4.8 MMOL/L (ref 3.7–5.3)
PROTHROMBIN TIME: 9.9 SEC (ref 9–12)
RBC # BLD: 3.61 M/UL (ref 3.95–5.11)
SODIUM BLD-SCNC: 125 MMOL/L (ref 135–144)
SODIUM BLD-SCNC: 127 MMOL/L (ref 135–144)
SODIUM BLD-SCNC: 127 MMOL/L (ref 135–144)
SODIUM BLD-SCNC: 130 MMOL/L (ref 135–144)
SODIUM,UR: 30 MMOL/L
TOTAL PROTEIN, URINE: 19 MG/DL
TSH SERPL DL<=0.05 MIU/L-ACNC: 2.62 MIU/L (ref 0.3–5)
URIC ACID: 2.5 MG/DL (ref 2.4–5.7)
WBC # BLD: 4.4 K/UL (ref 3.5–11.3)

## 2018-02-21 PROCEDURE — 2580000003 HC RX 258: Performed by: NURSE PRACTITIONER

## 2018-02-21 PROCEDURE — 2060000000 HC ICU INTERMEDIATE R&B

## 2018-02-21 PROCEDURE — 82533 TOTAL CORTISOL: CPT

## 2018-02-21 PROCEDURE — 6360000002 HC RX W HCPCS: Performed by: INTERNAL MEDICINE

## 2018-02-21 PROCEDURE — 84443 ASSAY THYROID STIM HORMONE: CPT

## 2018-02-21 PROCEDURE — 80048 BASIC METABOLIC PNL TOTAL CA: CPT

## 2018-02-21 PROCEDURE — 83935 ASSAY OF URINE OSMOLALITY: CPT

## 2018-02-21 PROCEDURE — 6370000000 HC RX 637 (ALT 250 FOR IP): Performed by: NURSE PRACTITIONER

## 2018-02-21 PROCEDURE — 84295 ASSAY OF SERUM SODIUM: CPT

## 2018-02-21 PROCEDURE — 84156 ASSAY OF PROTEIN URINE: CPT

## 2018-02-21 PROCEDURE — 84300 ASSAY OF URINE SODIUM: CPT

## 2018-02-21 PROCEDURE — 6370000000 HC RX 637 (ALT 250 FOR IP): Performed by: INTERNAL MEDICINE

## 2018-02-21 PROCEDURE — 6360000002 HC RX W HCPCS: Performed by: NURSE PRACTITIONER

## 2018-02-21 PROCEDURE — 84550 ASSAY OF BLOOD/URIC ACID: CPT

## 2018-02-21 PROCEDURE — 94762 N-INVAS EAR/PLS OXIMTRY CONT: CPT

## 2018-02-21 PROCEDURE — 2580000003 HC RX 258: Performed by: INTERNAL MEDICINE

## 2018-02-21 PROCEDURE — 2500000003 HC RX 250 WO HCPCS: Performed by: INTERNAL MEDICINE

## 2018-02-21 PROCEDURE — 99232 SBSQ HOSP IP/OBS MODERATE 35: CPT | Performed by: INTERNAL MEDICINE

## 2018-02-21 PROCEDURE — 85610 PROTHROMBIN TIME: CPT

## 2018-02-21 PROCEDURE — 85027 COMPLETE CBC AUTOMATED: CPT

## 2018-02-21 PROCEDURE — 82570 ASSAY OF URINE CREATININE: CPT

## 2018-02-21 PROCEDURE — 36415 COLL VENOUS BLD VENIPUNCTURE: CPT

## 2018-02-21 RX ORDER — PREDNISONE 10 MG/1
10 TABLET ORAL DAILY
Status: DISCONTINUED | OUTPATIENT
Start: 2018-02-28 | End: 2018-02-22

## 2018-02-21 RX ORDER — CALCIUM CARBONATE 200(500)MG
500 TABLET,CHEWABLE ORAL 3 TIMES DAILY PRN
Status: DISCONTINUED | OUTPATIENT
Start: 2018-02-21 | End: 2018-02-23 | Stop reason: HOSPADM

## 2018-02-21 RX ORDER — 0.9 % SODIUM CHLORIDE 0.9 %
10 VIAL (ML) INJECTION ONCE
Status: COMPLETED | OUTPATIENT
Start: 2018-02-21 | End: 2018-02-21

## 2018-02-21 RX ORDER — FUROSEMIDE 10 MG/ML
20 INJECTION INTRAMUSCULAR; INTRAVENOUS ONCE
Status: COMPLETED | OUTPATIENT
Start: 2018-02-21 | End: 2018-02-21

## 2018-02-21 RX ORDER — ESOMEPRAZOLE SODIUM 40 MG/5ML
40 INJECTION INTRAVENOUS ONCE
Status: COMPLETED | OUTPATIENT
Start: 2018-02-21 | End: 2018-02-21

## 2018-02-21 RX ORDER — PREDNISONE 20 MG/1
20 TABLET ORAL DAILY
Status: DISCONTINUED | OUTPATIENT
Start: 2018-02-22 | End: 2018-02-22

## 2018-02-21 RX ORDER — SODIUM CHLORIDE 1000 MG
2 TABLET, SOLUBLE MISCELLANEOUS
Status: DISCONTINUED | OUTPATIENT
Start: 2018-02-21 | End: 2018-02-23 | Stop reason: HOSPADM

## 2018-02-21 RX ADMIN — ENOXAPARIN SODIUM 40 MG: 40 INJECTION SUBCUTANEOUS at 08:47

## 2018-02-21 RX ADMIN — CARBAMIDE PEROXIDE 6.5% 5 DROP: 6.5 LIQUID AURICULAR (OTIC) at 08:47

## 2018-02-21 RX ADMIN — METHYLPREDNISOLONE SODIUM SUCCINATE 125 MG: 125 INJECTION, POWDER, FOR SOLUTION INTRAMUSCULAR; INTRAVENOUS at 10:02

## 2018-02-21 RX ADMIN — SODIUM CHLORIDE TAB 1 GM 2 G: 1 TAB at 20:35

## 2018-02-21 RX ADMIN — ALPRAZOLAM 0.5 MG: 0.5 TABLET ORAL at 22:01

## 2018-02-21 RX ADMIN — ONDANSETRON 4 MG: 2 INJECTION, SOLUTION INTRAMUSCULAR; INTRAVENOUS at 11:49

## 2018-02-21 RX ADMIN — ANTACID TABLETS 500 MG: 500 TABLET, CHEWABLE ORAL at 14:31

## 2018-02-21 RX ADMIN — Medication 10 ML: at 08:49

## 2018-02-21 RX ADMIN — ESOMEPRAZOLE SODIUM 40 MG: 40 INJECTION INTRAVENOUS at 18:44

## 2018-02-21 RX ADMIN — PROPRANOLOL HYDROCHLORIDE 10 MG: 10 TABLET ORAL at 20:35

## 2018-02-21 RX ADMIN — METHYLPREDNISOLONE SODIUM SUCCINATE 125 MG: 125 INJECTION, POWDER, FOR SOLUTION INTRAMUSCULAR; INTRAVENOUS at 22:01

## 2018-02-21 RX ADMIN — Medication 10 ML: at 18:44

## 2018-02-21 RX ADMIN — CARBAMIDE PEROXIDE 6.5% 5 DROP: 6.5 LIQUID AURICULAR (OTIC) at 20:36

## 2018-02-21 RX ADMIN — FUROSEMIDE 20 MG: 10 INJECTION, SOLUTION INTRAMUSCULAR; INTRAVENOUS at 13:56

## 2018-02-21 RX ADMIN — SODIUM CHLORIDE TAB 1 GM 2 G: 1 TAB at 13:56

## 2018-02-21 RX ADMIN — Medication 10 ML: at 20:35

## 2018-02-21 NOTE — CONSULTS
[facility]                                   CONSULTATION    PATIENT NAME: Joe Doe                   :        1934  MED REC NO:   [id number]                             ROOM:       0533  ACCOUNT NO:   [de-identified]                           ADMIT DATE: 2018  PROVIDER:     Kenneth Kemp    CONSULT DATE:  2018    CONSULTING PHYSICIAN:  Newton Martins MD    REASON FOR CONSULTATION:  Submandibular gland swelling. CHIEF COMPLAINT:  \"My neck was swollen. \"    HISTORY OF PRESENT ILLNESS:  The patient is an 80-year-old female with a  history of untreated invasive ductal carcinoma of the right breast who has  been undergoing homeopathic therapy for this. Apparently, she had some  swelling to both sides of her neck yesterday which prompted her to come  into the ER. The pain was severe, and it was uncontrolled with Advil or  Percocet. She also had some mild chest pain which has since resolved, and  she has been on prednisone for the last 10 days for this. She was given  some IV Decadron last night, and the swelling has gone down significantly. She is  in the bilateral cervical neck, but nothing like she  was yesterday. No fevers, no chills, no diarrhea, no constipation, no  nausea, no vomiting. PAST MEDICAL HISTORY:  As stated above; otherwise, decreased appetite, high  cholesterol, anxiety, nausea, vomiting previously in the past.    PAST SURGICAL HISTORY:  Appendectomy, tonsillectomy, adenoidectomy,  cataract removals. MEDICATION LIST:  Reviewed. Please refer the chart for the full list.    ALLERGIES:  None known. SOCIAL HISTORY:  She is a nonsmoker. Denies alcohol or drug abuse. FAMILY HISTORY:  Significant for hypertension and heart failure. REVIEW OF SYSTEMS:  As stated above. Otherwise, all systems checked and  found to be negative.     DIAGNOSTIC

## 2018-02-21 NOTE — PROGRESS NOTES
NEPHROLOGY PROGRESS NOTE      SUBJECTIVE     Optimal diuresis  Hemodynamically stable  PO Optimal  Denies SOB/Chest pain/LE edema  Uosm 30 and Uos 357    OBJECTIVE     Vitals:    02/20/18 1915 02/20/18 2335 02/21/18 0350 02/21/18 0720   BP: (!) 92/52 (!) 108/53 (!) 124/53 (!) 100/56   Pulse: 75 62 71 75   Resp: 14 16 16 14   Temp: 97.1 °F (36.2 °C) 98.6 °F (37 °C) 98.4 °F (36.9 °C) 98.5 °F (36.9 °C)   TempSrc: Oral Oral Oral Oral   SpO2: 94% 95% 96% 95%   Weight:       Height:         24HR INTAKE/OUTPUT:    Intake/Output Summary (Last 24 hours) at 02/21/18 1048  Last data filed at 02/21/18 5283   Gross per 24 hour   Intake             1471 ml   Output              250 ml   Net             1221 ml       General appearance:Awake, alert, in no acute distress  HEENT: PERRLA  Respiratory::vesicular breath sounds,no wheeze/crackles  Cardiovascular:S1 S2 normal,no gallop or organic murmur. Abdomen:Non tender/non distended. Bowel sounds present  Extremities: No Cyanosis or Clubbing,Lower extremity edema  Neurological:Alert and oriented. No abnormalities of mood, affect, memory, mentation, or behavior are noted      MEDICATIONS     Scheduled Meds:    sodium chloride  2 g Oral TID WC    furosemide  20 mg Intravenous Once    sodium chloride flush  10 mL Intravenous 2 times per day    enoxaparin  40 mg Subcutaneous Daily    propranolol  10 mg Oral Nightly    methylPREDNISolone  125 mg Intravenous Q12H    carbamide peroxide  5 drop Both Ears BID     Continuous Infusions:    PRN Meds:  ALPRAZolam, sodium chloride flush, potassium chloride **OR** potassium chloride **OR** potassium chloride, magnesium sulfate, acetaminophen, magnesium hydroxide, bisacodyl, ondansetron, nicotine, oxyCODONE-acetaminophen  Home Meds:                Prescriptions Prior to Admission: NALTREXONE HCL PO, Take by mouth  propranolol (INDERAL) 10 MG tablet, Take 10 mg by mouth daily  potassium chloride (KLOR-CON) 20 MEQ packet, Take 20 mEq by mouth 2

## 2018-02-22 PROBLEM — E03.8 HYPOTHYROIDISM DUE TO HASHIMOTO'S THYROIDITIS: Status: ACTIVE | Noted: 2018-02-22

## 2018-02-22 PROBLEM — E06.3 HYPOTHYROIDISM DUE TO HASHIMOTO'S THYROIDITIS: Status: ACTIVE | Noted: 2018-02-22

## 2018-02-22 LAB
ANION GAP SERPL CALCULATED.3IONS-SCNC: 12 MMOL/L (ref 9–17)
BUN BLDV-MCNC: 21 MG/DL (ref 8–23)
BUN/CREAT BLD: ABNORMAL (ref 9–20)
CALCIUM SERPL-MCNC: 8.8 MG/DL (ref 8.6–10.4)
CHLORIDE BLD-SCNC: 94 MMOL/L (ref 98–107)
CO2: 26 MMOL/L (ref 20–31)
CREAT SERPL-MCNC: 0.55 MG/DL (ref 0.5–0.9)
GFR AFRICAN AMERICAN: >60 ML/MIN
GFR NON-AFRICAN AMERICAN: >60 ML/MIN
GFR SERPL CREATININE-BSD FRML MDRD: ABNORMAL ML/MIN/{1.73_M2}
GFR SERPL CREATININE-BSD FRML MDRD: ABNORMAL ML/MIN/{1.73_M2}
GLUCOSE BLD-MCNC: 116 MG/DL (ref 70–99)
POTASSIUM SERPL-SCNC: 4.4 MMOL/L (ref 3.7–5.3)
SODIUM BLD-SCNC: 128 MMOL/L (ref 135–144)
SODIUM BLD-SCNC: 132 MMOL/L (ref 135–144)
SODIUM BLD-SCNC: 134 MMOL/L (ref 135–144)
SODIUM BLD-SCNC: 138 MMOL/L (ref 135–144)

## 2018-02-22 PROCEDURE — 36415 COLL VENOUS BLD VENIPUNCTURE: CPT

## 2018-02-22 PROCEDURE — 80048 BASIC METABOLIC PNL TOTAL CA: CPT

## 2018-02-22 PROCEDURE — 99232 SBSQ HOSP IP/OBS MODERATE 35: CPT | Performed by: INTERNAL MEDICINE

## 2018-02-22 PROCEDURE — 84295 ASSAY OF SERUM SODIUM: CPT

## 2018-02-22 PROCEDURE — 2580000003 HC RX 258: Performed by: NURSE PRACTITIONER

## 2018-02-22 PROCEDURE — 94762 N-INVAS EAR/PLS OXIMTRY CONT: CPT

## 2018-02-22 PROCEDURE — 6370000000 HC RX 637 (ALT 250 FOR IP): Performed by: INTERNAL MEDICINE

## 2018-02-22 PROCEDURE — 2060000000 HC ICU INTERMEDIATE R&B

## 2018-02-22 RX ORDER — ALPRAZOLAM 0.5 MG/1
0.5 TABLET ORAL 2 TIMES DAILY PRN
Status: DISCONTINUED | OUTPATIENT
Start: 2018-02-22 | End: 2018-02-23 | Stop reason: HOSPADM

## 2018-02-22 RX ORDER — SODIUM CHLORIDE 1000 MG
2 TABLET, SOLUBLE MISCELLANEOUS 2 TIMES DAILY
Qty: 20 TABLET | Refills: 0 | Status: SHIPPED | OUTPATIENT
Start: 2018-02-22 | End: 2018-02-23

## 2018-02-22 RX ORDER — PANTOPRAZOLE SODIUM 40 MG/1
40 TABLET, DELAYED RELEASE ORAL
Status: DISCONTINUED | OUTPATIENT
Start: 2018-02-22 | End: 2018-02-23 | Stop reason: HOSPADM

## 2018-02-22 RX ADMIN — CARBAMIDE PEROXIDE 6.5% 5 DROP: 6.5 LIQUID AURICULAR (OTIC) at 20:51

## 2018-02-22 RX ADMIN — Medication 10 ML: at 20:51

## 2018-02-22 RX ADMIN — SODIUM CHLORIDE TAB 1 GM 2 G: 1 TAB at 09:55

## 2018-02-22 RX ADMIN — ALPRAZOLAM 0.5 MG: 0.5 TABLET ORAL at 14:43

## 2018-02-22 RX ADMIN — SODIUM CHLORIDE TAB 1 GM 2 G: 1 TAB at 17:48

## 2018-02-22 RX ADMIN — PROPRANOLOL HYDROCHLORIDE 10 MG: 10 TABLET ORAL at 20:51

## 2018-02-22 RX ADMIN — PANTOPRAZOLE SODIUM 40 MG: 40 TABLET, DELAYED RELEASE ORAL at 10:44

## 2018-02-22 RX ADMIN — Medication 10 ML: at 09:56

## 2018-02-22 RX ADMIN — CARBAMIDE PEROXIDE 6.5% 5 DROP: 6.5 LIQUID AURICULAR (OTIC) at 09:56

## 2018-02-22 RX ADMIN — SODIUM CHLORIDE TAB 1 GM 2 G: 1 TAB at 12:50

## 2018-02-22 RX ADMIN — PREDNISONE 60 MG: 50 TABLET ORAL at 10:44

## 2018-02-22 ASSESSMENT — PAIN DESCRIPTION - PAIN TYPE
TYPE: ACUTE PAIN

## 2018-02-22 ASSESSMENT — PAIN DESCRIPTION - LOCATION
LOCATION: JAW

## 2018-02-22 ASSESSMENT — PAIN SCALES - GENERAL
PAINLEVEL_OUTOF10: 4
PAINLEVEL_OUTOF10: 3
PAINLEVEL_OUTOF10: 1

## 2018-02-22 NOTE — PROGRESS NOTES
NEPHROLOGY PROGRESS NOTE      SUBJECTIVE     Recent diuresis. Blood pressure stable. Tolerating oral intake well. He received salt tablets along with Lasix yesterday. Sodium improving to 132. Pain getting better. ENT review noted. OBJECTIVE     Vitals:    02/21/18 2350 02/22/18 0430 02/22/18 0745 02/22/18 1011   BP: 109/60 110/63 114/66    Pulse: 63 74 66 76   Resp: 14 17 14    Temp: 98.1 °F (36.7 °C) 98.2 °F (36.8 °C) 97.9 °F (36.6 °C)    TempSrc: Oral Oral Oral    SpO2:   97%    Weight:       Height:         24HR INTAKE/OUTPUT:      Intake/Output Summary (Last 24 hours) at 02/22/18 1014  Last data filed at 02/22/18 0602   Gross per 24 hour   Intake              975 ml   Output             1350 ml   Net             -375 ml       General appearance:Awake, alert, in no acute distress  HEENT: PERRLA  Respiratory::vesicular breath sounds,no wheeze/crackles  Cardiovascular:S1 S2 normal,no gallop or organic murmur. Abdomen:Non tender/non distended. Bowel sounds present  Extremities: No Cyanosis or Clubbing,Lower extremity edema  Neurological:Alert and oriented. No abnormalities of mood, affect, memory, mentation, or behavior are noted      MEDICATIONS     Scheduled Meds:    predniSONE  60 mg Oral Daily    pantoprazole  40 mg Oral QAM AC    sodium chloride  2 g Oral TID WC    sodium chloride flush  10 mL Intravenous 2 times per day    enoxaparin  40 mg Subcutaneous Daily    propranolol  10 mg Oral Nightly    carbamide peroxide  5 drop Both Ears BID     Continuous Infusions:    PRN Meds:  calcium carbonate, ALPRAZolam, sodium chloride flush, potassium chloride **OR** potassium chloride **OR** potassium chloride, magnesium sulfate, acetaminophen, magnesium hydroxide, bisacodyl, ondansetron, nicotine, oxyCODONE-acetaminophen  Home Meds:                Prescriptions Prior to Admission: NALTREXONE HCL PO, Take by mouth  propranolol (INDERAL) 10 MG tablet, Take 10 mg by mouth daily  potassium chloride (KLOR-CON)

## 2018-02-22 NOTE — PROGRESS NOTES
Pt free of falls this shift. Writer assessed and educated pt on falling precautions. Pt verbalized understanding. Pt bed locked and in lowest position with non-skid footware on. Pt room kept free of clutter with adequate lighting. Pt call light within reach and calls out appropriately. Will continue to monitor with hourly rounding.

## 2018-02-22 NOTE — PROGRESS NOTES
[facility]    Progress Note    2/22/2018    6:18 PM    Name:   Nahomy Gaspar  MRN:     [id number]     Acct:      [de-identified]   Room:   [address] [place] [address] Day:  2  Admit Date:  2/19/2018  9:59 PM    PCP:   Hue Restrepo MD  Code Status:  Full Code    Subjective:     C/C:   Chief Complaint   Patient presents with    Jaw Pain     Interval History Status: improved. Continues to c/o jaw pain Rt>Left.   ' food just doesn't taste good'. No fever/chills. Has some pain in hands. Labs from this AM: Na-134, Cr 0.55 other labs reviewed. Brief History:   79 y/o with untreated invasive ductal Ca. Of Right breast presented to [facility] ED with c/o Lower jaw pain radiating into both sides of neck, ears, 10/10 severe , uncontrolled with Advil+percocet on and off x3 weeks but progressively worse since yesterday afternoon. Noted to have chest pain in ED that resolved within minutes. Daughter provides recent history of multiple joint pains since October, mostly in hands and b/l elbows. No overlying redness +swelling over joints at the time. Was seen by PCP , referred to Rheumatology and was being worked up for 'Arthitis' and started on Prednisone daily sine 2/8/2018. Has not established care for Breast Cancer and doing 'natural therapy'. Was recently started on LDN to help with inflammation by PCP  - Daughter gave list of ' natural medications' patient is on and indicates she follows two ' MD naturopathy doctors' and was told her Breast cancer ' is stable' based on tumor markers which they check every 3 mo last in October.   - Wants to start natural thyroid supplements. Has dose written down as advised by her naturopathic doctor.   Review of Systems:     Constitutional:  negative for chills, fevers, sweats  Respiratory:  negative for cough, dyspnea on exertion, hemoptysis, shortness of breath, wheezing  Cardiovascular:  negative for chest

## 2018-02-23 ENCOUNTER — HOSPITAL ENCOUNTER (OUTPATIENT)
Dept: INFUSION THERAPY | Facility: MEDICAL CENTER | Age: 83
Discharge: HOME | End: 2018-02-23

## 2018-02-23 VITALS
WEIGHT: 117.31 LBS | TEMPERATURE: 98.3 F | SYSTOLIC BLOOD PRESSURE: 133 MMHG | BODY MASS INDEX: 20.03 KG/M2 | HEIGHT: 64 IN | RESPIRATION RATE: 15 BRPM | OXYGEN SATURATION: 94 % | DIASTOLIC BLOOD PRESSURE: 68 MMHG | HEART RATE: 63 BPM

## 2018-02-23 LAB
SODIUM BLD-SCNC: 132 MMOL/L (ref 135–144)
SODIUM BLD-SCNC: 133 MMOL/L (ref 135–144)
SODIUM BLD-SCNC: 139 MMOL/L (ref 135–144)

## 2018-02-23 PROCEDURE — 99239 HOSP IP/OBS DSCHRG MGMT >30: CPT | Performed by: INTERNAL MEDICINE

## 2018-02-23 PROCEDURE — 6370000000 HC RX 637 (ALT 250 FOR IP): Performed by: INTERNAL MEDICINE

## 2018-02-23 PROCEDURE — 36415 COLL VENOUS BLD VENIPUNCTURE: CPT

## 2018-02-23 PROCEDURE — 84295 ASSAY OF SERUM SODIUM: CPT

## 2018-02-23 PROCEDURE — 2580000003 HC RX 258: Performed by: NURSE PRACTITIONER

## 2018-02-23 RX ORDER — PREDNISONE 20 MG/1
TABLET ORAL
Qty: 21 TABLET | Refills: 0 | Status: SHIPPED | OUTPATIENT
Start: 2018-02-23 | End: 2018-02-23

## 2018-02-23 RX ORDER — PREDNISONE 10 MG/1
10 TABLET ORAL DAILY
Qty: 10 TABLET | Refills: 0 | Status: SHIPPED | OUTPATIENT
Start: 2018-02-23 | End: 2018-02-23

## 2018-02-23 RX ORDER — ALPRAZOLAM 0.5 MG/1
0.5 TABLET ORAL NIGHTLY PRN
Qty: 30 TABLET | Refills: 1 | Status: SHIPPED | OUTPATIENT
Start: 2018-02-23 | End: 2018-03-25

## 2018-02-23 RX ORDER — PREDNISONE 20 MG/1
TABLET ORAL
Qty: 21 TABLET | Refills: 0 | Status: SHIPPED | OUTPATIENT
Start: 2018-02-23 | End: 2018-03-05

## 2018-02-23 RX ORDER — PANTOPRAZOLE SODIUM 40 MG/1
40 TABLET, DELAYED RELEASE ORAL
Qty: 30 TABLET | Refills: 0 | Status: SHIPPED | OUTPATIENT
Start: 2018-02-24 | End: 2018-02-23

## 2018-02-23 RX ORDER — PREDNISONE 10 MG/1
10 TABLET ORAL DAILY
Qty: 10 TABLET | Refills: 0 | Status: SHIPPED | OUTPATIENT
Start: 2018-02-23 | End: 2018-03-05

## 2018-02-23 RX ORDER — SODIUM CHLORIDE 1000 MG
2 TABLET, SOLUBLE MISCELLANEOUS 2 TIMES DAILY
Qty: 20 TABLET | Refills: 0 | Status: SHIPPED | OUTPATIENT
Start: 2018-02-23 | End: 2018-08-08

## 2018-02-23 RX ORDER — SODIUM CHLORIDE 1000 MG
2 TABLET, SOLUBLE MISCELLANEOUS 2 TIMES DAILY
Qty: 20 TABLET | Refills: 0 | Status: SHIPPED | OUTPATIENT
Start: 2018-02-23 | End: 2018-02-23

## 2018-02-23 RX ORDER — ALPRAZOLAM 0.5 MG/1
0.5 TABLET ORAL NIGHTLY PRN
Qty: 30 TABLET | Refills: 0 | Status: SHIPPED | OUTPATIENT
Start: 2018-02-23 | End: 2018-02-23

## 2018-02-23 RX ORDER — PANTOPRAZOLE SODIUM 40 MG/1
40 TABLET, DELAYED RELEASE ORAL
Qty: 30 TABLET | Refills: 1 | Status: SHIPPED | OUTPATIENT
Start: 2018-02-24 | End: 2018-08-08

## 2018-02-23 RX ADMIN — Medication 10 ML: at 09:43

## 2018-02-23 RX ADMIN — CARBAMIDE PEROXIDE 6.5% 5 DROP: 6.5 LIQUID AURICULAR (OTIC) at 09:42

## 2018-02-23 RX ADMIN — SODIUM CHLORIDE TAB 1 GM 2 G: 1 TAB at 09:42

## 2018-02-23 RX ADMIN — PREDNISONE 60 MG: 50 TABLET ORAL at 09:42

## 2018-02-23 RX ADMIN — PANTOPRAZOLE SODIUM 40 MG: 40 TABLET, DELAYED RELEASE ORAL at 06:27

## 2018-02-23 ASSESSMENT — PAIN SCALES - GENERAL: PAINLEVEL_OUTOF10: 0

## 2018-02-23 NOTE — DISCHARGE SUMMARY
[facility]    Discharge Summary     Patient ID: Debby Ruelas  :  1934   MRN: [id number]     ACCOUNT:  [de-identified]   Patient's PCP: Neno Simons MD  Admit Date: 2018   Discharge Date: 2018  Length of Stay: 3  Code Status:  Full Code  Admitting Physician: Josse Guerra MD  Discharge Physician: Harrison Schaeffer MD     Active Discharge Diagnoses:     Primary Problem  [address]  Problems    Diagnosis Date Noted    Hyponatremia [E87.1] 2018     Priority: High    Hypothyroidism due to Hashimoto's thyroiditis [E03.8, E06.3] 2018    Sialadenitis [K11.20] 2018    Autoimmune thyroiditis [E06.3] 2018    Infiltrating ductal carcinoma of right breast ([address] [place] 2018       Admission Condition:  good     Discharged Condition: good    Hospital Stay:     Hospital Course:  79 y/o untreated invasive ductal Ca. of Right breast(takign naturotherapy), [facility] was undergoing w/u for Lupus with Dr Ron York outpatient with polyarthritis and was taking prednisone 20mg daily. She presented with worsening jaw pain and was found to have Submandibular gland adenitis and edema within rachel and hypopharynx. She was noted to have dsDNA+, with thyroid peroxidase and antithyroglobulin Abs.   - Initially was on empiric abx which were discontinued and she was on Steroids to which she responded very well. - Noted to be hyponatremic for which nephrology was consulted and recommended fluid restriction and salt tabs, to help ADH excess sec to pain that they felt was causing her low Na.  - On the day of discharge, Patient's Na  139. Pt continued to have some Rt sided lower jaw pain along with pain over left side of tongue, although much improved. O/E: A,Ox3, not in any distress, neck Supple with no palpable glands/LN, resolved induration. Normal heart and lung sounds.  Abd:Soft, NT, BS+. No pedal edema/calf tenderness. - She was felt to be stable to be released home with taper steroids and salt tabs. Rpt lab Brakpan x2 with results to be sent to Dr Joi Carrero. She was advised to f/u with Dr Chelita Ram before the steroid taper was complete. Daughter stated understanding of instructions for meds, labs and f/u. Significant Diagnostic Studies:   Radiology:    Xr Mandible (min 4 Views)    Result Date: 2/20/2018  EXAMINATION: 4 VIEWS OF THE MANDIBLE 2/20/2018 12:10 am COMPARISON: None. HISTORY: ORDERING SYSTEM PROVIDED HISTORY: Bilateral lower jaw pain TECHNOLOGIST PROVIDED HISTORY: Reason for exam:->Bilateral lower jaw pain Ordering Physician Provided Reason for Exam: C/o bilat jaw pain since this afternoon. States no known injury. Acuity: Acute Type of Exam: Initial Relevant Medical/Surgical History: Pt had difficulty maintaining positions due pt pain FINDINGS: Temporomandibular joints without evidence of dislocation. Bones appear demineralized. No focal osseous lesion or evidence of acute osseous fracture. No air-fluid levels within maxillary sinuses. Paranasal sinuses appear well aerated. Impacted right mandibular molar. Cervical spondylosis present. No radiographic evidence of acute osseous abnormality. Consider correlation with CT face if localizing symptoms are present. Xr Chest Standard (2 Vw)    Result Date: 2/19/2018  EXAMINATION: TWO VIEWS OF THE CHEST 2/19/2018 11:00 pm COMPARISON: None. HISTORY: ORDERING SYSTEM PROVIDED HISTORY: Chest pain TECHNOLOGIST PROVIDED HISTORY: Reason for exam:->Chest pain Ordering Physician Provided Reason for Exam: C/o bilateral jaw pain, that started this afternoon, constant, denies falls or trauma to the face. Pt denies arm pain, CP or SOB, denies cardiac history. Acuity: Acute Type of Exam: Initial Relevant Medical/Surgical History: Hx breast ca FINDINGS: Cardiomediastinal contour is within normal limits. No focal consolidation.  No opportunity to be involved in this patient's care.

## 2018-03-13 ENCOUNTER — HOSPITAL ENCOUNTER (OUTPATIENT)
Facility: MEDICAL CENTER | Age: 83
End: 2018-03-13
Payer: MEDICARE

## 2018-03-13 ENCOUNTER — HOSPITAL ENCOUNTER (OUTPATIENT)
Dept: INFUSION THERAPY | Facility: MEDICAL CENTER | Age: 83
Discharge: HOME | End: 2018-03-13
Payer: MEDICARE

## 2018-03-13 VITALS
DIASTOLIC BLOOD PRESSURE: 60 MMHG | SYSTOLIC BLOOD PRESSURE: 112 MMHG | TEMPERATURE: 98.4 F | RESPIRATION RATE: 20 BRPM | HEART RATE: 40 BPM

## 2018-03-13 DIAGNOSIS — D89.9 DISORDER INVOLVING IMMUNE MECHANISM (HCC): ICD-10-CM

## 2018-03-13 DIAGNOSIS — C50.411 MALIGNANT NEOPLASM OF UPPER-OUTER QUADRANT OF RIGHT FEMALE BREAST, UNSPECIFIED ESTROGEN RECEPTOR STATUS (HCC): ICD-10-CM

## 2018-03-13 PROCEDURE — 96366 THER/PROPH/DIAG IV INF ADDON: CPT

## 2018-03-13 PROCEDURE — 2580000003 HC RX 258: Performed by: INTERNAL MEDICINE

## 2018-03-13 PROCEDURE — 96365 THER/PROPH/DIAG IV INF INIT: CPT

## 2018-03-13 PROCEDURE — 2500000003 HC RX 250 WO HCPCS: Performed by: INTERNAL MEDICINE

## 2018-03-13 RX ORDER — SODIUM CHLORIDE 9 MG/ML
INJECTION, SOLUTION INTRAVENOUS ONCE
Status: COMPLETED | OUTPATIENT
Start: 2018-03-13 | End: 2018-03-13

## 2018-03-13 RX ORDER — PREDNISONE 10 MG/1
5 TABLET ORAL DAILY
COMMUNITY

## 2018-03-13 RX ORDER — SODIUM CHLORIDE 0.9 % (FLUSH) 0.9 %
10 SYRINGE (ML) INJECTION PRN
Status: CANCELLED | OUTPATIENT
Start: 2018-03-13

## 2018-03-13 RX ORDER — SODIUM CHLORIDE 9 MG/ML
INJECTION, SOLUTION INTRAVENOUS ONCE
Status: CANCELLED | OUTPATIENT
Start: 2018-03-13

## 2018-03-13 RX ADMIN — MAGNESIUM CHLORIDE: 200 INJECTION INTRAVENOUS at 11:45

## 2018-03-13 RX ADMIN — SODIUM CHLORIDE: 9 INJECTION, SOLUTION INTRAVENOUS at 11:45

## 2018-03-13 NOTE — PROGRESS NOTES
Patient arrives to the clinic for an infusion of ascorbic acid. Patient states that she has had a recent hospitalization due to low sodium level. IV started into the left ante cube with # 22 protective. Good blood return obtained. Ascorbic acid solution with magnesium and potassium infused over 2 hrs. Patient tolerated. IV discontinued and patient leaves in stable condition with family member.

## 2018-03-20 ENCOUNTER — HOSPITAL ENCOUNTER (OUTPATIENT)
Dept: INFUSION THERAPY | Facility: MEDICAL CENTER | Age: 83
Discharge: HOME | End: 2018-03-20
Payer: MEDICARE

## 2018-03-20 VITALS
HEART RATE: 76 BPM | DIASTOLIC BLOOD PRESSURE: 77 MMHG | RESPIRATION RATE: 20 BRPM | SYSTOLIC BLOOD PRESSURE: 140 MMHG | TEMPERATURE: 97.5 F

## 2018-03-20 DIAGNOSIS — C50.411 MALIGNANT NEOPLASM OF UPPER-OUTER QUADRANT OF RIGHT FEMALE BREAST, UNSPECIFIED ESTROGEN RECEPTOR STATUS (HCC): ICD-10-CM

## 2018-03-20 DIAGNOSIS — D89.9 DISORDER INVOLVING IMMUNE MECHANISM (HCC): ICD-10-CM

## 2018-03-20 PROCEDURE — 96365 THER/PROPH/DIAG IV INF INIT: CPT

## 2018-03-20 PROCEDURE — 2500000003 HC RX 250 WO HCPCS: Performed by: INTERNAL MEDICINE

## 2018-03-20 PROCEDURE — 96366 THER/PROPH/DIAG IV INF ADDON: CPT

## 2018-03-20 PROCEDURE — 2580000003 HC RX 258: Performed by: INTERNAL MEDICINE

## 2018-03-20 RX ORDER — SODIUM CHLORIDE 9 MG/ML
INJECTION, SOLUTION INTRAVENOUS ONCE
Status: CANCELLED | OUTPATIENT
Start: 2018-03-20

## 2018-03-20 RX ORDER — SODIUM CHLORIDE 9 MG/ML
INJECTION, SOLUTION INTRAVENOUS ONCE
Status: COMPLETED | OUTPATIENT
Start: 2018-03-20 | End: 2018-03-20

## 2018-03-20 RX ORDER — SODIUM CHLORIDE 0.9 % (FLUSH) 0.9 %
10 SYRINGE (ML) INJECTION PRN
Status: CANCELLED | OUTPATIENT
Start: 2018-03-20

## 2018-03-20 RX ADMIN — SODIUM CHLORIDE: 9 INJECTION, SOLUTION INTRAVENOUS at 10:30

## 2018-03-20 RX ADMIN — MAGNESIUM CHLORIDE: 200 INJECTION INTRAVENOUS at 10:34

## 2018-03-20 NOTE — PROGRESS NOTES
Patient here for infusion. Feels well today. Vitals obtained. IV started. Medication hung per MAR. Infusing. No complaints. Sleeping on and off. Completed. Tolerated well. IV discontinued intact, dressing to site. Has a return visit scheduled. Discharged ambulatory with her daughter.

## 2018-03-21 NOTE — FLOWSHEET NOTE
03/20/18 0830   Encounter Summary   Services provided to: Patient and family together   Referral/Consult From: Rounding   Continue Visiting (03/20/18)   Complexity of Encounter Low   Length of Encounter 15 minutes   Routine   Type Follow up   Spiritual/Mu-ism   Type Spiritual support    rounding; patient and family together.  offered support and presence. Chaplains will remain available to offer spiritual and emotional support as needed.

## 2018-03-27 ENCOUNTER — HOSPITAL ENCOUNTER (OUTPATIENT)
Dept: INFUSION THERAPY | Facility: MEDICAL CENTER | Age: 83
Discharge: HOME | End: 2018-03-27
Payer: MEDICARE

## 2018-03-27 VITALS
TEMPERATURE: 97.9 F | SYSTOLIC BLOOD PRESSURE: 113 MMHG | HEART RATE: 65 BPM | RESPIRATION RATE: 20 BRPM | DIASTOLIC BLOOD PRESSURE: 55 MMHG

## 2018-03-27 DIAGNOSIS — C50.411 MALIGNANT NEOPLASM OF UPPER-OUTER QUADRANT OF RIGHT FEMALE BREAST, UNSPECIFIED ESTROGEN RECEPTOR STATUS (HCC): ICD-10-CM

## 2018-03-27 DIAGNOSIS — D89.9 DISORDER INVOLVING IMMUNE MECHANISM (HCC): ICD-10-CM

## 2018-03-27 PROCEDURE — 2580000003 HC RX 258: Performed by: INTERNAL MEDICINE

## 2018-03-27 PROCEDURE — 96366 THER/PROPH/DIAG IV INF ADDON: CPT

## 2018-03-27 PROCEDURE — 96365 THER/PROPH/DIAG IV INF INIT: CPT

## 2018-03-27 PROCEDURE — 2500000003 HC RX 250 WO HCPCS: Performed by: INTERNAL MEDICINE

## 2018-03-27 RX ORDER — SODIUM CHLORIDE 0.9 % (FLUSH) 0.9 %
10 SYRINGE (ML) INJECTION PRN
Status: CANCELLED | OUTPATIENT
Start: 2018-03-27

## 2018-03-27 RX ORDER — SODIUM CHLORIDE 9 MG/ML
INJECTION, SOLUTION INTRAVENOUS ONCE
Status: COMPLETED | OUTPATIENT
Start: 2018-03-27 | End: 2018-03-27

## 2018-03-27 RX ORDER — SODIUM CHLORIDE 9 MG/ML
INJECTION, SOLUTION INTRAVENOUS ONCE
Status: CANCELLED | OUTPATIENT
Start: 2018-03-27

## 2018-03-27 RX ADMIN — MAGNESIUM CHLORIDE: 200 INJECTION INTRAVENOUS at 10:17

## 2018-03-27 RX ADMIN — SODIUM CHLORIDE: 9 INJECTION, SOLUTION INTRAVENOUS at 10:14

## 2018-03-27 NOTE — PROGRESS NOTES
Pt here for ascorbic acid with 70 grams of Magnesium, pt voices no complaints. Started IV without difficulty, checked dosages with 2 nd RN, Carole Franks. Pt ate lunch ran at 350 ml/hr per pt's request.  She completed without difficulty. Pt back in 4 weeks for next treatment.

## 2018-04-06 ENCOUNTER — HOSPITAL ENCOUNTER (OUTPATIENT)
Dept: INFUSION THERAPY | Facility: MEDICAL CENTER | Age: 83
Discharge: HOME | End: 2018-04-06
Payer: MEDICARE

## 2018-04-06 VITALS
RESPIRATION RATE: 20 BRPM | SYSTOLIC BLOOD PRESSURE: 129 MMHG | DIASTOLIC BLOOD PRESSURE: 66 MMHG | TEMPERATURE: 97.8 F | HEART RATE: 72 BPM

## 2018-04-06 DIAGNOSIS — C50.411 MALIGNANT NEOPLASM OF UPPER-OUTER QUADRANT OF RIGHT FEMALE BREAST, UNSPECIFIED ESTROGEN RECEPTOR STATUS (HCC): ICD-10-CM

## 2018-04-06 DIAGNOSIS — D89.9 DISORDER INVOLVING IMMUNE MECHANISM (HCC): ICD-10-CM

## 2018-04-06 PROCEDURE — 2580000003 HC RX 258: Performed by: INTERNAL MEDICINE

## 2018-04-06 PROCEDURE — 2500000003 HC RX 250 WO HCPCS: Performed by: INTERNAL MEDICINE

## 2018-04-06 PROCEDURE — 96366 THER/PROPH/DIAG IV INF ADDON: CPT

## 2018-04-06 PROCEDURE — 96365 THER/PROPH/DIAG IV INF INIT: CPT

## 2018-04-06 RX ORDER — SODIUM CHLORIDE 9 MG/ML
INJECTION, SOLUTION INTRAVENOUS ONCE
Status: CANCELLED | OUTPATIENT
Start: 2018-04-06

## 2018-04-06 RX ORDER — SODIUM CHLORIDE 9 MG/ML
INJECTION, SOLUTION INTRAVENOUS ONCE
Status: COMPLETED | OUTPATIENT
Start: 2018-04-06 | End: 2018-04-06

## 2018-04-06 RX ORDER — SODIUM CHLORIDE 0.9 % (FLUSH) 0.9 %
10 SYRINGE (ML) INJECTION PRN
Status: CANCELLED | OUTPATIENT
Start: 2018-04-06

## 2018-04-06 RX ADMIN — MAGNESIUM CHLORIDE: 200 INJECTION INTRAVENOUS at 12:02

## 2018-04-06 RX ADMIN — SODIUM CHLORIDE: 9 INJECTION, SOLUTION INTRAVENOUS at 11:50

## 2018-04-06 NOTE — PROGRESS NOTES
Patient here for Vitamin C infusion. Feels well today. Vitals obtained. IV started. Vitamin C hung per MAR. Infusing. No complaints. Completed. Tolerated well. IV discontinued intact, dressing to site. Has a return visit scheduled. Discharged ambulatory per self.

## 2018-04-10 ENCOUNTER — HOSPITAL ENCOUNTER (OUTPATIENT)
Dept: INFUSION THERAPY | Facility: MEDICAL CENTER | Age: 83
Discharge: HOME | End: 2018-04-10
Payer: MEDICARE

## 2018-04-10 VITALS
SYSTOLIC BLOOD PRESSURE: 117 MMHG | HEART RATE: 66 BPM | RESPIRATION RATE: 18 BRPM | DIASTOLIC BLOOD PRESSURE: 63 MMHG | TEMPERATURE: 98.1 F

## 2018-04-10 DIAGNOSIS — D89.9 DISORDER INVOLVING IMMUNE MECHANISM (HCC): ICD-10-CM

## 2018-04-10 DIAGNOSIS — C50.411 MALIGNANT NEOPLASM OF UPPER-OUTER QUADRANT OF RIGHT FEMALE BREAST, UNSPECIFIED ESTROGEN RECEPTOR STATUS (HCC): ICD-10-CM

## 2018-04-10 PROCEDURE — 96365 THER/PROPH/DIAG IV INF INIT: CPT

## 2018-04-10 PROCEDURE — 2500000003 HC RX 250 WO HCPCS: Performed by: INTERNAL MEDICINE

## 2018-04-10 PROCEDURE — 96366 THER/PROPH/DIAG IV INF ADDON: CPT

## 2018-04-10 RX ORDER — SODIUM CHLORIDE 9 MG/ML
INJECTION, SOLUTION INTRAVENOUS ONCE
Status: DISCONTINUED | OUTPATIENT
Start: 2018-04-10 | End: 2018-04-11 | Stop reason: HOSPADM

## 2018-04-10 RX ORDER — SODIUM CHLORIDE 9 MG/ML
INJECTION, SOLUTION INTRAVENOUS ONCE
Status: CANCELLED | OUTPATIENT
Start: 2018-04-10

## 2018-04-10 RX ORDER — SODIUM CHLORIDE 0.9 % (FLUSH) 0.9 %
10 SYRINGE (ML) INJECTION PRN
Status: CANCELLED | OUTPATIENT
Start: 2018-04-10

## 2018-04-10 RX ADMIN — MAGNESIUM CHLORIDE: 200 INJECTION INTRAVENOUS at 10:23

## 2018-04-10 NOTE — PROGRESS NOTES
Pt arrives per ambulatory with daughter in law. Pt insists that IV must be placed to lt antecub. Will not consider any other site. Placed and pt notified will need to keep arm very still due to IV is up against a valve and cathlon only 1/2 in, so unable to reposition, or writer can restart IV to another site. Pt moving arm frequently and after about 15 min, states will allow to be moved. IV restart per [facility] RN to lt forearm. All mag and vitamin C and sterile water infused over 2 1/2 hours at 338 ml/hr at pt's request. Pt tolerated well and then pt has next appts. Pt discharged per ambulatory with visitor and blood return present throughout infusion and no reactions or complaints.

## 2018-04-19 ENCOUNTER — HOSPITAL ENCOUNTER (OUTPATIENT)
Dept: INFUSION THERAPY | Facility: MEDICAL CENTER | Age: 83
Discharge: HOME | End: 2018-04-19
Payer: MEDICARE

## 2018-04-19 VITALS
DIASTOLIC BLOOD PRESSURE: 74 MMHG | TEMPERATURE: 97.9 F | RESPIRATION RATE: 16 BRPM | SYSTOLIC BLOOD PRESSURE: 122 MMHG | HEART RATE: 59 BPM

## 2018-04-19 DIAGNOSIS — D89.9 DISORDER INVOLVING IMMUNE MECHANISM (HCC): ICD-10-CM

## 2018-04-19 DIAGNOSIS — C50.411 MALIGNANT NEOPLASM OF UPPER-OUTER QUADRANT OF RIGHT FEMALE BREAST, UNSPECIFIED ESTROGEN RECEPTOR STATUS (HCC): ICD-10-CM

## 2018-04-19 PROCEDURE — 96365 THER/PROPH/DIAG IV INF INIT: CPT

## 2018-04-19 PROCEDURE — 96366 THER/PROPH/DIAG IV INF ADDON: CPT

## 2018-04-19 PROCEDURE — 2580000003 HC RX 258: Performed by: INTERNAL MEDICINE

## 2018-04-19 PROCEDURE — 2500000003 HC RX 250 WO HCPCS: Performed by: INTERNAL MEDICINE

## 2018-04-19 RX ORDER — SODIUM CHLORIDE 0.9 % (FLUSH) 0.9 %
10 SYRINGE (ML) INJECTION PRN
Status: CANCELLED | OUTPATIENT
Start: 2018-04-19

## 2018-04-19 RX ORDER — SODIUM CHLORIDE 9 MG/ML
INJECTION, SOLUTION INTRAVENOUS ONCE
Status: CANCELLED | OUTPATIENT
Start: 2018-04-19

## 2018-04-19 RX ORDER — SODIUM CHLORIDE 9 MG/ML
INJECTION, SOLUTION INTRAVENOUS ONCE
Status: COMPLETED | OUTPATIENT
Start: 2018-04-19 | End: 2018-04-19

## 2018-04-19 RX ADMIN — MAGNESIUM CHLORIDE: 200 INJECTION INTRAVENOUS at 10:13

## 2018-04-19 RX ADMIN — SODIUM CHLORIDE: 9 INJECTION, SOLUTION INTRAVENOUS at 10:13

## 2018-04-24 ENCOUNTER — HOSPITAL ENCOUNTER (OUTPATIENT)
Dept: INFUSION THERAPY | Facility: MEDICAL CENTER | Age: 83
Discharge: HOME | End: 2018-04-24
Payer: MEDICARE

## 2018-04-24 VITALS
DIASTOLIC BLOOD PRESSURE: 64 MMHG | TEMPERATURE: 97.6 F | SYSTOLIC BLOOD PRESSURE: 124 MMHG | RESPIRATION RATE: 20 BRPM | HEART RATE: 66 BPM

## 2018-04-24 DIAGNOSIS — D89.9 DISORDER INVOLVING IMMUNE MECHANISM (HCC): ICD-10-CM

## 2018-04-24 DIAGNOSIS — C50.411 MALIGNANT NEOPLASM OF UPPER-OUTER QUADRANT OF RIGHT FEMALE BREAST, UNSPECIFIED ESTROGEN RECEPTOR STATUS (HCC): ICD-10-CM

## 2018-04-24 PROCEDURE — 2500000003 HC RX 250 WO HCPCS: Performed by: INTERNAL MEDICINE

## 2018-04-24 PROCEDURE — 96365 THER/PROPH/DIAG IV INF INIT: CPT

## 2018-04-24 PROCEDURE — 96366 THER/PROPH/DIAG IV INF ADDON: CPT

## 2018-04-24 PROCEDURE — 2580000003 HC RX 258: Performed by: INTERNAL MEDICINE

## 2018-04-24 RX ORDER — SODIUM CHLORIDE 9 MG/ML
INJECTION, SOLUTION INTRAVENOUS ONCE
Status: CANCELLED | OUTPATIENT
Start: 2018-04-24

## 2018-04-24 RX ORDER — SODIUM CHLORIDE 0.9 % (FLUSH) 0.9 %
10 SYRINGE (ML) INJECTION PRN
Status: CANCELLED | OUTPATIENT
Start: 2018-04-24

## 2018-04-24 RX ORDER — SODIUM CHLORIDE 9 MG/ML
INJECTION, SOLUTION INTRAVENOUS ONCE
Status: COMPLETED | OUTPATIENT
Start: 2018-04-24 | End: 2018-04-24

## 2018-04-24 RX ADMIN — MAGNESIUM CHLORIDE: 200 INJECTION INTRAVENOUS at 10:35

## 2018-04-24 RX ADMIN — SODIUM CHLORIDE: 9 INJECTION, SOLUTION INTRAVENOUS at 10:15

## 2018-04-24 NOTE — PROGRESS NOTES
Patient here for IV infusion. Feels well today. Vitals obtained. IV started. IV hung per MAR. Infusing. No complaints. Completed. Tolerated well. IV discontinued intact, dressing to site. Has a return visit scheduled. Discharged ambulatory with her daughter.

## 2018-05-04 ENCOUNTER — HOSPITAL ENCOUNTER (OUTPATIENT)
Dept: INFUSION THERAPY | Facility: MEDICAL CENTER | Age: 83
Discharge: HOME | End: 2018-05-04
Payer: MEDICARE

## 2018-05-04 VITALS
HEART RATE: 64 BPM | RESPIRATION RATE: 18 BRPM | SYSTOLIC BLOOD PRESSURE: 123 MMHG | TEMPERATURE: 97.4 F | DIASTOLIC BLOOD PRESSURE: 66 MMHG

## 2018-05-04 DIAGNOSIS — C50.411 MALIGNANT NEOPLASM OF UPPER-OUTER QUADRANT OF RIGHT FEMALE BREAST, UNSPECIFIED ESTROGEN RECEPTOR STATUS (HCC): ICD-10-CM

## 2018-05-04 DIAGNOSIS — D89.9 DISORDER INVOLVING IMMUNE MECHANISM (HCC): ICD-10-CM

## 2018-05-04 PROCEDURE — 96366 THER/PROPH/DIAG IV INF ADDON: CPT

## 2018-05-04 PROCEDURE — 96365 THER/PROPH/DIAG IV INF INIT: CPT

## 2018-05-04 PROCEDURE — 2500000003 HC RX 250 WO HCPCS: Performed by: INTERNAL MEDICINE

## 2018-05-04 PROCEDURE — 2580000003 HC RX 258: Performed by: INTERNAL MEDICINE

## 2018-05-04 RX ORDER — SODIUM CHLORIDE 9 MG/ML
INJECTION, SOLUTION INTRAVENOUS ONCE
Status: CANCELLED | OUTPATIENT
Start: 2018-05-04

## 2018-05-04 RX ORDER — SODIUM CHLORIDE 9 MG/ML
INJECTION, SOLUTION INTRAVENOUS ONCE
Status: COMPLETED | OUTPATIENT
Start: 2018-05-04 | End: 2018-05-04

## 2018-05-04 RX ORDER — SODIUM CHLORIDE 0.9 % (FLUSH) 0.9 %
10 SYRINGE (ML) INJECTION PRN
Status: CANCELLED | OUTPATIENT
Start: 2018-05-04

## 2018-05-04 RX ADMIN — MAGNESIUM CHLORIDE: 200 INJECTION INTRAVENOUS at 10:53

## 2018-05-04 RX ADMIN — SODIUM CHLORIDE: 9 INJECTION, SOLUTION INTRAVENOUS at 10:21

## 2018-05-04 ASSESSMENT — PAIN SCALES - GENERAL: PAINLEVEL_OUTOF10: 0

## 2018-05-11 ENCOUNTER — HOSPITAL ENCOUNTER (OUTPATIENT)
Dept: INFUSION THERAPY | Facility: MEDICAL CENTER | Age: 83
Discharge: HOME | End: 2018-05-11
Payer: MEDICARE

## 2018-05-11 VITALS
TEMPERATURE: 97.8 F | DIASTOLIC BLOOD PRESSURE: 67 MMHG | RESPIRATION RATE: 20 BRPM | SYSTOLIC BLOOD PRESSURE: 123 MMHG | HEART RATE: 67 BPM

## 2018-05-11 DIAGNOSIS — C50.411 MALIGNANT NEOPLASM OF UPPER-OUTER QUADRANT OF RIGHT FEMALE BREAST, UNSPECIFIED ESTROGEN RECEPTOR STATUS (HCC): ICD-10-CM

## 2018-05-11 DIAGNOSIS — D89.9 DISORDER INVOLVING IMMUNE MECHANISM (HCC): ICD-10-CM

## 2018-05-11 PROCEDURE — 2500000003 HC RX 250 WO HCPCS: Performed by: INTERNAL MEDICINE

## 2018-05-11 PROCEDURE — 96365 THER/PROPH/DIAG IV INF INIT: CPT

## 2018-05-11 PROCEDURE — 96366 THER/PROPH/DIAG IV INF ADDON: CPT

## 2018-05-11 RX ORDER — SODIUM CHLORIDE 0.9 % (FLUSH) 0.9 %
10 SYRINGE (ML) INJECTION PRN
Status: CANCELLED | OUTPATIENT
Start: 2018-05-11

## 2018-05-11 RX ORDER — SODIUM CHLORIDE 9 MG/ML
INJECTION, SOLUTION INTRAVENOUS ONCE
Status: CANCELLED | OUTPATIENT
Start: 2018-05-11

## 2018-05-11 RX ORDER — SODIUM CHLORIDE 9 MG/ML
INJECTION, SOLUTION INTRAVENOUS ONCE
Status: DISCONTINUED | OUTPATIENT
Start: 2018-05-11 | End: 2018-05-12 | Stop reason: HOSPADM

## 2018-05-11 RX ADMIN — MAGNESIUM CHLORIDE: 200 INJECTION INTRAVENOUS at 10:10

## 2018-05-11 NOTE — PROGRESS NOTES
Patient here for infusion. Feels well today. Having back problems. Vitals obtained. IV started. Drug hung per MAR. Infusing. No complaints. Completed. Tolerated well. IV discontinued intact, dressing to site. Has a return visit scheduled. Discharged ambulatory with daughter.

## 2018-05-18 ENCOUNTER — HOSPITAL ENCOUNTER (OUTPATIENT)
Dept: INFUSION THERAPY | Facility: MEDICAL CENTER | Age: 83
Discharge: HOME | End: 2018-05-18
Payer: MEDICARE

## 2018-05-18 VITALS
HEART RATE: 62 BPM | RESPIRATION RATE: 18 BRPM | DIASTOLIC BLOOD PRESSURE: 69 MMHG | TEMPERATURE: 97.6 F | SYSTOLIC BLOOD PRESSURE: 142 MMHG

## 2018-05-18 DIAGNOSIS — C50.411 MALIGNANT NEOPLASM OF UPPER-OUTER QUADRANT OF RIGHT FEMALE BREAST, UNSPECIFIED ESTROGEN RECEPTOR STATUS (HCC): ICD-10-CM

## 2018-05-18 DIAGNOSIS — D89.9 DISORDER INVOLVING IMMUNE MECHANISM (HCC): ICD-10-CM

## 2018-05-18 PROCEDURE — 2500000003 HC RX 250 WO HCPCS: Performed by: INTERNAL MEDICINE

## 2018-05-18 PROCEDURE — 96366 THER/PROPH/DIAG IV INF ADDON: CPT

## 2018-05-18 PROCEDURE — 96365 THER/PROPH/DIAG IV INF INIT: CPT

## 2018-05-18 PROCEDURE — 2580000003 HC RX 258: Performed by: INTERNAL MEDICINE

## 2018-05-18 RX ORDER — SODIUM CHLORIDE 9 MG/ML
INJECTION, SOLUTION INTRAVENOUS ONCE
Status: CANCELLED | OUTPATIENT
Start: 2018-05-18

## 2018-05-18 RX ORDER — SODIUM CHLORIDE 9 MG/ML
INJECTION, SOLUTION INTRAVENOUS ONCE
Status: COMPLETED | OUTPATIENT
Start: 2018-05-18 | End: 2018-05-18

## 2018-05-18 RX ORDER — SODIUM CHLORIDE 0.9 % (FLUSH) 0.9 %
10 SYRINGE (ML) INJECTION PRN
Status: CANCELLED | OUTPATIENT
Start: 2018-05-18

## 2018-05-18 RX ADMIN — MAGNESIUM CHLORIDE: 200 INJECTION INTRAVENOUS at 10:40

## 2018-05-18 RX ADMIN — SODIUM CHLORIDE: 9 INJECTION, SOLUTION INTRAVENOUS at 10:20

## 2018-05-18 NOTE — PROGRESS NOTES
Patient arrives per ambulation with her daughter for infusion. Orders reviewed. Tolerated infusion well, no complaints voiced. Discharged per ambulation with her daughter. Appointment calendar given. RTC 5/24/18.

## 2018-05-24 ENCOUNTER — HOSPITAL ENCOUNTER (OUTPATIENT)
Dept: INFUSION THERAPY | Facility: MEDICAL CENTER | Age: 83
Discharge: HOME | End: 2018-05-24
Payer: MEDICARE

## 2018-05-24 VITALS
TEMPERATURE: 97.6 F | RESPIRATION RATE: 18 BRPM | HEART RATE: 66 BPM | DIASTOLIC BLOOD PRESSURE: 72 MMHG | SYSTOLIC BLOOD PRESSURE: 100 MMHG

## 2018-05-24 DIAGNOSIS — C50.411 MALIGNANT NEOPLASM OF UPPER-OUTER QUADRANT OF RIGHT FEMALE BREAST, UNSPECIFIED ESTROGEN RECEPTOR STATUS (HCC): ICD-10-CM

## 2018-05-24 DIAGNOSIS — D89.9 DISORDER INVOLVING IMMUNE MECHANISM (HCC): ICD-10-CM

## 2018-05-24 PROCEDURE — 96366 THER/PROPH/DIAG IV INF ADDON: CPT

## 2018-05-24 PROCEDURE — 2500000003 HC RX 250 WO HCPCS: Performed by: INTERNAL MEDICINE

## 2018-05-24 PROCEDURE — 96365 THER/PROPH/DIAG IV INF INIT: CPT

## 2018-05-24 PROCEDURE — 2580000003 HC RX 258: Performed by: INTERNAL MEDICINE

## 2018-05-24 RX ORDER — SODIUM CHLORIDE 0.9 % (FLUSH) 0.9 %
10 SYRINGE (ML) INJECTION PRN
Status: CANCELLED | OUTPATIENT
Start: 2018-05-24

## 2018-05-24 RX ORDER — SODIUM CHLORIDE 9 MG/ML
INJECTION, SOLUTION INTRAVENOUS ONCE
Status: COMPLETED | OUTPATIENT
Start: 2018-05-24 | End: 2018-05-24

## 2018-05-24 RX ORDER — SODIUM CHLORIDE 9 MG/ML
INJECTION, SOLUTION INTRAVENOUS ONCE
Status: CANCELLED | OUTPATIENT
Start: 2018-05-24

## 2018-05-24 RX ADMIN — MAGNESIUM CHLORIDE: 200 INJECTION INTRAVENOUS at 10:59

## 2018-05-24 RX ADMIN — SODIUM CHLORIDE: 9 INJECTION, SOLUTION INTRAVENOUS at 10:59

## 2018-05-24 NOTE — PROGRESS NOTES
Pt arrives per ambulatory with visitor and orders reviewed and pt tolerated infusion well and no reactions or complaints. NS flushing line before and after MAG and VIT C in sterile water. Pt has next appt and pt discharged per ambulatory with visitor no further questions voiced.

## 2018-06-01 ENCOUNTER — HOSPITAL ENCOUNTER (OUTPATIENT)
Dept: INFUSION THERAPY | Facility: MEDICAL CENTER | Age: 83
Discharge: HOME | End: 2018-06-01
Payer: MEDICARE

## 2018-06-01 VITALS
TEMPERATURE: 97.9 F | HEART RATE: 68 BPM | RESPIRATION RATE: 18 BRPM | DIASTOLIC BLOOD PRESSURE: 69 MMHG | SYSTOLIC BLOOD PRESSURE: 125 MMHG

## 2018-06-01 DIAGNOSIS — D89.9 DISORDER INVOLVING IMMUNE MECHANISM (HCC): ICD-10-CM

## 2018-06-01 DIAGNOSIS — C50.411 MALIGNANT NEOPLASM OF UPPER-OUTER QUADRANT OF RIGHT FEMALE BREAST, UNSPECIFIED ESTROGEN RECEPTOR STATUS (HCC): ICD-10-CM

## 2018-06-01 PROCEDURE — 96365 THER/PROPH/DIAG IV INF INIT: CPT

## 2018-06-01 PROCEDURE — 96366 THER/PROPH/DIAG IV INF ADDON: CPT

## 2018-06-01 PROCEDURE — 2500000003 HC RX 250 WO HCPCS: Performed by: INTERNAL MEDICINE

## 2018-06-01 PROCEDURE — 2580000003 HC RX 258: Performed by: INTERNAL MEDICINE

## 2018-06-01 RX ORDER — SODIUM CHLORIDE 0.9 % (FLUSH) 0.9 %
10 SYRINGE (ML) INJECTION PRN
Status: CANCELLED | OUTPATIENT
Start: 2018-06-01

## 2018-06-01 RX ORDER — SODIUM CHLORIDE 9 MG/ML
INJECTION, SOLUTION INTRAVENOUS ONCE
Status: CANCELLED | OUTPATIENT
Start: 2018-06-01

## 2018-06-01 RX ORDER — SODIUM CHLORIDE 9 MG/ML
INJECTION, SOLUTION INTRAVENOUS ONCE
Status: COMPLETED | OUTPATIENT
Start: 2018-06-01 | End: 2018-06-01

## 2018-06-01 RX ADMIN — SODIUM CHLORIDE: 9 INJECTION, SOLUTION INTRAVENOUS at 10:15

## 2018-06-01 RX ADMIN — MAGNESIUM CHLORIDE: 200 INJECTION INTRAVENOUS at 10:55

## 2018-06-01 ASSESSMENT — PAIN SCALES - GENERAL: PAINLEVEL_OUTOF10: 0

## 2018-06-01 NOTE — PROGRESS NOTES
1000:  Pt arrives ambulatory accompanied by pt's granddaughter, Carlee Morales, for Vitamin C IV infusion. Orders reviewed. Pt tolerated Vitamin C IV infusion well without complaints. Appointment calendar given to patient. RTC on 6/8/18. Pt discharged home ambulatory accompanied by granddaughter without complaints and/or no concerns voiced.

## 2018-06-08 ENCOUNTER — HOSPITAL ENCOUNTER (OUTPATIENT)
Dept: INFUSION THERAPY | Facility: MEDICAL CENTER | Age: 83
Discharge: HOME | End: 2018-06-08
Payer: MEDICARE

## 2018-06-08 VITALS
DIASTOLIC BLOOD PRESSURE: 44 MMHG | TEMPERATURE: 97.7 F | HEART RATE: 73 BPM | RESPIRATION RATE: 16 BRPM | SYSTOLIC BLOOD PRESSURE: 130 MMHG

## 2018-06-08 DIAGNOSIS — D89.9 DISORDER INVOLVING IMMUNE MECHANISM (HCC): ICD-10-CM

## 2018-06-08 DIAGNOSIS — C50.411 MALIGNANT NEOPLASM OF UPPER-OUTER QUADRANT OF RIGHT FEMALE BREAST, UNSPECIFIED ESTROGEN RECEPTOR STATUS (HCC): ICD-10-CM

## 2018-06-08 PROCEDURE — 2580000003 HC RX 258: Performed by: INTERNAL MEDICINE

## 2018-06-08 PROCEDURE — 96365 THER/PROPH/DIAG IV INF INIT: CPT

## 2018-06-08 PROCEDURE — 96366 THER/PROPH/DIAG IV INF ADDON: CPT

## 2018-06-08 PROCEDURE — 2500000003 HC RX 250 WO HCPCS: Performed by: INTERNAL MEDICINE

## 2018-06-08 RX ORDER — SODIUM CHLORIDE 9 MG/ML
INJECTION, SOLUTION INTRAVENOUS ONCE
Status: COMPLETED | OUTPATIENT
Start: 2018-06-08 | End: 2018-06-08

## 2018-06-08 RX ORDER — SODIUM CHLORIDE 0.9 % (FLUSH) 0.9 %
10 SYRINGE (ML) INJECTION PRN
Status: CANCELLED | OUTPATIENT
Start: 2018-06-08

## 2018-06-08 RX ORDER — SODIUM CHLORIDE 9 MG/ML
INJECTION, SOLUTION INTRAVENOUS ONCE
Status: CANCELLED | OUTPATIENT
Start: 2018-06-08

## 2018-06-08 RX ADMIN — MAGNESIUM CHLORIDE: 200 INJECTION INTRAVENOUS at 11:22

## 2018-06-08 RX ADMIN — SODIUM CHLORIDE: 9 INJECTION, SOLUTION INTRAVENOUS at 11:22

## 2018-06-08 NOTE — PROGRESS NOTES
[facility] arrives ambulatory with granddaughter  Order for iv vitamin C reviewed  [facility] is walking slow and using a cane   Reports she has low back pain and lt groin pain   Started two days ago and has not seen physician for this yet  Iv started with #22 cathlon to lt antecubital area per policy   Good blood return noted  See [facility] for Vitamin C infusion times  Tolerated well  No reaction noted  Iv completed and iv discontinued with needle intact  Pressure dressing applied  Discharged per wheelchair with granddaughter to home

## 2018-06-11 ENCOUNTER — HOSPITAL ENCOUNTER (OUTPATIENT)
Dept: INFUSION THERAPY | Facility: MEDICAL CENTER | Age: 83
Discharge: HOME | End: 2018-06-11
Payer: MEDICARE

## 2018-06-11 VITALS
SYSTOLIC BLOOD PRESSURE: 122 MMHG | HEART RATE: 63 BPM | TEMPERATURE: 98.3 F | DIASTOLIC BLOOD PRESSURE: 69 MMHG | RESPIRATION RATE: 18 BRPM

## 2018-06-11 DIAGNOSIS — D89.9 DISORDER INVOLVING IMMUNE MECHANISM (HCC): ICD-10-CM

## 2018-06-11 DIAGNOSIS — C50.411 MALIGNANT NEOPLASM OF UPPER-OUTER QUADRANT OF RIGHT FEMALE BREAST, UNSPECIFIED ESTROGEN RECEPTOR STATUS (HCC): ICD-10-CM

## 2018-06-11 PROCEDURE — 96361 HYDRATE IV INFUSION ADD-ON: CPT

## 2018-06-11 PROCEDURE — 2580000003 HC RX 258: Performed by: INTERNAL MEDICINE

## 2018-06-11 PROCEDURE — 2500000003 HC RX 250 WO HCPCS: Performed by: INTERNAL MEDICINE

## 2018-06-11 PROCEDURE — 96360 HYDRATION IV INFUSION INIT: CPT

## 2018-06-11 RX ORDER — SODIUM CHLORIDE 9 MG/ML
INJECTION, SOLUTION INTRAVENOUS ONCE
Status: CANCELLED | OUTPATIENT
Start: 2018-06-11

## 2018-06-11 RX ORDER — SODIUM CHLORIDE 0.9 % (FLUSH) 0.9 %
10 SYRINGE (ML) INJECTION PRN
Status: CANCELLED | OUTPATIENT
Start: 2018-06-11

## 2018-06-11 RX ORDER — SODIUM CHLORIDE 9 MG/ML
INJECTION, SOLUTION INTRAVENOUS ONCE
Status: COMPLETED | OUTPATIENT
Start: 2018-06-11 | End: 2018-06-11

## 2018-06-11 RX ADMIN — SODIUM CHLORIDE: 9 INJECTION, SOLUTION INTRAVENOUS at 10:19

## 2018-06-11 RX ADMIN — MAGNESIUM CHLORIDE: 200 INJECTION INTRAVENOUS at 10:20

## 2018-06-11 ASSESSMENT — PAIN SCALES - GENERAL: PAINLEVEL_OUTOF10: 10

## 2018-06-11 ASSESSMENT — PAIN DESCRIPTION - PAIN TYPE: TYPE: CHRONIC PAIN

## 2018-06-11 ASSESSMENT — PAIN DESCRIPTION - LOCATION: LOCATION: BACK

## 2018-06-11 NOTE — FLOWSHEET NOTE
06/11/18 1544   Encounter Summary   Services provided to: Patient and family together   Referral/Consult From: Rounding   Continue Visiting (06/11/18)   Complexity of Encounter Low   Length of Encounter 15 minutes   Routine   Type Follow up   Assessment Approachable   Intervention Active listening;Explored feelings, thoughts, concerns;Explored coping resources;Nurtured hope   Outcome Engaged in conversation;Expressed feelings/needs/concerns;Coping   Spiritual/[employer]   Type Spiritual support

## 2018-06-22 ENCOUNTER — HOSPITAL ENCOUNTER (OUTPATIENT)
Dept: INFUSION THERAPY | Facility: MEDICAL CENTER | Age: 83
Discharge: HOME | End: 2018-06-22
Payer: MEDICARE

## 2018-06-22 ENCOUNTER — TELEPHONE (OUTPATIENT)
Dept: ONCOLOGY | Age: 83
End: 2018-06-22

## 2018-06-22 ENCOUNTER — HOSPITAL ENCOUNTER (OUTPATIENT)
Dept: NUCLEAR MEDICINE | Age: 83
Discharge: HOME | End: 2018-06-24
Payer: MEDICARE

## 2018-06-22 VITALS
SYSTOLIC BLOOD PRESSURE: 143 MMHG | HEART RATE: 56 BPM | RESPIRATION RATE: 18 BRPM | DIASTOLIC BLOOD PRESSURE: 77 MMHG | TEMPERATURE: 97.4 F

## 2018-06-22 DIAGNOSIS — C50.811 MALIGNANT NEOPLASM OF OVERLAPPING SITES OF RIGHT FEMALE BREAST, UNSPECIFIED ESTROGEN RECEPTOR STATUS (HCC): ICD-10-CM

## 2018-06-22 DIAGNOSIS — D89.9 DISORDER INVOLVING IMMUNE MECHANISM (HCC): ICD-10-CM

## 2018-06-22 DIAGNOSIS — C50.411 MALIGNANT NEOPLASM OF UPPER-OUTER QUADRANT OF RIGHT FEMALE BREAST, UNSPECIFIED ESTROGEN RECEPTOR STATUS (HCC): ICD-10-CM

## 2018-06-22 PROCEDURE — A9552 F18 FDG: HCPCS | Performed by: INTERNAL MEDICINE

## 2018-06-22 PROCEDURE — 78816 PET IMAGE W/CT FULL BODY: CPT

## 2018-06-22 PROCEDURE — 2580000003 HC RX 258: Performed by: INTERNAL MEDICINE

## 2018-06-22 PROCEDURE — 2500000003 HC RX 250 WO HCPCS: Performed by: INTERNAL MEDICINE

## 2018-06-22 PROCEDURE — 96366 THER/PROPH/DIAG IV INF ADDON: CPT

## 2018-06-22 PROCEDURE — 96365 THER/PROPH/DIAG IV INF INIT: CPT

## 2018-06-22 PROCEDURE — 3430000000 HC RX DIAGNOSTIC RADIOPHARMACEUTICAL: Performed by: INTERNAL MEDICINE

## 2018-06-22 RX ORDER — FLUDEOXYGLUCOSE F 18 200 MCI/ML
10 INJECTION, SOLUTION INTRAVENOUS
Status: COMPLETED | OUTPATIENT
Start: 2018-06-22 | End: 2018-06-22

## 2018-06-22 RX ORDER — SODIUM CHLORIDE 9 MG/ML
INJECTION, SOLUTION INTRAVENOUS ONCE
Status: COMPLETED | OUTPATIENT
Start: 2018-06-22 | End: 2018-06-22

## 2018-06-22 RX ORDER — SODIUM CHLORIDE 9 MG/ML
INJECTION, SOLUTION INTRAVENOUS ONCE
Status: CANCELLED | OUTPATIENT
Start: 2018-06-22

## 2018-06-22 RX ORDER — SODIUM CHLORIDE 0.9 % (FLUSH) 0.9 %
10 SYRINGE (ML) INJECTION PRN
Status: CANCELLED | OUTPATIENT
Start: 2018-06-22

## 2018-06-22 RX ADMIN — MAGNESIUM CHLORIDE: 200 INJECTION INTRAVENOUS at 13:32

## 2018-06-22 RX ADMIN — FLUDEOXYGLUCOSE F 18 11.4 MILLICURIE: 200 INJECTION, SOLUTION INTRAVENOUS at 11:09

## 2018-06-22 RX ADMIN — SODIUM CHLORIDE: 9 INJECTION, SOLUTION INTRAVENOUS at 12:50

## 2018-06-29 ENCOUNTER — HOSPITAL ENCOUNTER (OUTPATIENT)
Dept: INFUSION THERAPY | Facility: MEDICAL CENTER | Age: 83
Discharge: HOME | End: 2018-06-29
Payer: MEDICARE

## 2018-06-29 VITALS
SYSTOLIC BLOOD PRESSURE: 122 MMHG | HEART RATE: 61 BPM | DIASTOLIC BLOOD PRESSURE: 65 MMHG | RESPIRATION RATE: 18 BRPM | TEMPERATURE: 97.6 F

## 2018-06-29 DIAGNOSIS — D89.9 DISORDER INVOLVING IMMUNE MECHANISM (HCC): ICD-10-CM

## 2018-06-29 DIAGNOSIS — C50.411 MALIGNANT NEOPLASM OF UPPER-OUTER QUADRANT OF RIGHT FEMALE BREAST, UNSPECIFIED ESTROGEN RECEPTOR STATUS (HCC): ICD-10-CM

## 2018-06-29 PROCEDURE — 96365 THER/PROPH/DIAG IV INF INIT: CPT

## 2018-06-29 PROCEDURE — 2580000003 HC RX 258: Performed by: INTERNAL MEDICINE

## 2018-06-29 PROCEDURE — 96366 THER/PROPH/DIAG IV INF ADDON: CPT

## 2018-06-29 PROCEDURE — 2500000003 HC RX 250 WO HCPCS: Performed by: INTERNAL MEDICINE

## 2018-06-29 RX ORDER — SODIUM CHLORIDE 0.9 % (FLUSH) 0.9 %
10 SYRINGE (ML) INJECTION PRN
Status: CANCELLED | OUTPATIENT
Start: 2018-06-29

## 2018-06-29 RX ORDER — SODIUM CHLORIDE 9 MG/ML
INJECTION, SOLUTION INTRAVENOUS ONCE
Status: COMPLETED | OUTPATIENT
Start: 2018-06-29 | End: 2018-06-29

## 2018-06-29 RX ORDER — SODIUM CHLORIDE 9 MG/ML
INJECTION, SOLUTION INTRAVENOUS ONCE
Status: CANCELLED | OUTPATIENT
Start: 2018-06-29

## 2018-06-29 RX ADMIN — MAGNESIUM CHLORIDE: 200 INJECTION INTRAVENOUS at 11:00

## 2018-06-29 RX ADMIN — SODIUM CHLORIDE: 9 INJECTION, SOLUTION INTRAVENOUS at 10:45

## 2018-06-29 ASSESSMENT — PAIN SCALES - GENERAL: PAINLEVEL_OUTOF10: 0

## 2018-06-29 NOTE — PROGRESS NOTES
1030:   Pt arrives per wheelchair transport accompanied by pt's daughter, Ana Gallardo, for her weekly IV Hydration with Ascorbic Acid and Magnesium Sulfate Infusion. Orders reviewed. 1045:  #24G PIV inserted in left antecubital site without difficulty on 1st attempt and pt tolerated well without complaints. 0.9% NaCl IVF's begun at 20ml/hr as med line to flush before, during and after IV Hydration with Ascorbic Acid 70gm and Magnesium Sulfate 800mg in 700ml sterile water infusing at 350ml/hr to infuse over 2-2.5 hours as per Dr. Phillip Barragan orders from 6/21/18. Pt tolerated IV Hydration with Ascorbic Acid and Magnesium Sulfate infusion well without complaints. No s/s adverse reactions noted. Appointment calendar given to patient. RTC on 7/6/18. Pt discharged home per wheelchair transport accompanied by pt's daughter without complaints and/or no new concerns voiced.

## 2018-07-06 ENCOUNTER — HOSPITAL ENCOUNTER (OUTPATIENT)
Dept: INFUSION THERAPY | Facility: MEDICAL CENTER | Age: 83
Discharge: HOME | End: 2018-07-06
Payer: MEDICARE

## 2018-07-06 VITALS
HEART RATE: 61 BPM | SYSTOLIC BLOOD PRESSURE: 119 MMHG | DIASTOLIC BLOOD PRESSURE: 62 MMHG | RESPIRATION RATE: 18 BRPM | TEMPERATURE: 97.6 F

## 2018-07-06 DIAGNOSIS — D89.9 DISORDER INVOLVING IMMUNE MECHANISM (HCC): ICD-10-CM

## 2018-07-06 DIAGNOSIS — C50.411 MALIGNANT NEOPLASM OF UPPER-OUTER QUADRANT OF RIGHT FEMALE BREAST, UNSPECIFIED ESTROGEN RECEPTOR STATUS (HCC): ICD-10-CM

## 2018-07-06 PROCEDURE — 96365 THER/PROPH/DIAG IV INF INIT: CPT

## 2018-07-06 PROCEDURE — 2580000003 HC RX 258: Performed by: INTERNAL MEDICINE

## 2018-07-06 PROCEDURE — 2500000003 HC RX 250 WO HCPCS: Performed by: INTERNAL MEDICINE

## 2018-07-06 PROCEDURE — 96366 THER/PROPH/DIAG IV INF ADDON: CPT

## 2018-07-06 RX ORDER — SODIUM CHLORIDE 9 MG/ML
INJECTION, SOLUTION INTRAVENOUS ONCE
Status: COMPLETED | OUTPATIENT
Start: 2018-07-06 | End: 2018-07-06

## 2018-07-06 RX ORDER — SODIUM CHLORIDE 9 MG/ML
INJECTION, SOLUTION INTRAVENOUS ONCE
Status: CANCELLED | OUTPATIENT
Start: 2018-07-06

## 2018-07-06 RX ORDER — SODIUM CHLORIDE 0.9 % (FLUSH) 0.9 %
10 SYRINGE (ML) INJECTION PRN
Status: CANCELLED | OUTPATIENT
Start: 2018-07-06

## 2018-07-06 RX ADMIN — MAGNESIUM CHLORIDE: 200 INJECTION INTRAVENOUS at 10:26

## 2018-07-06 RX ADMIN — SODIUM CHLORIDE: 9 INJECTION, SOLUTION INTRAVENOUS at 10:26

## 2018-07-13 ENCOUNTER — HOSPITAL ENCOUNTER (OUTPATIENT)
Dept: INFUSION THERAPY | Facility: MEDICAL CENTER | Age: 83
Discharge: HOME | End: 2018-07-13
Payer: MEDICARE

## 2018-07-13 VITALS
HEART RATE: 61 BPM | DIASTOLIC BLOOD PRESSURE: 55 MMHG | SYSTOLIC BLOOD PRESSURE: 105 MMHG | RESPIRATION RATE: 18 BRPM | TEMPERATURE: 97.7 F

## 2018-07-13 DIAGNOSIS — C50.411 MALIGNANT NEOPLASM OF UPPER-OUTER QUADRANT OF RIGHT FEMALE BREAST, UNSPECIFIED ESTROGEN RECEPTOR STATUS (HCC): ICD-10-CM

## 2018-07-13 DIAGNOSIS — D89.9 DISORDER INVOLVING IMMUNE MECHANISM (HCC): ICD-10-CM

## 2018-07-13 PROCEDURE — 2500000003 HC RX 250 WO HCPCS: Performed by: INTERNAL MEDICINE

## 2018-07-13 PROCEDURE — 96365 THER/PROPH/DIAG IV INF INIT: CPT

## 2018-07-13 PROCEDURE — 2580000003 HC RX 258: Performed by: INTERNAL MEDICINE

## 2018-07-13 PROCEDURE — 96366 THER/PROPH/DIAG IV INF ADDON: CPT

## 2018-07-13 RX ORDER — SODIUM CHLORIDE 9 MG/ML
INJECTION, SOLUTION INTRAVENOUS ONCE
Status: COMPLETED | OUTPATIENT
Start: 2018-07-13 | End: 2018-07-13

## 2018-07-13 RX ORDER — SODIUM CHLORIDE 9 MG/ML
INJECTION, SOLUTION INTRAVENOUS ONCE
Status: CANCELLED | OUTPATIENT
Start: 2018-07-13

## 2018-07-13 RX ORDER — SODIUM CHLORIDE 0.9 % (FLUSH) 0.9 %
10 SYRINGE (ML) INJECTION PRN
Status: CANCELLED | OUTPATIENT
Start: 2018-07-13

## 2018-07-13 RX ADMIN — SODIUM CHLORIDE: 9 INJECTION, SOLUTION INTRAVENOUS at 09:34

## 2018-07-13 RX ADMIN — MAGNESIUM CHLORIDE: 200 INJECTION INTRAVENOUS at 09:36

## 2018-07-13 NOTE — PROGRESS NOTES
Pt arrives per ambulatory with dtr-in law. Orders reviewed. Pt tolerated mag/vit C well and NS flushing line before and after. Pt has calendars with next appts. Pt discharged per ambulatory with visitor and no reactions or complaints and blood return present throughout infusions.

## 2018-07-20 ENCOUNTER — HOSPITAL ENCOUNTER (OUTPATIENT)
Dept: INFUSION THERAPY | Facility: MEDICAL CENTER | Age: 83
Discharge: HOME | End: 2018-07-20
Payer: MEDICARE

## 2018-07-20 VITALS
RESPIRATION RATE: 16 BRPM | SYSTOLIC BLOOD PRESSURE: 144 MMHG | HEART RATE: 67 BPM | DIASTOLIC BLOOD PRESSURE: 70 MMHG | TEMPERATURE: 97.7 F

## 2018-07-20 DIAGNOSIS — C50.411 MALIGNANT NEOPLASM OF UPPER-OUTER QUADRANT OF RIGHT FEMALE BREAST, UNSPECIFIED ESTROGEN RECEPTOR STATUS (HCC): ICD-10-CM

## 2018-07-20 DIAGNOSIS — D89.9 DISORDER INVOLVING IMMUNE MECHANISM (HCC): ICD-10-CM

## 2018-07-20 PROCEDURE — 96365 THER/PROPH/DIAG IV INF INIT: CPT

## 2018-07-20 PROCEDURE — 96366 THER/PROPH/DIAG IV INF ADDON: CPT

## 2018-07-20 PROCEDURE — 2500000003 HC RX 250 WO HCPCS: Performed by: INTERNAL MEDICINE

## 2018-07-20 PROCEDURE — 2580000003 HC RX 258: Performed by: INTERNAL MEDICINE

## 2018-07-20 RX ORDER — SODIUM CHLORIDE 9 MG/ML
INJECTION, SOLUTION INTRAVENOUS ONCE
Status: COMPLETED | OUTPATIENT
Start: 2018-07-20 | End: 2018-07-20

## 2018-07-20 RX ORDER — SODIUM CHLORIDE 9 MG/ML
INJECTION, SOLUTION INTRAVENOUS ONCE
Status: CANCELLED | OUTPATIENT
Start: 2018-07-20

## 2018-07-20 RX ORDER — SODIUM CHLORIDE 0.9 % (FLUSH) 0.9 %
10 SYRINGE (ML) INJECTION PRN
Status: CANCELLED | OUTPATIENT
Start: 2018-07-20

## 2018-07-20 RX ADMIN — SODIUM CHLORIDE: 9 INJECTION, SOLUTION INTRAVENOUS at 09:17

## 2018-07-20 RX ADMIN — MAGNESIUM CHLORIDE: 200 INJECTION INTRAVENOUS at 09:29

## 2018-07-20 ASSESSMENT — PAIN SCALES - GENERAL: PAINLEVEL_OUTOF10: 0

## 2018-07-20 NOTE — PROGRESS NOTES
Arrives  With family  For vitamin C infusion see mar. Tolerated infusion with out any problems. Discharged to home with family. Next rtc is 7/27/18 at 0900 am.

## 2018-07-24 ENCOUNTER — HOSPITAL ENCOUNTER (OUTPATIENT)
Facility: MEDICAL CENTER | Age: 83
End: 2018-07-24
Payer: MEDICARE

## 2018-07-24 ENCOUNTER — TELEPHONE (OUTPATIENT)
Dept: ONCOLOGY | Age: 83
End: 2018-07-24

## 2018-07-25 ENCOUNTER — TELEPHONE (OUTPATIENT)
Dept: INFUSION THERAPY | Facility: MEDICAL CENTER | Age: 83
End: 2018-07-25

## 2018-07-25 NOTE — TELEPHONE ENCOUNTER
Siobhan Padgett calling MD office to inform office we cannot do fingerstick. Pt. Would need to to have peripheral blood draw per lab  Pt. Also made aware we cannot complete fingerstick.

## 2018-07-25 NOTE — TELEPHONE ENCOUNTER
I RECEIVED A ORDER FOR KAILYN TO GET A GLUCOSE FINGERSTICK TEST WITHIN 5 MINUTES OF FINISHING INFUSION. PER NURSING THEY DO NOT DO A GLUCOSE FINGERSTICK TEST. I CALLED THE OFFICE TO LET THEM KNOW AND ALSO CALLED KAILYN TO LET HER KNOW. THE OFFICE SAID THEY WILL LET THE MD KNOW AND HE WILL DIRECT HER ELSEWHERE FOR THE TEST.

## 2018-07-27 ENCOUNTER — HOSPITAL ENCOUNTER (OUTPATIENT)
Dept: INFUSION THERAPY | Facility: MEDICAL CENTER | Age: 83
Discharge: HOME | End: 2018-07-27
Payer: MEDICARE

## 2018-07-27 VITALS
HEART RATE: 61 BPM | DIASTOLIC BLOOD PRESSURE: 65 MMHG | RESPIRATION RATE: 18 BRPM | TEMPERATURE: 97.7 F | SYSTOLIC BLOOD PRESSURE: 129 MMHG

## 2018-07-27 DIAGNOSIS — D89.9 DISORDER INVOLVING IMMUNE MECHANISM (HCC): ICD-10-CM

## 2018-07-27 DIAGNOSIS — C50.411 MALIGNANT NEOPLASM OF UPPER-OUTER QUADRANT OF RIGHT FEMALE BREAST, UNSPECIFIED ESTROGEN RECEPTOR STATUS (HCC): ICD-10-CM

## 2018-07-27 PROCEDURE — 96366 THER/PROPH/DIAG IV INF ADDON: CPT

## 2018-07-27 PROCEDURE — 96365 THER/PROPH/DIAG IV INF INIT: CPT

## 2018-07-27 PROCEDURE — 2500000003 HC RX 250 WO HCPCS: Performed by: INTERNAL MEDICINE

## 2018-07-27 PROCEDURE — 2580000003 HC RX 258: Performed by: INTERNAL MEDICINE

## 2018-07-27 RX ORDER — SODIUM CHLORIDE 0.9 % (FLUSH) 0.9 %
10 SYRINGE (ML) INJECTION PRN
Status: CANCELLED | OUTPATIENT
Start: 2018-07-27

## 2018-07-27 RX ORDER — SODIUM CHLORIDE 9 MG/ML
INJECTION, SOLUTION INTRAVENOUS ONCE
Status: CANCELLED | OUTPATIENT
Start: 2018-07-27

## 2018-07-27 RX ORDER — SODIUM CHLORIDE 9 MG/ML
INJECTION, SOLUTION INTRAVENOUS ONCE
Status: COMPLETED | OUTPATIENT
Start: 2018-07-27 | End: 2018-07-27

## 2018-07-27 RX ADMIN — SODIUM CHLORIDE: 9 INJECTION, SOLUTION INTRAVENOUS at 09:22

## 2018-07-27 RX ADMIN — MAGNESIUM CHLORIDE: 200 INJECTION INTRAVENOUS at 09:35

## 2018-07-27 ASSESSMENT — PAIN DESCRIPTION - ORIENTATION: ORIENTATION: LEFT

## 2018-07-27 ASSESSMENT — PAIN DESCRIPTION - FREQUENCY: FREQUENCY: OTHER (COMMENT)

## 2018-07-27 ASSESSMENT — PAIN SCALES - GENERAL: PAINLEVEL_OUTOF10: 3

## 2018-07-27 ASSESSMENT — PAIN DESCRIPTION - PAIN TYPE: TYPE: CHRONIC PAIN

## 2018-07-27 ASSESSMENT — PAIN DESCRIPTION - LOCATION: LOCATION: HIP

## 2018-07-27 NOTE — PROGRESS NOTES
Arrives  With daughter for vitamin c infusion see mar. Infusion running per pump over 2-1/2 hours per patient request. Tolerated infusion with out any problems. Discharged to home with daughter. Next rtc is 8/3/18.

## 2018-08-03 ENCOUNTER — HOSPITAL ENCOUNTER (OUTPATIENT)
Dept: INFUSION THERAPY | Facility: MEDICAL CENTER | Age: 83
Discharge: HOME | End: 2018-08-03
Payer: MEDICARE

## 2018-08-03 ENCOUNTER — TELEPHONE (OUTPATIENT)
Dept: ONCOLOGY | Age: 83
End: 2018-08-03

## 2018-08-07 ENCOUNTER — HOSPITAL ENCOUNTER (OUTPATIENT)
Facility: MEDICAL CENTER | Age: 83
End: 2018-08-07
Payer: MEDICARE

## 2018-08-08 ENCOUNTER — HOSPITAL ENCOUNTER (EMERGENCY)
Age: 83
Discharge: HOME | End: 2018-08-08
Attending: EMERGENCY MEDICINE
Payer: MEDICARE

## 2018-08-08 VITALS
WEIGHT: 124 LBS | HEART RATE: 85 BPM | BODY MASS INDEX: 21.17 KG/M2 | DIASTOLIC BLOOD PRESSURE: 70 MMHG | HEIGHT: 64 IN | SYSTOLIC BLOOD PRESSURE: 115 MMHG | TEMPERATURE: 98.8 F | OXYGEN SATURATION: 97 % | RESPIRATION RATE: 16 BRPM

## 2018-08-08 DIAGNOSIS — N39.0 URINARY TRACT INFECTION WITH HEMATURIA, SITE UNSPECIFIED: Primary | ICD-10-CM

## 2018-08-08 DIAGNOSIS — R31.9 URINARY TRACT INFECTION WITH HEMATURIA, SITE UNSPECIFIED: Primary | ICD-10-CM

## 2018-08-08 LAB
-: ABNORMAL
AMORPHOUS: ABNORMAL
BACTERIA: ABNORMAL
BILIRUBIN URINE: NEGATIVE
CASTS UA: ABNORMAL /LPF
COLOR: YELLOW
COMMENT UA: ABNORMAL
CRYSTALS, UA: ABNORMAL /HPF
EPITHELIAL CELLS UA: ABNORMAL /HPF (ref 0–5)
GLUCOSE URINE: NEGATIVE
KETONES, URINE: NEGATIVE
LEUKOCYTE ESTERASE, URINE: ABNORMAL
MUCUS: ABNORMAL
NITRITE, URINE: NEGATIVE
OTHER OBSERVATIONS UA: ABNORMAL
PH UA: 6 (ref 5–8)
PROTEIN UA: ABNORMAL
RBC UA: ABNORMAL /HPF (ref 0–2)
RENAL EPITHELIAL, UA: ABNORMAL /HPF
SPECIFIC GRAVITY UA: 1.01 (ref 1–1.03)
TRICHOMONAS: ABNORMAL
TURBIDITY: ABNORMAL
URINE HGB: ABNORMAL
UROBILINOGEN, URINE: NORMAL
WBC UA: ABNORMAL /HPF (ref 0–5)
YEAST: ABNORMAL

## 2018-08-08 PROCEDURE — 87186 SC STD MICRODIL/AGAR DIL: CPT

## 2018-08-08 PROCEDURE — 87088 URINE BACTERIA CULTURE: CPT

## 2018-08-08 PROCEDURE — 99283 EMERGENCY DEPT VISIT LOW MDM: CPT

## 2018-08-08 PROCEDURE — 6370000000 HC RX 637 (ALT 250 FOR IP): Performed by: EMERGENCY MEDICINE

## 2018-08-08 PROCEDURE — 81001 URINALYSIS AUTO W/SCOPE: CPT

## 2018-08-08 PROCEDURE — 87086 URINE CULTURE/COLONY COUNT: CPT

## 2018-08-08 RX ORDER — PHENAZOPYRIDINE HYDROCHLORIDE 100 MG/1
200 TABLET, FILM COATED ORAL ONCE
Status: COMPLETED | OUTPATIENT
Start: 2018-08-08 | End: 2018-08-08

## 2018-08-08 RX ORDER — PHENAZOPYRIDINE HYDROCHLORIDE 200 MG/1
200 TABLET, FILM COATED ORAL 3 TIMES DAILY PRN
Qty: 3 TABLET | Refills: 0 | Status: SHIPPED | OUTPATIENT
Start: 2018-08-08 | End: 2018-08-11

## 2018-08-08 RX ORDER — SULFAMETHOXAZOLE AND TRIMETHOPRIM 800; 160 MG/1; MG/1
1 TABLET ORAL ONCE
Status: COMPLETED | OUTPATIENT
Start: 2018-08-08 | End: 2018-08-08

## 2018-08-08 RX ORDER — SULFAMETHOXAZOLE AND TRIMETHOPRIM 800; 160 MG/1; MG/1
1 TABLET ORAL 2 TIMES DAILY
Qty: 14 TABLET | Refills: 0 | Status: SHIPPED | OUTPATIENT
Start: 2018-08-08 | End: 2018-08-15

## 2018-08-08 RX ADMIN — SULFAMETHOXAZOLE AND TRIMETHOPRIM 1 TABLET: 800; 160 TABLET ORAL at 20:51

## 2018-08-08 RX ADMIN — PHENAZOPYRIDINE HYDROCHLORIDE 200 MG: 100 TABLET ORAL at 20:51

## 2018-08-08 ASSESSMENT — PAIN DESCRIPTION - PAIN TYPE: TYPE: ACUTE PAIN

## 2018-08-08 ASSESSMENT — ENCOUNTER SYMPTOMS
NAUSEA: 0
DIARRHEA: 0
BACK PAIN: 0
ABDOMINAL PAIN: 0
SORE THROAT: 0
COUGH: 0
RHINORRHEA: 0
SHORTNESS OF BREATH: 0
VOMITING: 0
EYE PAIN: 0

## 2018-08-08 ASSESSMENT — PAIN SCALES - GENERAL: PAINLEVEL_OUTOF10: 8

## 2018-08-08 ASSESSMENT — PAIN DESCRIPTION - ORIENTATION: ORIENTATION: LOWER

## 2018-08-08 ASSESSMENT — PAIN DESCRIPTION - LOCATION: LOCATION: BACK

## 2018-08-09 NOTE — ED PROVIDER NOTES
[facility] ED  [address] [address]  Phone: [phone]  Fax: [phone]    eMERGENCY dEPARTMENT eNCOUnter          Pt Name: Jerson Acuna  MRN: [id number]  [place] 1934  Date of evaluation: 8/8/2018      CHIEF COMPLAINT       Chief Complaint   Patient presents with    Urinary Frequency     started today     Dysuria    Tailbone Pain     x1 week       HISTORY OF PRESENT ILLNESS      HPI      Jerson Acuna is a 80 y.o. female who presents With dysuria that began earlier this afternoon. No recent antibiotic use or urinary tract infections. Does also report some pain to her left buttocks region that has been there for 1-2 weeks. Denies any neurologic symptoms. States the pain improves with Advil to the buttocks. No distal numbness/weakness/paresthesias. No radiation of the pain. Bowel movements have been normal.  No vomiting. No fevers. No lower back pain or evidence of pyelonephritis clinically. Denies other symptoms or concerns. REVIEW OF SYSTEMS         Review of Systems   Constitutional: Negative for chills, fatigue and fever. HENT: Negative for rhinorrhea and sore throat. Eyes: Negative for pain. Respiratory: Negative for cough and shortness of breath. Cardiovascular: Negative for chest pain. Gastrointestinal: Negative for abdominal pain, diarrhea, nausea and vomiting. Genitourinary: Positive for dysuria and frequency. Negative for difficulty urinating and pelvic pain. Musculoskeletal: Negative for back pain and neck pain. Skin: Negative for rash. Neurological: Negative for weakness and headaches. All other systems reviewed and are negative. PAST MEDICAL HISTORY    has a past medical history of Anxiousness; Depressed; High cholesterol; History of appetite changes; Invasive ductal carcinoma of breast ([address] Lymphedema; Nausea & vomiting; Nervous; Wears glasses; and Weight gain.     SURGICAL HISTORY      has a past Amorphous, UA NOT REPORTED NONE    Other Observations UA NOT REPORTED NREQ    Yeast, UA NOT REPORTED NONE       EMERGENCY DEPARTMENT COURSE:     The patient was given the following medications:  Orders Placed This Encounter   Medications    sulfamethoxazole-trimethoprim (BACTRIM DS) 800-160 MG per tablet     Sig: Take 1 tablet by mouth 2 times daily for 7 days     Dispense:  14 tablet     Refill:  0    phenazopyridine (PYRIDIUM) 200 MG tablet     Sig: Take 1 tablet by mouth 3 times daily as needed for Pain (bladder spasm/pain)     Dispense:  3 tablet     Refill:  0    phenazopyridine (PYRIDIUM) tablet 200 mg    sulfamethoxazole-trimethoprim (BACTRIM DS;SEPTRA DS) 800-160 MG per tablet 1 tablet        Vitals:    Vitals:    08/08/18 2010 08/08/18 2059   BP: (!) 135/93 115/70   Pulse: 85    Resp: 16    Temp: 98.8 °F (37.1 °C)    TempSrc: Oral    SpO2: 97%    Weight: 56.2 kg (124 lb)    Height: 5' 4\" (1.626 m)      -------------------------  BP: 115/70, Temp: 98.8 °F (37.1 °C), Pulse: 85, Resp: 16      Re-evaluation Notes    9:02 PM:   Patient doing well on reevaluation. No acute changes. Does have a urinary tract infection. We'll start her on Bactrim. We'll give a short course of Pyridium to help with the symptoms. Clinically she appears very well and is nontoxic. Benign abdominal exam.  I do not feel she is pyelonephritis. Afebrile. Vital signs are unremarkable. She was advised to follow-up with her PCP or return right away if worse or for new or concerning symptoms. Patient is comfortable with this plan. I have reviewed the disposition diagnosis with the patient and or their family/guardian. I have answered their questions and given discharge instructions. They voiced understanding of these instructions and did not have any further questions or complaints. CONSULTS:    None    CRITICAL CARE:     None    PROCEDURES:    None    FINAL IMPRESSION      1.  Urinary tract infection with hematuria, site unspecified          DISPOSITION/PLAN   DISPOSITION Decision To Discharge 08/08/2018 09:00:03 PM      Condition on Disposition    Improved    PATIENT REFERRED TO:  Lilia Collins MD  [phone]  [address]  [phone]    Schedule an appointment as soon as possible for a visit in 3 days        DISCHARGE MEDICATIONS:  New Prescriptions    PHENAZOPYRIDINE (PYRIDIUM) 200 MG TABLET    Take 1 tablet by mouth 3 times daily as needed for Pain (bladder spasm/pain)    SULFAMETHOXAZOLE-TRIMETHOPRIM (BACTRIM DS) 800-160 MG PER TABLET    Take 1 tablet by mouth 2 times daily for 7 days       (Please note that portions of this note were completed with a voice recognition program.  Efforts were made to edit the dictations but occasionally words are mis-transcribed.)    Ines Arce DO  Attending Emergency Physician         Ines Arce DO  08/08/18 1389

## 2018-08-10 ENCOUNTER — TELEPHONE (OUTPATIENT)
Dept: INFUSION THERAPY | Facility: MEDICAL CENTER | Age: 83
End: 2018-08-10

## 2018-08-10 ENCOUNTER — HOSPITAL ENCOUNTER (OUTPATIENT)
Dept: INFUSION THERAPY | Facility: MEDICAL CENTER | Age: 83
Discharge: HOME | End: 2018-08-10
Payer: MEDICARE

## 2018-08-10 LAB
CULTURE: ABNORMAL
Lab: ABNORMAL
ORGANISM: ABNORMAL
SPECIMEN DESCRIPTION: ABNORMAL
STATUS: ABNORMAL

## 2018-08-15 ENCOUNTER — HOSPITAL ENCOUNTER (OUTPATIENT)
Dept: INFUSION THERAPY | Facility: MEDICAL CENTER | Age: 83
Discharge: HOME | End: 2018-08-15
Payer: MEDICARE

## 2018-08-15 VITALS
DIASTOLIC BLOOD PRESSURE: 60 MMHG | HEART RATE: 59 BPM | SYSTOLIC BLOOD PRESSURE: 120 MMHG | RESPIRATION RATE: 16 BRPM | TEMPERATURE: 97.7 F

## 2018-08-15 DIAGNOSIS — D89.9 DISORDER INVOLVING IMMUNE MECHANISM (HCC): ICD-10-CM

## 2018-08-15 DIAGNOSIS — C50.411 MALIGNANT NEOPLASM OF UPPER-OUTER QUADRANT OF RIGHT FEMALE BREAST, UNSPECIFIED ESTROGEN RECEPTOR STATUS (HCC): ICD-10-CM

## 2018-08-15 PROCEDURE — 96365 THER/PROPH/DIAG IV INF INIT: CPT

## 2018-08-15 PROCEDURE — 2580000003 HC RX 258: Performed by: INTERNAL MEDICINE

## 2018-08-15 PROCEDURE — 2500000003 HC RX 250 WO HCPCS: Performed by: INTERNAL MEDICINE

## 2018-08-15 PROCEDURE — 96366 THER/PROPH/DIAG IV INF ADDON: CPT

## 2018-08-15 RX ORDER — SODIUM CHLORIDE 9 MG/ML
INJECTION, SOLUTION INTRAVENOUS ONCE
Status: COMPLETED | OUTPATIENT
Start: 2018-08-15 | End: 2018-08-15

## 2018-08-15 RX ORDER — SODIUM CHLORIDE 0.9 % (FLUSH) 0.9 %
10 SYRINGE (ML) INJECTION PRN
Status: CANCELLED | OUTPATIENT
Start: 2018-08-15

## 2018-08-15 RX ORDER — SODIUM CHLORIDE 9 MG/ML
INJECTION, SOLUTION INTRAVENOUS ONCE
Status: CANCELLED | OUTPATIENT
Start: 2018-08-15

## 2018-08-15 RX ADMIN — MAGNESIUM CHLORIDE: 200 INJECTION INTRAVENOUS at 09:44

## 2018-08-15 RX ADMIN — SODIUM CHLORIDE: 9 INJECTION, SOLUTION INTRAVENOUS at 09:39

## 2018-08-15 NOTE — PROGRESS NOTES
Chanelle Osvaldo here per ambulatory for weekly treatment for breast cancer. Pt states she is tired and weak, recently place on bactrim for urinary infection. Obtained iv without difficulty, excellent blood return. Started treatment at 350 ml/hr. Infused without difficulty over 3 hours. Pt back next week for next treatment.

## 2018-08-22 ENCOUNTER — HOSPITAL ENCOUNTER (OUTPATIENT)
Dept: INFUSION THERAPY | Facility: MEDICAL CENTER | Age: 83
Discharge: HOME | End: 2018-08-22
Payer: MEDICARE

## 2018-08-22 VITALS
DIASTOLIC BLOOD PRESSURE: 58 MMHG | HEART RATE: 48 BPM | RESPIRATION RATE: 18 BRPM | SYSTOLIC BLOOD PRESSURE: 122 MMHG | TEMPERATURE: 46.8 F

## 2018-08-22 DIAGNOSIS — C50.411 MALIGNANT NEOPLASM OF UPPER-OUTER QUADRANT OF RIGHT FEMALE BREAST, UNSPECIFIED ESTROGEN RECEPTOR STATUS (HCC): ICD-10-CM

## 2018-08-22 DIAGNOSIS — D89.9 DISORDER INVOLVING IMMUNE MECHANISM (HCC): ICD-10-CM

## 2018-08-22 PROCEDURE — 96366 THER/PROPH/DIAG IV INF ADDON: CPT

## 2018-08-22 PROCEDURE — 2580000003 HC RX 258: Performed by: INTERNAL MEDICINE

## 2018-08-22 PROCEDURE — 2500000003 HC RX 250 WO HCPCS: Performed by: INTERNAL MEDICINE

## 2018-08-22 PROCEDURE — 96365 THER/PROPH/DIAG IV INF INIT: CPT

## 2018-08-22 RX ORDER — SODIUM CHLORIDE 9 MG/ML
INJECTION, SOLUTION INTRAVENOUS ONCE
Status: CANCELLED | OUTPATIENT
Start: 2018-08-22

## 2018-08-22 RX ORDER — SODIUM CHLORIDE 9 MG/ML
INJECTION, SOLUTION INTRAVENOUS ONCE
Status: COMPLETED | OUTPATIENT
Start: 2018-08-22 | End: 2018-08-22

## 2018-08-22 RX ORDER — SODIUM CHLORIDE 0.9 % (FLUSH) 0.9 %
10 SYRINGE (ML) INJECTION PRN
Status: CANCELLED | OUTPATIENT
Start: 2018-08-22

## 2018-08-22 RX ADMIN — MAGNESIUM CHLORIDE: 200 INJECTION INTRAVENOUS at 09:36

## 2018-08-22 RX ADMIN — SODIUM CHLORIDE: 9 INJECTION, SOLUTION INTRAVENOUS at 09:26

## 2018-08-29 ENCOUNTER — TELEPHONE (OUTPATIENT)
Dept: ONCOLOGY | Age: 83
End: 2018-08-29

## 2018-08-29 NOTE — TELEPHONE ENCOUNTER
DAUGHTER CALLED TO CANCEL NEXT TWO INFUSIONS. SAID SHE WILL CALL TO RESCHEDULE BUT AS OF RIGHT NOW HER LYMPHEDEMA HAS BEEN SO OUT OF HAND THAT THEY WANT TO FOCUS ON GETTING THAT BETTER BEFORE SHE HAS ANY MORE INFUSIONS.

## 2018-08-31 ENCOUNTER — HOSPITAL ENCOUNTER (OUTPATIENT)
Dept: OCCUPATIONAL THERAPY | Age: 83
Setting detail: THERAPIES SERIES
Discharge: HOME | End: 2018-08-31
Payer: MEDICARE

## 2018-08-31 PROCEDURE — G8987 SELF CARE CURRENT STATUS: HCPCS

## 2018-08-31 PROCEDURE — 97140 MANUAL THERAPY 1/> REGIONS: CPT

## 2018-08-31 PROCEDURE — 97530 THERAPEUTIC ACTIVITIES: CPT

## 2018-08-31 PROCEDURE — G8988 SELF CARE GOAL STATUS: HCPCS

## 2018-08-31 PROCEDURE — 97166 OT EVAL MOD COMPLEX 45 MIN: CPT

## 2018-08-31 NOTE — FLOWSHEET NOTE
AFTER VISIT SUMMARY 2018  Afshan Amado  MR#  [id number]    1934       [facility]     _____________________________________________________________________  [facility] OUT[facility] DISCHARGE INSTRUCTIONS FOR:____  Self massage Instructions:  · Massage arm pits toward center of chest  · Massage Right arm upward  · Massage arm pits again  · Massage down right side of body toward groin  · Massage across groin area toward opposite side  · Massage upward on the other side of the body toward the opposite armpit  · Massage Left arm pit toward center of chest  · Massage Right breast at an angle toward armpit  · End with massaging arm pits one last time    Frequency: 1-2 x/day    * Precautions  -Pay special attention to areas that are numb and in any folds or creases for any sign of infection or constriction.  -If you find a cut or area of open skin, clean it well, apply an over-the-counter antibacterial cream and cover with a \"band-aid\". -If you experience any of the following symptoms of infection, please call your Doctor immediately. If you need medical attention outside business hours, place go to the nearest emergency room. · Redness that you don't usually have with part or all of your limb feeling warmer or hotter than usual  · A new or different pain in your arm or leg  · The affected area is more swollen than usual  · You feel that you have flu-like symptoms  · A fever and/or red streaks on the limb  If you have any questions, please call me and leave a message. I will return your call ASAP. Next Visit: __________. If you are unable to keep your appointment, kindly give a 24 hour notice by calling [phone]. Electronically signed by:  ELIESER Cruz CLT  O: [phone]  Fax: [phone]  [email or web address]

## 2018-08-31 NOTE — CONSULTS
[facility]  Lymphedema Services  Initial Evaluation    Date: 18  Patients Name: Joanna Dumont  :34  Gender: female  TYM:[id number]  Insurance: Medicare, AARP supplement  Referring Practitioner: Marvin Chanel MD  Time:8:00-9:30  Visit number:1    Treatment Charges:     Minutes      Units      Evaluation            Low 15 1          Moderate            High        Manual Therapy 45 3      Therapeutic Activity 30 2      Other          Total Treatment Time    75 6     Daily Charge: $194.89  Previous Total:          0  Current Total:$194.89    Patient is a _80_year old []male [x]female referred to the Lymphedema Clinic with a diagnosis of _RUE and UOQ of chest wall___Lymphedema d/t Advanced Breast Cancer at the request of Dr Marvin Chanel. Pt diagnosed with R breast CA in  with a mild progression of the metastatic disease around the R breast since that time. Pt has been treated exclusively through homeopathic and natural means which is contrary to recommendations by typical allopathic medicine. Pt has advanced intraductal carcinoma with progression that involves enlargement of lymph nodes in the supraclavicular fossa and axillary region. Pt lives with supportive spouse, uses cane for stability during ambulation. Pt is independent with ADLs.           ICD 10 Code:  [x] I89.0   Secondary lymphedema, not otherwise classified  [] Q82.0  Primary lymphedema (congenital) including lymphedema tarda (> 34yo)  [] I97.2   Secondary lymphedema d/t postmastectomy  [] I87.2/L97.929(L)  CVI with 6 mo non-healing venous stasis ulcer  [] I87.2/L97.919(R) CVI with 6 mo non-healing venous stasis ulcer  [] E88.2 lipomatosis NOS   [x] C50.911 Malignant neoplasm of R breast     Restrictions/Precautions:   [x] No blood pressure/blood draw in: [] Left Upper Extremity [x] Right Upper Extremity      [x] Fall Risk       [x] No   [] Yes  Intervention:   []Other:    Pain:  [] No    [x] []  Soft, doughy  Other Symptoms:  [] Hyperkeratosis        [] Hyperplasia             [] Hyperpigmentation/hemosiderin staining    [] Skin breakdown with lymphorrhea             [] Recent cellulitis    [] Fibrosis        [] Papillomatosis        [] fatty lobules        [] Elephantiasis  [] Truncal/abdominal swelling [] Chest/axillary swelling     [] Genital swelling  [] Impaired ROM       [] Impaired mobility           [] Other  [] Stemmers sign:         [] Scars:                     [] Thick/rigid  [] Raised   [] Flattened   [] Softened  [] Mobility of scar:      [] Poor           [] Fair        [] Good         [] Normal       [] Other:    Wounds:  [] Shallow, painlful venous   [] skin denudement (top layer removed by moisture)  [] Radiation burns/ulcers     [] Superficial abrasions  [] Excoriation (compulsive picking) [] Being addressed by Nimco Mcneil  [] Normal [] Mottled [x] Flushed/erythema    [] Other/Description  Location of problem area/s:    Edema  Edema Location:  [] 1+ Edema [] 2+ Edema  [x] 3+ Edema [] 4+ Edema  Edema Location:  [] 1+ Edema [] 2+ Edema  [] 3+ Edema [] 4+ Edema  Edema Location:  [] 1+ Edema [] 2+ Edema  [] 3+ Edema [] 4+ Edema    When did swelling start? Recently  What are the potential triggers for swelling?   Aggravating factors: no compression  Relieving factors: massage following today's visit    Subjective: \"my arm and chest are starting to swell more and can be very painful\"  Objective:   [x] Measurement [x]Manual Lymph Drainage [] Bandaging [] Kinesiotaping  [x] Education  [x]Self Massage  []Self Bandaging  [] Exercise  [] Wound Care  [] Hygiene [] Elevation     Other:     Education Included:  [x] General Lymphedema Information  [x] Dos and Donts  [x] Self Massage  []Home Exercises [] Self Bandaging [] Kinesiotaping  []  Hygiene  Learner: [x] Patient [] Spouse  []Other  [x] Family-daughter  Method: [x] Verbal [x] Demonstration [x] Handouts x4 [] Other  Response:  [x] Verbalized understanding [x] Demonstrated understanding      [] Needs assist            [] No understanding    Physical Status:  Range of motion: Deficits [] Yes [x] No       Comment:  Strength:       Deficits [] Yes [x] No        Comment:  Sensation:       Deficits [] Yes [x] No        Comment:  Transfer:       Deficits [] Yes [x] No        Comment:  Ambulation:       Deficits [x] Yes [] No        Comment:uses cane for support  Functional Status:  Self Care:   [x] Independent [] Needs Assist [] Dependent   Home Maintenance:    [] Independent [x] Needs Assist [] Dependent  Comments:  Fatigue:   [] None identified [x] Minimum  [] Moderate [] Significant  Comments:  Psychosocial Status:  [x] Confident, independent  [] limited ADLs  [] stress, isolation, fear of future [] sense of loss of control over life  AAO x 3:             [x] Yes [] No        Comment: memory loss, looks to daughter to answer questions  Domestic Concern: none noted, supportive daughters assists as needed  Suggested Professional Referral:    Measurements Upper Extremity  Measurements are made in 4 cm increments and are circumferential. Fingers are measured at base. CM Right Left   20 29.5 26.5   16 30.5 24   12 32.5 23   8 32 22   4 32 21.5   Olecranon 28 22   20 26 21   16 24 19.5   12 22 17   8 19.5 15.5   4 18 14   Wrist 17 15   D[place]um 20.5 18.5   SF 5 5.5   RF 5 5.5   MF 5.5 5.5   IF 6 6   TH 6 7   Total 359 289         Assessment  Knowledge of home program:  [] Good [x] Fair  [] Poor  Family can assist with programming: [] Yes  [x] No  Other:   Instructions for patient:   [x] Verbal [x] Demonstration [x] Written  Instructions addressed: pt arrived with daughter to address swelling of RUE and chest wall. BLEs examined, measured. Fibrosis noted to R upper arm, axilla and chest wall. R axillary lymph nodes enlarged and very painful to touch. R breast and chest wall erythematic, very firm and congested.   Daughter reminders  Response to treatment:pt receptive to education  Patients Goal:reduce swelling and pain    Therapy Goals  Pt will be seen 1-3 times a week to address:  [x] 1) Patient will be educated and express understanding of causative factors for Lymphedema, prevention of exacerbations, skin care, exercise, self massage, self bandaging (if indicated), and compression garment application (if indicated). STG: Every treatment  LT__ weeks  [x] 2) Patient or caregiver will consistently follow home programming instructions from appointment to appointment including bandaging, self massage, exercise or any additional intervention recommended. STG:_6_ weeks   LTG: _12__ weeks  [x] 3) Patient will realize limb reduction as evidenced by decrease in limb measurements. STG: 3% reduction__6_ weeks   LTG: __5%__ reduction _12__ weeks  [x] 4). Patient will be referred for fitting of a compression garment or alternative compression when maximum results are achieved. (Indication are no further changes in numerical status or changes in tissue integrity.) LTG: _12_ weeks  [] 5). Pt will increase his/her level of function as demonstrated by increased ability to _____________.  LTG: ___ weeks  [] 6). _________ STG: __ weeks   LTG: __ weeks    Plan: Continue to address:  []Bandaging  [] Self Bandaging/Family Assist  [x]MLD  [x]Self Massage  []Exercise  [x]Education  [x]Obtain referral for garments  []Medical Hold  []Discharge to Home Program    G-Codes:    [x] Eval _18___ [] 10th visit____  []  visit ____ [] 30 visit____  [] D/C ____     Functional Limitation: 34/72, 47%  Functional Assessment Used: Lymphedema Life Impact Scale (LLIS)  Current Status Modifier: CK  Goal Status Modifier: CJ  Discharge Status Modifier:    Physican signature is required for :  [] Plan of Care  [x]Medicare Requirement      Nilton Merida MOT,OTR/L, CLT

## 2018-09-07 ENCOUNTER — HOSPITAL ENCOUNTER (OUTPATIENT)
Dept: OCCUPATIONAL THERAPY | Age: 83
Setting detail: THERAPIES SERIES
Discharge: HOME | End: 2018-09-07
Payer: MEDICARE

## 2018-09-07 PROCEDURE — 97140 MANUAL THERAPY 1/> REGIONS: CPT

## 2018-09-07 NOTE — FLOWSHEET NOTE
[x] Yes  Location:R breast and axilla region  Pain Rating (0-10 pain scale): 2/10 currently, but fluctuates at times to be much greater  Pain Description:  [] Achy,worse at end of day  [] distention, discomfort  [x] painful with palpation  [] guarding, tingling, numbness      [] Hypersensitivity    [] Radiation fibrosis  Interruption of Treatment [] Yes  [x] No    Medications:  [x]See charted information    Past Medical History:  [x] See charted information  Polyarthritis, systemic lupus erythematosus, degenerative disc disease and scoliosis, major depressive disorder    Conservative Treatments Utilized in the Past: none  [] Compression Garments [] Bandaging    [] Elevation  [] Exercise      []Self MLD  Frequency/Duration:    Lymphedema Contraindication Assessment:  [] CHF   []DVT    []Kidney Problems  []Cellulitis (warmth, tenderness, less defined erythematous borders)   [] Erysipelas (fiery/red plague, raised defined borders)   Clearance needed:  []Yes  [x]No  [] Obtained  Physician giving Clearance:    Cancer history  [x]Yes  []No  Location:R breast  When Diagnosed: 2014  Surgery:  []Yes  [x]No  Details:  Node Dissection:  []Yes  [x]No  Where removed:  Chemotherapy: []Yes  [x]No  []Pending []In progress []Completed  Radiation:  []Yes  [x]No  []Pending []In progress []Completed    Came to Clinic  [] Bandaged  ---  ---  [x]Unbandaged  ---  ---  [] With [place] [address]   ---  ---  [] With Sleeve   ---  ---  [] [place]  ---  ---  []With alternative compression:  [] Kinesiotaped  [] R. Hand    [] Left Hand   [] R. Arm   [] Left Arm   [] R. Leg  [] Left Leg           [] R.  Breast  [] L Breast    []Right Upper Back    [] L Upper Back   [] Other:    Skin Integrity/Appearance- location: R axilla, chest wall, R lateral neck  [] Normal [] Dry   []Moist/weeping/blisters     [x] Warmth/mild inflamation  [] Brawny/hardened       [] [place] hump-dorsum        [] Rash        [x] Reddness  [] Crusted/bumpy       [x] Firm edema [] Loose, lobular     []  Soft, doughy  Other Symptoms:  [] Hyperkeratosis        [] Hyperplasia             [] Hyperpigmentation   [] Skin breakdown with lymphorrhea             [] Recent cellulitis    [] Fibrosis        [] Papillomatosis        [] fatty lobules        [] Elephantiasis  [] Truncal/abdominal swelling [x] Chest/axillary swelling     [] Genital swelling  [] Impaired ROM       [] Impaired mobility           [] Other  [] Stemmers sign:         [] Scars:                     [] Thick/rigid  [] Raised   [] Flattened   [] Softened  [] Mobility of scar:      [] Poor           [] Fair        [] Good         [] Normal       [] Other:    Wounds:  [] Shallow, painlful venous   [] skin denudement (top layer removed by moisture)  [] Radiation burns/ulcers     [] Superficial abrasions  [] Excoriation (compulsive picking) [] Being addressed by Nimco Mcneil  [] Normal [] Mottled [x] Flushed/erythema    [] Other/Description  Location of problem area/s:chest wall, R lateral neck, axilla  Edema  Edema Location:  [] 1+ Edema [] 2+ Edema  [x] 3+ Edema [] 4+ Edema  Edema Location:  [] 1+ Edema [] 2+ Edema  [] 3+ Edema [] 4+ Edema  Edema Location:  [] 1+ Edema [] 2+ Edema  [] 3+ Edema [] 4+ Edema    When did swelling start? Recently  What are the potential triggers for swelling?   Aggravating factors: no compression  Relieving factors: massage following today's visit    Subjective: \"I've been doing my massage many times a day\"  Objective:   [x] Measurement [x]Manual Lymph Drainage [] Bandaging [] Kinesiotaping  [x] Education  [x]Self Massage  []Self Bandaging  [] Exercise  [] Wound Care  [] Hygiene [] Elevation     Other:     Education Included:  [x] General Lymphedema Information  [x] Dos and Donts  [x] Self Massage  []Home Exercises [] Self Bandaging [] Kinesiotaping  []  Hygiene  Learner: [x] Patient [] Spouse  []Other  [x] Family-daughter  Method: [x] Verbal [x] Demonstration [] Handouts  [] Exercise swelling and pain    Therapy Goals  Pt will be seen 1-3 times a week to address:  [x] 1) Patient will be educated and express understanding of causative factors for Lymphedema, prevention of exacerbations, skin care, exercise, self massage, self bandaging (if indicated), and compression garment application (if indicated). STG: Every treatment  LT__ weeks  [x] 2) Patient or caregiver will consistently follow home programming instructions from appointment to appointment including bandaging, self massage, exercise or any additional intervention recommended. STG:_6_ weeks   LTG: _12__ weeks  [x] 3) Patient will realize limb reduction as evidenced by decrease in limb measurements. STG: 3% reduction__6_ weeks   LTG: __5%__ reduction _12__ weeks  [x] 4). Patient will be referred for fitting of a compression garment or alternative compression when maximum results are achieved. (Indication are no further changes in numerical status or changes in tissue integrity.) LTG: _12_ weeks  [] 5). Pt will increase his/her level of function as demonstrated by increased ability to _____________.  LTG: ___ weeks  [] 6). _________ STG: __ weeks   LTG: __ weeks    Plan: Continue to address:  []Bandaging  [] Self Bandaging/Family Assist  [x]MLD  [x]Self Massage  []Exercise  [x]Education  [x]Obtain referral for garments  []Medical Hold  []Discharge to Home Program    G-Codes:    [x] Eval _18___ [] 10th visit____  []  visit ____ []  visit____  [] D/C ____     Functional Limitation: 34/72, 47%  Functional Assessment Used: Lymphedema Life Impact Scale (LLIS)  Current Status Modifier: CK  Goal Status Modifier: CJ  Discharge Status Modifier:    Physican signature is required for :  [] Plan of Care  [x]Medicare Requirement      Nilesh Pack MOT,OTR/L, CLT

## 2018-09-14 ENCOUNTER — HOSPITAL ENCOUNTER (OUTPATIENT)
Dept: OCCUPATIONAL THERAPY | Age: 83
Setting detail: THERAPIES SERIES
Discharge: HOME | End: 2018-09-14
Payer: MEDICARE

## 2018-09-14 PROCEDURE — 97166 OT EVAL MOD COMPLEX 45 MIN: CPT

## 2018-09-14 NOTE — FLOWSHEET NOTE
[place] hump-dorsum        [] Rash        [x] Reddness  [] Crusted/bumpy       [x] Firm edema     [] Loose, lobular     []  Soft, doughy  Other Symptoms:  [] Hyperkeratosis        [] Hyperplasia             [] Hyperpigmentation   [] Skin breakdown with lymphorrhea             [] Recent cellulitis    [x] Fibrosis  RUE      [] Papillomatosis        [] fatty lobules        [] Elephantiasis  [] Truncal/abdominal swelling [x] Chest/axillary swelling     [] Genital swelling  [] Impaired ROM       [] Impaired mobility           [] Other  [] Stemmers sign:         [] Scars:                     [] Thick/rigid  [] Raised   [] Flattened   [] Softened  [] Mobility of scar:      [] Poor           [] Fair        [] Good         [] Normal       [] Other:    Wounds:  [] Shallow, painlful venous   [] skin denudement (top layer removed by moisture)  [] Radiation burns/ulcers     [] Superficial abrasions  [] Excoriation (compulsive picking) [] Being addressed by Laura Lawson        Color  [] Normal [] Mottled [x] Flushed/erythema    [] Other/Description  Location of problem area/s: RUE, chest wall, R lateral neck, axilla  Edema  Edema Location:  [] 1+ Edema [] 2+ Edema  [x] 3+ Edema [] 4+ Edema  Edema Location:  [] 1+ Edema [] 2+ Edema  [] 3+ Edema [] 4+ Edema  Edema Location:  [] 1+ Edema [] 2+ Edema  [] 3+ Edema [] 4+ Edema    When did swelling start? Recently  What are the potential triggers for swelling?   Aggravating factors: no compression  Relieving factors: massage following today's visit, wearing compression sleeve    Subjective: \"I've been doing my massage many times a day\"  Objective:   [x] Measurement [x]Manual Lymph Drainage [] Bandaging [] Kinesiotaping  [x] Education  [x]Self Massage  []Self Bandaging  [] Exercise  [] Wound Care  [] Hygiene [] Elevation     Other:     Education Included:  [x] General Lymphedema Information  [x] Dos and Donts  [x] Self Massage  []Home Exercises [] Self Bandaging [] Kinesiotaping []  Hygiene  Learner: [x] Patient [] Spouse  []Other  [x] Family-daughter  Method: [x] Verbal [x] Demonstration [] Handouts  [] Exercise Booklet  Response:  [x] Verbalized understanding [x] Demonstrated understanding      [] Needs assist            [] No understanding    Physical Status:  Range of motion: Deficits [] Yes [x] No       Comment:  Strength:       Deficits [] Yes [x] No        Comment:  Sensation:       Deficits [] Yes [x] No        Comment:  Transfer:       Deficits [] Yes [x] No        Comment:  Ambulation:       Deficits [x] Yes [] No        Comment: uses cane for support  Functional Status:  Self Care:   [x] Independent [] Needs Assist [] Dependent   Home Maintenance:    [] Independent [x] Needs Assist [] Dependent  Comments:  Fatigue:   [] None identified [x] Minimum  [] Moderate [] Significant  Comments:  Psychosocial Status:  [x] Confident, independent  [] limited ADLs  [] stress, isolation, fear of future [] sense of loss of control over life  AAO x 3:             [x] Yes [] No        Comment: memory loss, looks to daughter to answer questions, requires frequent repetition of instructions  Domestic Concern: none noted, supportive daughters assists as needed  Suggested Professional Referral:    Measurements Upper Extremity  Measurements are made in 4 cm increments and are circumferential. Fingers are measured at base. CM Right Left   20 29    16 31    12 31.5    8 31.5    4 29    Olecranon 26    20 24.5    16 23.5    12 21.5    8 20    4 17.5    Wrist 16.5    Dorsum 20    SF 5    RF 5    MF 6    IF 6    TH 6.5    Total 350          Assessment  Knowledge of home program:  [] Good [x] Fair  [] Poor  Family can assist with programming: [] Yes  [x] No  Other:   Instructions for patient:   [x] Verbal [x] Demonstration [] Written  Instructions addressed: pt arrived with  for re-check to address swelling of RUE and chest wall. RLE examined, measured. A swelling reduction of -12cm noted. Scale (LLIS)  Current Status Modifier: CK  Goal Status Modifier: CJ  Discharge Status Modifier:    Physican signature is required for :  [] Plan of Care  [x]Medicare Requirement      Othelsrikanth Herreras MOT,OTR/L, CLT

## 2018-09-14 NOTE — FLOWSHEET NOTE
AFTER VISIT SUMMARY 2018  Abhishek Aragon  MR#  [id number]    1934       [place]     _____________________________________________________________________  [facility]  Self Massage instructions:    · Massage R side of neck downward toward collar bone  · Massage R armpit  · Massage R arm upward  · Massage R armpit  · Massage abdomin upward toward center of ribs  · Massage R armpit  · Massage chestwall toward R armpit  · Massage R armpit     Frequency:  · Perform massage techniques 2-3x/day  · Wear compression sleeve all day  · Night-time, wear tubing on R arm and hand. Take off in the mornng  · .after shower and massage routine, wear compression sleeve all day  ·   * Precautions  -Pay special attention to areas that are numb and in any folds or creases for any sign of infection or constriction.  -If you find a cut or area of open skin, clean it well, apply an over-the-counter antibacterial cream and cover with a \"band-aid\". -If you experience any of the following symptoms of infection, please call your Doctor immediately. If you need medical attention outside business hours, place go to the nearest emergency room. · Redness that you don't usually have with part or all of your limb feeling warmer or hotter than usual  · A new or different pain in your arm or leg  · The affected area is more swollen than usual  · You feel that you have flu-like symptoms  · A fever and/or red streaks on the limb  If you have any questions, please call me and leave a message. I will return your call ASA. Next Visit:  at 11:00 with Heath Rosales at [facility]           at 9:00   with Heath Rosales at [facility]  If you are unable to keep your appointment, kindly give a 24 hour notice by calling [phone]. Electronically signed by: GERRY Aponte/L, WARD  O: [phone]  Fax: [phone]  [email or web address]

## 2018-09-17 ENCOUNTER — HOSPITAL ENCOUNTER (OUTPATIENT)
Dept: OCCUPATIONAL THERAPY | Age: 83
Setting detail: THERAPIES SERIES
Discharge: HOME | End: 2018-09-17
Payer: MEDICARE

## 2018-09-17 PROCEDURE — 97140 MANUAL THERAPY 1/> REGIONS: CPT

## 2018-09-17 NOTE — FLOWSHEET NOTE
[facility]  Lymphedema Services  Daily Progress Note    Date: 18  Patients Name: Lani Oneill  :34  Gender: female  DJW:[id number]  Insurance: Medicare, Junita Bill supplement-N  Referring Practitioner: Toi Dougherty MD  Time:2:35-3:25  Visit number:4    Treatment Charges:     Minutes      Units      Evaluation            Low            Moderate            High        Manual Therapy 55 4      Therapeutic Activity        Other          Total Treatment Time    55 4     Daily Charge: $   90.17  Previous Total:  428.85  Current Total:$  519.02    Patient is a _80_year old []male [x]female referred to the Lymphedema Clinic with a diagnosis of _RUE and UOQ of R chest wall___Lymphedema d/t Advanced Breast Cancer at the request of Dr Toi Dougherty. Pt diagnosed with R breast CA in  with a mild progression of the metastatic disease around the R breast since that time. Pt has been treated exclusively through homeopathic and natural means which is contrary to recommendations by typical allopathic medicine. Pt has advanced intraductal carcinoma with progression that involves enlargement of lymph nodes in the supraclavicular fossa and axillary region. Pt lives with supportive spouse, uses cane for stability during ambulation. Pt is independent with ADLs.           ICD 10 Code:  [x] I89.0   Secondary lymphedema, not otherwise classified  [] Q82.0  Primary lymphedema (congenital) including lymphedema tarda (> 36yo)  [] I97.2   Secondary lymphedema d/t postmastectomy  [] I87.2/L97.929(L)  CVI with 6 mo non-healing venous stasis ulcer  [] I87.2/L97.919(R) CVI with 6 mo non-healing venous stasis ulcer  [] E88.2 lipomatosis NOS   [x] C50.911 Malignant neoplasm of R breast     Restrictions/Precautions:   [x] No blood pressure/blood draw in: [] Left Upper Extremity [x] Right Upper Extremity      [x] Fall Risk       [x] No   [] Yes  Intervention: uses cane for Brawny/hardened       [] [place] hump-dorsum        [] Rash        [x] Reddness  [] Crusted/bumpy       [x] Firm edema     [] Loose, lobular     []  Soft, doughy  Other Symptoms:  [] Hyperkeratosis        [] Hyperplasia             [] Hyperpigmentation   [] Skin breakdown with lymphorrhea             [] Recent cellulitis    [x] Fibrosis  RUE/chest wall      [] Papillomatosis        [] fatty lobules        [] Elephantiasis  [] Truncal/abdominal swelling [x] Chest/axillary swelling     [] Genital swelling  [] Impaired ROM       [] Impaired mobility           [] Other  [] Stemmers sign:         [] Scars:                     [] Thick/rigid  [] Raised   [] Flattened   [] Softened  [] Mobility of scar:      [] Poor           [] Fair        [] Good         [] Normal       [] Other:    Wounds:  [] Shallow, painlful venous   [] skin denudement (top layer removed by moisture)  [] Radiation burns/ulcers     [] Superficial abrasions  [] Excoriation (compulsive picking) [] Being addressed by Laura Lawson        Color  [] Normal [] Mottled [x] Flushed/erythema    [] Other/Description  Location of problem area/s: RUE, chest wall, R lateral neck, R axilla  Edema  Edema Location:  [] 1+ Edema [] 2+ Edema  [x] 3+ Edema [] 4+ Edema  Edema Location:  [] 1+ Edema [] 2+ Edema  [] 3+ Edema [] 4+ Edema  Edema Location:  [] 1+ Edema [] 2+ Edema  [] 3+ Edema [] 4+ Edema    When did swelling start? Recently  What are the potential triggers for swelling?   Aggravating factors: significant progression of the disease  Relieving factors: massage following today's visit, wearing compression sleeve    Subjective: \"I've been doing my massage many times a day\"  Objective:   [x] Measurement [x]Manual Lymph Drainage [] Bandaging [] Kinesiotaping  [x] Education  [x]Self Massage  []Self Bandaging  [] Exercise  [] Wound Care  [] Hygiene [] Elevation     Other:     Education Included:  [x] General Lymphedema Information  [x] Dos and Donts  [x] Self Massage  []Home Exercises [] Self Bandaging [] Kinesiotaping  []  Hygiene  Learner: [x] Patient [] Spouse  []Other  [x] Family-daughter  Method: [x] Verbal [x] Demonstration [] Handouts  [] Exercise Booklet  Response:  [x] Verbalized understanding [x] Demonstrated understanding      [] Needs assist            [] No understanding    Physical Status:  Range of motion: Deficits [] Yes [x] No       Comment:  Strength:       Deficits [] Yes [x] No        Comment:  Sensation:       Deficits [] Yes [x] No        Comment:  Transfer:       Deficits [] Yes [x] No        Comment:  Ambulation:       Deficits [x] Yes [] No        Comment: uses cane for support  Functional Status:  Self Care:   [x] Independent [] Needs Assist [] Dependent   Home Maintenance:    [] Independent [x] Needs Assist [] Dependent  Comments:  Fatigue:   [] None identified [x] Minimum  [] Moderate [] Significant  Comments:  Psychosocial Status:  [x] Confident, independent  [] limited ADLs  [] stress, isolation, fear of future [] sense of loss of control over life  AAO x 3:             [x] Yes [] No        Comment: memory loss, looks to daughter to answer questions, requires frequent repetition of instructions  Domestic Concern: none noted, supportive daughters assists as needed  Suggested Professional Referral:    Measurements Upper Extremity  Measurements are made in 4 cm increments and are circumferential. Fingers are measured at base. CM Right Left   20 29    16 31    12 31.5    8 31.5    4 29    Olecranon 26    20 24.5    16 23.5    12 21.5    8 20    4 17.5    Wrist 16.5    Dorsum 20    SF 5    RF 5    MF 6    IF 6    TH 6.5    Total 340.5          Assessment  Knowledge of home program:  [] Good [x] Fair  [] Poor  Family can assist with programming: [] Yes  [x] No  Other:   Instructions for patient:   [x] Verbal [x] Demonstration [] Written  Instructions addressed: pt arrived with daughter for re-check to address swelling of RUE and chest wall.   RLE

## 2018-09-19 ENCOUNTER — HOSPITAL ENCOUNTER (OUTPATIENT)
Dept: OCCUPATIONAL THERAPY | Age: 83
Setting detail: THERAPIES SERIES
Discharge: HOME | End: 2018-09-19
Payer: MEDICARE

## 2018-09-19 PROCEDURE — G8993 SUB PT/OT CURRENT STATUS: HCPCS

## 2018-09-19 PROCEDURE — G8994 SUB PT/OT GOAL STATUS: HCPCS

## 2018-09-19 PROCEDURE — 97140 MANUAL THERAPY 1/> REGIONS: CPT

## 2018-09-19 NOTE — PROGRESS NOTES
[] Anitra Wilson                                       [x]With alternative compression:Jaqueline lite compression sleeve in place on RUE  [] Kinesiotaped  [] R. Hand                      [] Left Hand   [] R. Arm                        [] Left Arm   [] R. Leg             [] Left Leg                         [] R.  Breast                    [] L Breast                        []Right Upper Back    [] L Upper Back      [] Other:     Skin Integrity/Appearance- location: R axilla, chest wall, R lateral neck  [] Normal           [] Dry                  []Moist/weeping/blisters     [x] Warmth/mild inflamation  [] Brawny/hardened       [] [place] hump-dorsum        [] Rash        [x] Reddness  [] Crusted/bumpy       [x] Firm edema     [] Loose, lobular     []  Soft, doughy  Other Symptoms:  [] Hyperkeratosis           [] Hyperplasia             [] Hyperpigmentation   [] Skin breakdown with lymphorrhea             [] Recent cellulitis              [x] Fibrosis  RUE/chest wall        [] Papillomatosis        [] fatty lobules        [] Elephantiasis  [] Truncal/abdominal swelling   [x] Chest/axillary swelling     [] Genital swelling  [] Impaired ROM       [] Impaired mobility           [] Other  [] Stemmers sign:         [] Scars:                     [] Thick/rigid  [] Raised   [] Flattened   [] Softened  [] Mobility of scar:      [] Poor           [] Fair        [] Good         [] Normal       [] Other:    Wounds:  [] Shallow, painlful venous   [] skin denudement (top layer removed by moisture)  [] Radiation burns/ulcers     [] Superficial abrasions  [] Excoriation (compulsive picking) [] Being addressed by Laura Lawson                                                    Color  [] Normal           [] Mottled           [x] Flushed/erythema      [] Other/Description  Location of problem area/s: RUE, chest wall, R lateral neck, R axilla  Edema  Edema Location:  [] 1+ Edema       [] 2+ Edema        [x] 3+ Edema      [] 4+ Edema  Edema

## 2018-09-21 ENCOUNTER — HOSPITAL ENCOUNTER (OUTPATIENT)
Dept: OCCUPATIONAL THERAPY | Age: 83
Setting detail: THERAPIES SERIES
End: 2018-09-21
Payer: MEDICARE

## 2018-09-24 ENCOUNTER — HOSPITAL ENCOUNTER (OUTPATIENT)
Dept: OCCUPATIONAL THERAPY | Age: 83
Setting detail: THERAPIES SERIES
Discharge: HOME | End: 2018-09-24
Payer: MEDICARE

## 2018-09-24 PROCEDURE — 97140 MANUAL THERAPY 1/> REGIONS: CPT

## 2018-09-24 NOTE — PROGRESS NOTES
[facility]  Lymphedema Services  Daily Progress Note     Date: 18  Patients Name: Sol Hallman  :34  Gender: female  [facility]:[id number]  Insurance: Medicare, Dee Nevarez supplement-BMN  Referring Practitioner: Shai Singer MD  Time:8:00-9:00  Visit number:6     Treatment Charges:     Minutes      Units      Evaluation              Low              Moderate              High          Manual Therapy 60 4      Therapeutic Activity          Other               Total Treatment Time    60 4      Daily Charge: $   90.17  Previous Total:  609.19  Current Total:$  699.36     Patient is a _80_year old []male [x]female referred to the Lymphedema Clinic with a diagnosis of _RUE and UOQ of R chest wall___Lymphedema d/t Advanced Breast Cancer at the request of Dr Shai Singer. Pt diagnosed with R breast CA in  with a mild progression of the metastatic disease around the R breast since that time. Pt has been treated exclusively through homeopathic and natural means which is contrary to recommendations by typical allopathic medicine. Pt has advanced intraductal carcinoma with progression that involves enlargement of lymph nodes in the supraclavicular fossa and axillary region. Pt lives with supportive spouse, uses cane for stability during ambulation. Pt is independent with ADLs.            ICD 10 Code:  [x] I89.0   Secondary lymphedema, not otherwise classified  [] Q82.0  Primary lymphedema (congenital) including lymphedema tarda (> 36yo)  [] I97.2   Secondary lymphedema d/t postmastectomy  [] I87.2/L97.929(L)  CVI with 6 mo non-healing venous stasis ulcer  [] I87.2/L97.919(R) CVI with 6 mo non-healing venous stasis ulcer  [] E88.2 lipomatosis NOS   [x] C50.911 Malignant neoplasm of R breast     Restrictions/Precautions:   [x] No blood pressure/blood draw in: [] Left Upper Extremity [x] Right Upper Extremity      [x] Fall Risk       [x] No               [] Yes Intervention: uses cane for support  []Other:     Pain:  [] No    [x] Yes  Location:R breast and axilla region  Pain Rating (0-10 pain scale): 6/10 currently, but fluctuates at times to be much greater  Pain Description:  [] Achy,worse at end of day  [] distention, discomfort  [x] painful with palpation  [] guarding, tingling, numbness      [] Hypersensitivity    [] Radiation fibrosis  Interruption of Treatment [] Yes  [x] No     Medications:  [x]See charted information     Past Medical History:  [x] See charted information  Polyarthritis, systemic lupus erythematosus, degenerative disc disease and scoliosis, major depressive disorder     Conservative Treatments Utilized in the Past: none  [] Compression Garments         [] Bandaging      [] Elevation         [] Exercise      []Self MLD  Frequency/Duration:     Lymphedema Contraindication Assessment:  [] CHF                            []DVT     []Kidney Problems  []Cellulitis (warmth, tenderness, less defined erythematous borders)   [] Erysipelas (fiery/red plague, raised defined borders)             Clearance needed:     []Yes                  [x]No                    [] Obtained  Physician giving Clearance:     Cancer history             [x]Yes                  []No  Location:R breast  When Diagnosed: 2014  Surgery:                      []Yes                  [x]No                    Details:none, pt declined all traditional intevention  Node Dissection:         []Yes                  [x]No                    Where removed:  Chemotherapy:           []Yes                  [x]No                    []Pending           []In progress      []Completed  Radiation:                    []Yes                  [x]No                    []Pending           []In progress      []Completed  Comments: pt using homeopathic treatment only  Came to Clinic  [] Bandaged                                        []Unbandaged                        [] With [facility] [x] With Sleeve -Jaqueline Lite comrpression sleeve in place on RUE                        [] [facility]                                       []With alternative compression:  [] Kinesiotaped  [] R. Hand                      [] Left Hand   [] R. Arm                        [] Left Arm   [] R. Leg             [] Left Leg                         [] R.  Breast                    [] L Breast                        []Right Upper Back    [] L Upper Back      [] Other:     Skin Integrity/Appearance- location: R axilla, chest wall, R lateral neck  [] Normal           [] Dry                  []Moist/weeping/blisters     [x] Warmth/mild inflamation  [] Brawny/hardened       [] [place] hump-dorsum        [] Rash        [x] Reddness  [] Crusted/bumpy       [x] Firm edema     [] Loose, lobular     []  Soft, doughy  Other Symptoms:  [] Hyperkeratosis           [] Hyperplasia             [] Hyperpigmentation   [] Skin breakdown with lymphorrhea, neck             [] Recent cellulitis              [x] Fibrosis  RUE/chest wall        [] Papillomatosis        [] fatty lobules        [] Elephantiasis  [] Truncal/abdominal swelling   [x] Chest/axillary swelling     [] Genital swelling  [] Impaired ROM       [] Impaired mobility           [] Other  [] Stemmers sign:         [] Scars:                     [] Thick/rigid  [] Raised   [] Flattened   [] Softened  [] Mobility of scar:      [] Poor           [] Fair        [] Good         [] Normal       [] Other:    Wounds:  [] Shallow, painlful venous   [] skin denudement (top layer removed by moisture)  [] Radiation burns/ulcers     [] Superficial abrasions  [] Excoriation (compulsive picking) [] Being addressed by Laura Lawson                                                    Color  [] Normal           [] Mottled           [x] Flushed/erythema      [] Other/Description  Location of problem area/s: RUE, chest wall, R lateral neck, R axilla  Edema  Edema Location:  [] 1+ Edema       [] Needs Assist  [] Dependent  Comments:  Fatigue:                        [] None identified          [x] Minimum        [] Moderate        [] Significant  Comments:  Psychosocial Status:  [x] Confident, independent          [] limited ADLs   [] stress, isolation, fear of future          [] sense of loss of control over life  AAO x 3:                     [x] Yes [] No                       Comment: memory loss, looks to daughter to answer questions, requires frequent repetition of instructions  Domestic Concern: none noted, supportive daughters assists as needed  Suggested Professional Referral:     Measurements Upper Extremity  Measurements are made in 4 cm increments and are circumferential. Fingers are measured at base. CM Right Left   20 31.5     16 29.5     12 30.5     8 29.5     4 27.5     Olecranon 24.5     20 23     16 22     12 19     8 17     4 16     Wrist 16     Dorsum 19     SF 5     RF 5     MF 5.5     IF 6     TH 6     Total 332. 5              Assessment  Knowledge of home program:             [] Good [x] Fair                 [] Poor  Family can assist with programming: [] Yes                 [x] No  Other:   Instructions for patient:                       [x] Verbal [x] Demonstration           [] Written  Instructions addressed: Pt arrived with , who remained in waiting room. R sleeve was worn. Patient expresses wearing sleeve during the day and wears the TG  at night time. Patient expresses R UE feeling better but armpit and towards midline feels most tight/pain. RLE examined, measured. A small swelling reduction of +2.5 cm noted. Fibrosis continues at posterior R upper arm, axilla, chest wall and supraclavicular fossa of lateral R neck and abdomen on R mid lateral aspect. R axillary lymph nodes have reduced mildly in size, softened mildly and reduced pain to touch, lymph node group at R side of neck remains engorged but softens mildly after MLD.   R breast and chest wall, mid abdomin seen 1-3 times a week to address:  [x] 1) Patient will be educated and express understanding of causative factors for Lymphedema, prevention of exacerbations, skin care, exercise, self massage, self bandaging (if indicated), and compression garment application (if indicated). STG: Every treatment  LT__ weeks  [x] 2) Patient or caregiver will consistently follow home programming instructions from appointment to appointment including bandaging, self massage, exercise or any additional intervention recommended. STG:_6_ weeks   LTG: _12__ weeks  [x] 3) Patient will realize limb reduction as evidenced by decrease in limb measurements. STG: 3% reduction__6_ weeks   LTG: __5%__ reduction _12__ weeks  [x] 4). Patient will be referred for fitting of a compression garment or alternative compression when maximum results are achieved. (Indication are no further changes in numerical status or changes in tissue integrity.) LTG: _12_ weeks  [] 5). Pt will increase his/her level of function as demonstrated by increased ability to _____________.  LTG: ___ weeks  [] 6). _________ STG: __ weeks   LTG: __ weeks     Plan: Continue to address:     []Bandaging  [] Self Bandaging/Family Assist  [x]MLD  [x]Self Massage  []Exercise  [x]Education  [x]Obtain referral for garments  []Medical Hold  []Discharge to Home Program     G-Codes:    [x] Eval _18___ [] 10th visit____  []  visit ____ []  visit____  [] D/C ____      Functional Limitation: 34/72, 47%  Functional Assessment Used: Lymphedema Life Impact Scale (LLIS)  Current Status Modifier: CK  Goal Status Modifier: CJ  Discharge Status Modifier:     Physican signature is required for :  [] Plan of Care  [x]Medicare Requirement     Jo Ann Garcia MOT, OTR/L, CLT

## 2018-09-25 ENCOUNTER — HOSPITAL ENCOUNTER (OUTPATIENT)
Dept: OCCUPATIONAL THERAPY | Age: 83
Setting detail: THERAPIES SERIES
Discharge: HOME | End: 2018-09-25
Payer: MEDICARE

## 2018-09-25 NOTE — PROGRESS NOTES
[facility]  Lymphedema Services  Daily Progress Note     Date: 18  Patients Name: Aries Shelton  :34  Gender: female  [facility]:[id number]  Insurance: Medicare, Andra Arabella supplement-BMN  Referring Practitioner: Fox Davenport MD  Time:3-4:00  Visit number:6     Treatment Charges:     Minutes      Units      Evaluation              Low              Moderate              High          Manual Therapy 60 4      Therapeutic Activity          Other               Total Treatment Time    60 4      Daily Charge: $   90.17  Previous Total:  609.19  Current Total:$  699.36     Patient is a _80_year old []male [x]female referred to the Lymphedema Clinic with a diagnosis of _RUE and UOQ of R chest wall___Lymphedema d/t Advanced Breast Cancer at the request of Dr Fox Davenport. Pt diagnosed with R breast CA in  with a mild progression of the metastatic disease around the R breast since that time. Pt has been treated exclusively through homeopathic and natural means which is contrary to recommendations by typical allopathic medicine. Pt has advanced intraductal carcinoma with progression that involves enlargement of lymph nodes in the supraclavicular fossa and axillary region. Pt lives with supportive spouse, uses cane for stability during ambulation. Pt is independent with ADLs.            ICD 10 Code:  [x] I89.0   Secondary lymphedema, not otherwise classified  [] Q82.0  Primary lymphedema (congenital) including lymphedema tarda (> 34yo)  [] I97.2   Secondary lymphedema d/t postmastectomy  [] I87.2/L97.929(L)  CVI with 6 mo non-healing venous stasis ulcer  [] I87.2/L97.919(R) CVI with 6 mo non-healing venous stasis ulcer  [] E88.2 lipomatosis NOS   [x] C50.911 Malignant neoplasm of R breast     Restrictions/Precautions:   [x] No blood pressure/blood draw in: [] Left Upper Extremity [x] Right Upper Extremity      [x] Fall Risk       [x] No               [] Yes [x] With Sleeve -Jaqueline Lite comrpression sleeve in place on RUE                        [] [facility]                                       []With alternative compression:  [] Kinesiotaped  [] R. Hand                      [] Left Hand   [] R. Arm                        [] Left Arm   [] R. Leg             [] Left Leg                         [] R.  Breast                    [] L Breast                        []Right Upper Back    [] L Upper Back      [] Other:     Skin Integrity/Appearance- location: R axilla, chest wall, R lateral neck  [] Normal           [] Dry                  []Moist/weeping/blisters     [x] Warmth/mild inflamation  [] Brawny/hardened       [] [place] hump-dorsum        [] Rash        [x] Reddness  [] Crusted/bumpy       [x] Firm edema     [] Loose, lobular     []  Soft, doughy  Other Symptoms:  [] Hyperkeratosis           [] Hyperplasia             [] Hyperpigmentation   [] Skin breakdown with lymphorrhea, neck             [] Recent cellulitis              [x] Fibrosis  RUE/chest wall        [] Papillomatosis        [] fatty lobules        [] Elephantiasis  [] Truncal/abdominal swelling   [x] Chest/axillary swelling     [] Genital swelling  [] Impaired ROM       [] Impaired mobility           [] Other  [] Stemmers sign:         [] Scars:                     [] Thick/rigid  [] Raised   [] Flattened   [] Softened  [] Mobility of scar:      [] Poor           [] Fair        [] Good         [] Normal       [] Other:    Wounds:  [] Shallow, painlful venous   [] skin denudement (top layer removed by moisture)  [] Radiation burns/ulcers     [] Superficial abrasions  [] Excoriation (compulsive picking) [] Being addressed by Laura Lawson                                                    Color  [] Normal           [] Mottled           [x] Flushed/erythema      [] Other/Description  Location of problem area/s: RUE, chest wall, R lateral neck, R axilla  Edema  Edema Location:  [] 1+ Edema       [] 2+ Edema        [x] 3+ Edema      [] 4+ Edema  Edema Location:  [] 1+ Edema       [] 2+ Edema        [] 3+ Edema      [] 4+ Edema  Edema Location:  [] 1+ Edema       [] 2+ Edema        [] 3+ Edema      [] 4+ Edema     When did swelling start? Recently  What are the potential triggers for swelling? Aggravating factors: significant progression of the disease  Relieving factors: massage following today's visit, wearing compression sleeve     Subjective: \"Chest feels most tight in the morning. \"  Objective:   [x] Measurement [x]Manual Lymph Drainage        [] Bandaging      [] Kinesiotaping  [x] Education                   [x]Self Massage              []Self Bandaging  [] Exercise                     [] Wound Care               [] Hygiene          [] Elevation     Other:      Education Included:  [x] General Lymphedema Information                [x] Dos and Donts          [x] Self Massage  []Home Exercises          [] Self Bandaging          [] Kinesiotaping             []  Hygiene  Learner: [x] Patient         [] Spouse                       []Other               [] Family  Method: [x] Verbal          [x] Demonstration           [] Handouts                    [] Exercise Booklet  Response:       [x] Verbalized understanding      [x] Demonstrated understanding                            [] Needs assist              [] No understanding     Physical Status:  Range of motion: Deficits [] Yes [x] No                            Comment:  Strength:              Deficits [] Yes [x] No                            Comment:  Sensation:            Deficits [] Yes [x] No                            Comment:  Transfer:               Deficits [] Yes [x] No                            Comment:  Ambulation:          Deficits [x] Yes [] No                            Comment: uses cane for support  Functional Status:  Self Care:                     [x] Independent  [] Needs Assist  [] Dependent   Home Maintenance:    [] Independent  [x] are erythematic, very firm and extremely fibrotic. Thorough MLD performed to neck, shoulders, R axilla/shoulder, lateral torso, inguinal nodes, scapular region, chest wall and abdomin. Techniques reviewed for pt to perform self MLD 2-3x/day to RUE, E axilla, R lateral neck, R breast, and chest wall. Pt demo fair to fair + understanding of concepts, verbal and written instructions reviewed each visit, and are kept simple for pt to follow in home setting. [facility] reports that pt must wear compression sleeve for 4 wks (from Sept 17-Oct 8), showing good compliance with progress noted. Pt will then use basic compression pump for 30 days before being eligible for the flexitouch compression pump. Pt appeared to have a good understanding of the information presented and the plan recommended. All subsequent appointments are to be scheduled in [place] to reduce chance of pt confusion. Pt pleased with therapy services. OT to follow.     Compression Garment recommendation for medical management: Jaqueline Lite compression sleeve, size medium/long  [x] 20/30 mmHg    [] 30/40 mmHg   [] 40/50 mmHg    [] 50/60 mmHg  Comments:Trinh ordering through her work.     Lymphedema Stage: Per ISL Guidelines  [] 0 - Subclinical with no evidence on physical exam  [] 1 - Early Stage, swelling and pitting edema, subsides with limb elevation  [x] 2 - More advanced, fibrosis resulting in non-pitting edema, does not respond to elevation, thickening of the tissues  [] 3 - Elephantiasis, pitting absent, huge limbs with dry, scaly, blistered, warty skin changes, fluid may be leaking with skin infections common.  Acanthosis (deep body folds).      Rehabilitation Potential: [] Good           [x] Fair                 [] Poor  Comment: mild/moderate memory loss noted, pt requires extra time and multiple reminders  Response to treatment:pt receptive to education  Patients Goal:reduce swelling and pain     Therapy Goals  Pt will be seen 1-3 times a week to address:  [x] 1) Patient will be educated and express understanding of causative factors for Lymphedema, prevention of exacerbations, skin care, exercise, self massage, self bandaging (if indicated), and compression garment application (if indicated). STG: Every treatment  LT__ weeks  [x] 2) Patient or caregiver will consistently follow home programming instructions from appointment to appointment including bandaging, self massage, exercise or any additional intervention recommended. STG:_6_ weeks   LTG: _12__ weeks  [x] 3) Patient will realize limb reduction as evidenced by decrease in limb measurements. STG: 3% reduction__6_ weeks   LTG: __5%__ reduction _12__ weeks  [x] 4). Patient will be referred for fitting of a compression garment or alternative compression when maximum results are achieved. (Indication are no further changes in numerical status or changes in tissue integrity.) LTG: _12_ weeks  [] 5). Pt will increase his/her level of function as demonstrated by increased ability to _____________.  LTG: ___ weeks  [] 6). _________ STG: __ weeks   LTG: __ weeks     Plan: Continue to address:     []Bandaging  [] Self Bandaging/Family Assist  [x]MLD  [x]Self Massage  []Exercise  [x]Education  [x]Obtain referral for garments  []Medical Hold  []Discharge to Home Program     G-Codes:    [x] Eval _18___ [] 10th visit____  []  visit ____ []  visit____  [] D/C ____      Functional Limitation: 34/72, 47%  Functional Assessment Used: Lymphedema Life Impact Scale (LLIS)  Current Status Modifier: CK  Goal Status Modifier: CJ  Discharge Status Modifier:     Physican signature is required for :  [] Plan of Care  [x]Medicare Requirement     Jo Ann Garcia MOT, OTR/L, CLT

## 2018-09-26 ENCOUNTER — HOSPITAL ENCOUNTER (OUTPATIENT)
Dept: OCCUPATIONAL THERAPY | Age: 83
Setting detail: THERAPIES SERIES
End: 2018-09-26
Payer: MEDICARE

## 2018-09-27 ENCOUNTER — HOSPITAL ENCOUNTER (OUTPATIENT)
Dept: OCCUPATIONAL THERAPY | Age: 83
Setting detail: THERAPIES SERIES
Discharge: HOME | End: 2018-09-27
Payer: MEDICARE

## 2018-10-01 ENCOUNTER — HOSPITAL ENCOUNTER (OUTPATIENT)
Dept: OCCUPATIONAL THERAPY | Age: 83
Setting detail: THERAPIES SERIES
Discharge: HOME | End: 2018-10-01
Payer: MEDICARE

## 2018-10-01 PROCEDURE — 97140 MANUAL THERAPY 1/> REGIONS: CPT

## 2018-10-03 ENCOUNTER — HOSPITAL ENCOUNTER (OUTPATIENT)
Dept: OCCUPATIONAL THERAPY | Age: 83
Setting detail: THERAPIES SERIES
Discharge: HOME | End: 2018-10-03
Payer: MEDICARE

## 2018-10-03 PROCEDURE — 97140 MANUAL THERAPY 1/> REGIONS: CPT

## 2018-10-03 NOTE — PROGRESS NOTES
[facility]  Lymphedema Services  Daily Progress Note     Date: 10/3/18  Patients Name: Barb Tanner  :34  Gender: female  AV  Insurance: Medicare, AARP supplement-BMN  Referring Gadiel Marie MD  Time: [id number]  Visit number:8     Treatment Charges:     Minutes      Units      Evaluation              Low              Moderate              High          Manual Therapy 55 4      Therapeutic Activity          Other               Total Treatment Time    55 4      Daily Charge: $   90.17  Previous Total:  789.53  Current Total:$  879.70     Patient is a _80_year old []male [x]female referred to the Lymphedema Clinic with a diagnosis of _RUE and UOQ of R chest wall Lymphedema d/t Advanced Breast Cancer at the request of Dr Gallo Renner.  Pt diagnosed with R breast CA in  with a mild progression of the metastatic disease around the R breast since that time.  Pt has been treated exclusively through homeopathic and natural means which is contrary to recommendations by typical allopathic medicine.  Pt has advanced intraductal carcinoma with progression that involves enlargement of lymph nodes in the supraclavicular fossa and axillary region.   Pt lives with supportive spouse, uses cane for stability during ambulation.  Pt is independent with ADLs.            ICD 10 Code:  [x] I89.0   Secondary lymphedema, not otherwise classified  [] Q82.0  Primary lymphedema (congenital) including lymphedema tarda (> 34yo)  [] I97.2   Secondary lymphedema d/t postmastectomy  [] I87.2/L97.929(L)  CVI with 6 mo non-healing venous stasis ulcer  [] I87.2/L97.919(R) CVI with 6 mo non-healing venous stasis ulcer  [] E88.2 lipomatosis NOS   [x] C50.911 Malignant neoplasm of R breast     Restrictions/Precautions:   [x] No blood pressure/blood draw in: [] Left Upper Extremity [x] Right Upper Extremity      [x] Fall Risk       [x] No

## 2018-10-05 ENCOUNTER — HOSPITAL ENCOUNTER (OUTPATIENT)
Dept: OCCUPATIONAL THERAPY | Age: 83
Setting detail: THERAPIES SERIES
Discharge: HOME | End: 2018-10-05
Payer: MEDICARE

## 2018-10-05 PROCEDURE — 97140 MANUAL THERAPY 1/> REGIONS: CPT

## 2018-10-05 NOTE — PROGRESS NOTES
[facility]  Lymphedema Services  Daily Progress Note     Date: 10/5/18  Patients Name: Barb Tanner  :34  Gender: female  LTB:[id number]  Julia Washoe supplement-BMN  Referring Yahaira Escobedo MD  Time: 1:00-1:55  Visit number:9     Treatment Charges:     Minutes      Units      Evaluation              Low              Moderate              High          Manual Therapy 55 4      Therapeutic Activity          Other               Total Treatment Time    55 4      Daily Charge: $    90.17  Previous Total:  879.70  Current Total:$  348.15     Patient is a _80_year old []male [x]female referred to the Lymphedema Clinic with a diagnosis of _RUE and UOQ of R chest wall Lymphedema d/t Advanced Breast Cancer at the request of Dr Chad Pollock.  Pt diagnosed with R breast CA in  with a mild progression of the metastatic disease around the R breast since that time.  Pt has been treated exclusively through homeopathic and natural means which is contrary to recommendations by typical allopathic medicine.  Pt has advanced intraductal carcinoma with progression that involves enlargement of lymph nodes in the supraclavicular fossa and axillary region.   Pt lives with supportive spouse, uses cane for stability during ambulation.  Pt is independent with ADLs.            ICD 10 Code:  [x] I89.0   Secondary lymphedema, not otherwise classified  [] Q82.0  Primary lymphedema (congenital) including lymphedema tarda (> 34yo)  [] I97.2   Secondary lymphedema d/t postmastectomy  [] I87.2/L97.929(L)  CVI with 6 mo non-healing venous stasis ulcer  [] I87.2/L97.919(R) CVI with 6 mo non-healing venous stasis ulcer  [] E88.2 lipomatosis NOS   [x] C50.911 Malignant neoplasm of R breast     Restrictions/Precautions:   [x] No blood pressure/blood draw in: [] Left Upper Extremity [x] Right Upper Extremity      [x] Fall Risk       [x] No               [] Yes                 Intervention: uses cane for support at times  []Other:     Pain:  [x] No    [] Yes  Location:R breast and axilla region  Pain Rating (0-10 pain scale):  Pain Description:  [] Achy,worse at end of day  [] distention, discomfort  [] sensitive with palpation  [] guarding, tingling, numbness      [] Hypersensitivity    [] Radiation fibrosis  Interruption of Treatment [] Yes  [x] No     Medications:  [x]See charted information     Past Medical History:  [x] See charted information  Polyarthritis, systemic lupus erythematosus, degenerative disc disease and scoliosis, major depressive disorder     Conservative Treatments Utilized in the Past:  none  [] Compression Garments         [] Bandaging      [] Elevation         [] Exercise      []Self MLD  Frequency/Duration:     Lymphedema Contraindication Assessment:  [] CHF                            []DVT     []Kidney Problems  []Cellulitis (warmth, tenderness, less defined erythematous borders)   [] Erysipelas (fiery/red plague, raised defined borders)             Clearance needed:     []Yes                  [x]No                    [] Obtained  Physician giving Clearance:     Cancer history             [x]Yes                  []No  Location:R breast  When Diagnosed: 2014  Surgery:                      []Yes                  [x]No                    Details:none, pt declined all traditional intevention  Node Dissection:         []Yes                  [x]No                    Where removed:  Chemotherapy:           []Yes                  [x]No                    []Pending           []In progress      []Completed  Radiation:                    []Yes                  [x]No                    []Pending           []In progress      []Completed  Comments: pt using homeopathic treatment only  Came to Clinic  [] Bandaged                                        []Unbandaged                        [] With Stocking

## 2018-10-09 ENCOUNTER — HOSPITAL ENCOUNTER (OUTPATIENT)
Dept: OCCUPATIONAL THERAPY | Age: 83
Setting detail: THERAPIES SERIES
Discharge: HOME | End: 2018-10-09
Payer: MEDICARE

## 2018-10-09 PROCEDURE — 97140 MANUAL THERAPY 1/> REGIONS: CPT

## 2018-10-10 ENCOUNTER — HOSPITAL ENCOUNTER (OUTPATIENT)
Dept: OCCUPATIONAL THERAPY | Age: 83
Setting detail: THERAPIES SERIES
Discharge: HOME | End: 2018-10-10
Payer: MEDICARE

## 2018-10-10 PROCEDURE — G8987 SELF CARE CURRENT STATUS: HCPCS

## 2018-10-10 PROCEDURE — G8988 SELF CARE GOAL STATUS: HCPCS

## 2018-10-10 PROCEDURE — 97140 MANUAL THERAPY 1/> REGIONS: CPT

## 2018-10-10 NOTE — PROGRESS NOTES
[facility]  Lymphedema Services  Daily Progress Note     Date: 10/9/18  Patients Name: Barb Tanner  :34  Gender: female  NORIS:[id number]  Mariya Sweet supplement-BMN  Referring Kelley Zaragoza MD  Time: 3:05-4:05  Visit number:10     Treatment Charges:     Minutes      Units      Evaluation              Low              Moderate              High          Manual Therapy 55 4      Therapeutic Activity          Other               Total Treatment Time    55 4      Daily Charge: $     90.17  Previous Total:   769.54  Current Total:$ 1060.04     Patient is a _80_year old []male [x]female referred to the Lymphedema Clinic with a diagnosis of _RUE and UOQ of R chest wall Lymphedema d/t Advanced Breast Cancer at the request of Dr Kyle Finn.  Pt diagnosed with R breast CA in  with a mild progression of the metastatic disease around the R breast since that time.  Pt has been treated exclusively through homeopathic and natural means which is contrary to recommendations by typical allopathic medicine.  Pt has advanced intraductal carcinoma with progression that involves enlargement of lymph nodes in the supraclavicular fossa and axillary region.   Pt lives with supportive spouse, uses cane for stability during ambulation.  Pt is independent with ADLs.            ICD 10 Code:  [x] I89.0   Secondary lymphedema, not otherwise classified  [] Q82.0  Primary lymphedema (congenital) including lymphedema tarda (> 36yo)  [] I97.2   Secondary lymphedema d/t postmastectomy  [] I87.2/L97.929(L)  CVI with 6 mo non-healing venous stasis ulcer  [] I87.2/L97.919(R) CVI with 6 mo non-healing venous stasis ulcer  [] E88.2 lipomatosis NOS   [x] C50.911 Malignant neoplasm of R breast     Restrictions/Precautions:   [x] No blood pressure/blood draw in: [] Left Upper Extremity [x] Right Upper Extremity      [x] Fall Risk       [x] No

## 2018-10-10 NOTE — PROGRESS NOTES
[] Yes                 Intervention: uses cane for support at times  []Other:     Pain:  [x] No    [] Yes  Location:R breast and axilla region  Pain Rating (0-10 pain scale):  Pain Description:  [] Achy,worse at end of day  [] distention, discomfort  [] sensitive with palpation  [] guarding, tingling, numbness      [] Hypersensitivity    [] Radiation fibrosis  Interruption of Treatment [] Yes  [x] No     Medications:  [x]See charted information     Past Medical History:  [x] See charted information  Polyarthritis, systemic lupus erythematosus, degenerative disc disease and scoliosis, major depressive disorder     Conservative Treatments Utilized in the Past:  none  [] Compression Garments         [] Bandaging      [] Elevation         [] Exercise      []Self MLD  Frequency/Duration:     Lymphedema Contraindication Assessment:  [] CHF                            []DVT     []Kidney Problems  []Cellulitis (warmth, tenderness, less defined erythematous borders)   [] Erysipelas (fiery/red plague, raised defined borders)             Clearance needed:     []Yes                  [x]No                    [] Obtained  Physician giving Clearance:     Cancer history             [x]Yes                  []No  Location:R breast  When Diagnosed: 2014  Surgery:                      []Yes                  [x]No                    Details:none, pt declined all traditional intevention  Node Dissection:         []Yes                  [x]No                    Where removed:  Chemotherapy:           []Yes                  [x]No                    []Pending           []In progress      []Completed  Radiation:                    []Yes                  [x]No                    []Pending           []In progress      []Completed  Comments: pt using homeopathic treatment only  Came to Clinic  [] Bandaged                                        []Unbandaged                        [] With Stocking                                  [x] With Sleeve -compression sleeve to RUE                     [] Unna Boot                                       []With alternative compression:  [] Kinesiotaped  [] R. Hand                      [] Left Hand   [] R. Arm                        [] Left Arm   [] R. Leg             [] Left Leg                         [] R.  Breast                    [] L Breast                        []Right Upper Back    [] L Upper Back      [] Other:     Skin Integrity/Appearance- location: R axilla, chest wall, R lateral neck  [] Normal           [] Dry                  []Moist/weeping/blisters     [x] Warmth/mild inflamation  [x] Brawny/hardened       [] Marin hump-dorsum        [] Rash        [x] Reddness  [] Crusted/bumpy       [x] Firm edema     [] Loose, lobular     []  Soft, doughy  Other Symptoms:  [] Hyperkeratosis           [] Hyperplasia             [] Hyperpigmentation   [] Skin breakdown with lymphorrhea, neck             [] Recent cellulitis              [x] Fibrosis  RUE/chest wall        [] Papillomatosis        [] fatty lobules        [] Elephantiasis  [] Truncal/abdominal swelling   [x] Chest/axillary swelling     [] Genital swelling  [] Impaired ROM       [] Impaired mobility           [] Other  [] Stemmers sign:         [] Scars:                     [] Thick/rigid  [] Raised   [] Flattened   [] Softened  [] Mobility of scar:      [] Poor           [] Fair        [] Good         [] Normal       [] Other:    Wounds:  [] Shallow, painlful venous   [] skin denudement (top layer removed by moisture)  [] Radiation burns/ulcers     [] Superficial abrasions  [] Excoriation (compulsive picking) [] Being addressed by Laura Lawson     Color  [] Normal           [] Mottled           [x] Flushed/erythema      [] Other/Description  Location of problem area/s: RUE, chest wall, R lateral neck, R axilla  Edema  Edema Location:  [] 1+ Edema       [] 2+ Edema        [x] 3+ Edema      [] 4+ Edema  Edema Location:  [] 1+ Edema       [] 2+ Edema        [] 3+ Edema      [] 4+ Edema  Edema Location:  [] 1+ Edema       [] 2+ Edema        [] 3+ Edema      [] 4+ Edema     When did swelling start? Following diagnosis of CA  What are the potential triggers for swelling? Aggravating factors: significant progression of the disease  Relieving factors: MLD, wearing compression sleeve     Subjective: \"Chest feels most tight in the morning. \"  Objective:   [x] Measurement [x]Manual Lymph Drainage        [] Bandaging      [] Kinesiotaping  [x] Education                   [x]Self Massage              []Self Bandaging  [] Exercise                     [] Wound Care               [] Hygiene          [] Elevation     Other:      Education Included:  [x] General Lymphedema Information                [x] Dos and Donts          [x] Self Massage  []Home Exercises          [] Self Bandaging          [] Kinesiotaping             []  Hygiene  Learner: [x] Patient         [] Spouse                       []Other               [] Family  Method: [x] Verbal          [x] Demonstration           [] Handouts                    [] Exercise Booklet  Response:       [x] Verbalized understanding      [x] Demonstrated understanding                            [] Needs assist              [] No understanding     Physical Status:  Range of motion: Deficits [] Yes [x] No                            CSVNQWF:  Strength:              Deficits [] Yes [x] No                            UFGRYJR:  Sensation:            Deficits [] Yes [x] No                            KYNMPOE:  Transfer:               Deficits [] Yes [x] No                            FXQKRDB:  Ambulation:          Deficits [x] Yes [] No                            Comment: uses cane for support  Functional Status:  Self Care:                     [x] Independent  [] Needs Assist  [] Dependent   Home Maintenance:    [] Independent  [x] Needs

## 2018-10-12 ENCOUNTER — HOSPITAL ENCOUNTER (OUTPATIENT)
Dept: OCCUPATIONAL THERAPY | Age: 83
Setting detail: THERAPIES SERIES
End: 2018-10-12
Payer: MEDICARE

## 2018-10-15 ENCOUNTER — HOSPITAL ENCOUNTER (OUTPATIENT)
Dept: OCCUPATIONAL THERAPY | Age: 83
Setting detail: THERAPIES SERIES
Discharge: HOME | End: 2018-10-15
Payer: MEDICARE

## 2018-10-15 PROCEDURE — 97140 MANUAL THERAPY 1/> REGIONS: CPT

## 2018-10-15 NOTE — PROGRESS NOTES
[] Yes                 Intervention: uses cane for support at times  []Other:     Pain:  [x] No    [] Yes  Location:R breast and axilla region  Pain Rating (0-10 pain scale):  Pain Description:  [] Achy,worse at end of day  [] distention, discomfort  [] sensitive with palpation  [] guarding, tingling, numbness      [] Hypersensitivity    [] Radiation fibrosis  Interruption of Treatment [] Yes  [x] No     Medications:  [x]See charted information     Past Medical History:  [x] See charted information  Polyarthritis, systemic lupus erythematosus, degenerative disc disease and scoliosis, major depressive disorder     Conservative Treatments Utilized in the Past:  none  [] Compression Garments         [] Bandaging      [] Elevation         [] Exercise      []Self MLD  Frequency/Duration:     Lymphedema Contraindication Assessment:  [] CHF                            []DVT     []Kidney Problems  []Cellulitis (warmth, tenderness, less defined erythematous borders)   [] Erysipelas (fiery/red plague, raised defined borders)             Clearance needed:     []Yes                  [x]No                    [] Obtained  Physician giving Clearance:     Cancer history             [x]Yes                  []No  Location:R breast  When Diagnosed: 2014  Surgery:                      []Yes                  [x]No                    Details:none, pt declined all traditional intevention  Node Dissection:         []Yes                  [x]No                    Where removed:  Chemotherapy:           []Yes                  [x]No                    []Pending           []In progress      []Completed  Radiation:                    []Yes                  [x]No                    []Pending           []In progress      []Completed  Comments: pt using homeopathic treatment only    Came to Clinic  [] Bandaged                                        []Unbandaged                        [] With Stocking Maintenance:    [] Independent  [x] Needs Assist  [] Dependent  Comments:  Fatigue:                        [] None identified          [x] Minimum        [] Moderate        [] Significant  Comments:  Psychosocial Status:  [x] Confident, independent          [] limited ADLs   [] stress, isolation, fear of future          [] sense of loss of control over life  AAO x 3:                     [x] Yes [] No                       Comment: memory loss, looks to daughter to answer questions, requires frequent repetition of instructions  Domestic Concern: none noted, supportive daughters assists as needed  Suggested Professional Referral:     Measurements Upper Extremity  Measurements are made in 4 cm increments and are circumferential. Fingers are measured at base. CM Right Left   20 29     16 28     12 29     8 29.5     4 28.5     Olecranon 24     20 24     16 22     12 19     8 17     4 15.5     Wrist 15.5     Dorsum 20.5     SF 5     RF 5     MF 6     IF 6     TH 6     Total 327. 5              Assessment  Knowledge of home program:             [] Good [x] Fair                 [] Poor  Family can assist with programming: [] Yes                 [x] No  Other:   Instructions for patient:                       [x] Verbal [x] Demonstration           [] Written  Instructions addressed: Pt arrived with  who remained in waiting room.  R axilla nodes remain raised and swollen. RLE examined, measured. Measurement's decreased 3cm since last treatment. Patient completed diaphragmatic breathing to stimulate lymph nodes of the parasternal lymph nodes. B axilla stimulated at lymph nodes with fair+ understanding. Patch test for kinesiotape donned at distal of R axilla and second patch test donned to medial of R axilla. Skin integrity noted as good at test sites. [facility] will kinesiotape patient next Wednesday at the 1pm appointment.      Fibrosis continues at posterior R upper arm and abdomen on R mid lateral aspect multiple reminders  Response to treatment:pt receptive to education  Patients Goal:reduce swelling and pain     Therapy Goals  Pt will be seen 1-3 times a week to address:  [x] 1) Patient will be educated and express understanding of causative factors for Lymphedema, prevention of exacerbations, skin care, exercise, self massage, self bandaging (if indicated), and compression garment application (if indicated). STG: Every treatment  LT__ weeks  [x] 2) Patient or caregiver will consistently follow home programming instructions from appointment to appointment including bandaging, self massage, exercise or any additional intervention recommended. STG:_6_ weeks   LTG: _12__ weeks  [x] 3) Patient will realize limb reduction as evidenced by decrease in limb measurements. STG: 3% reduction__6_ weeks   LTG: __5%__ reduction _12__ weeks  [x] 4). Patient will be referred for fitting of a compression garment or alternative compression when maximum results are achieved. (Indication are no further changes in numerical status or changes in tissue integrity.) LTG: _12_ weeks  [] 5). Pt will increase his/her level of function as demonstrated by increased ability to _____________.  LTG: ___ weeks  [] 6). _________ STG: __ weeks   LTG: __ weeks     Plan: Continue to address:     []Bandaging  [] Self Bandaging/Family Assist  [x]MLD  [x]Self Massage  []Exercise  [x]Education  [x]Obtain referral for garments  []Medical Hold  []Discharge to Home Program     G-Codes:    [x] Eval _18___ [x] 10th visit 10/10/18  []  visit ____ [] 30 visit____  [] D/C ____      Functional Limitation: EVAL 34/, 47%,  visit 41 56%  Functional Assessment Used: Lymphedema Life Impact Scale (LLIS)  Current Status Modifier: CK  Goal Status Modifier: CJ  Discharge Status Modifier:     Physican signature is required for :  [] Plan of Care  [x]Medicare Requirement     Aren SWEENEY, OTR/L, CLT

## 2018-10-17 ENCOUNTER — HOSPITAL ENCOUNTER (OUTPATIENT)
Dept: OCCUPATIONAL THERAPY | Age: 83
Setting detail: THERAPIES SERIES
Discharge: HOME | End: 2018-10-17
Payer: MEDICARE

## 2018-10-17 PROCEDURE — 97140 MANUAL THERAPY 1/> REGIONS: CPT

## 2018-10-17 NOTE — PROGRESS NOTES
[] Yes                 Intervention: uses cane for support at times  []Other:     Pain:  [x] No    [] Yes  Location:R breast and axilla region  Pain Rating (0-10 pain scale):  Pain Description:  [] Achy,worse at end of day  [] distention, discomfort  [] sensitive with palpation  [] guarding, tingling, numbness      [] Hypersensitivity    [] Radiation fibrosis  Interruption of Treatment [] Yes  [x] No     Medications:  [x]See charted information     Past Medical History:  [x] See charted information  Polyarthritis, systemic lupus erythematosus, degenerative disc disease and scoliosis, major depressive disorder     Conservative Treatments Utilized in the Past:  none  [] Compression Garments         [] Bandaging      [] Elevation         [] Exercise      []Self MLD  Frequency/Duration:     Lymphedema Contraindication Assessment:  [] CHF                            []DVT     []Kidney Problems  []Cellulitis (warmth, tenderness, less defined erythematous borders)   [] Erysipelas (fiery/red plague, raised defined borders)             Clearance needed:     []Yes                  [x]No                    [] Obtained  Physician giving Clearance:     Cancer history             [x]Yes                  []No  Location:R breast  When Diagnosed: 2014  Surgery:                      []Yes                  [x]No                    Details:none, pt declined all traditional intevention  Node Dissection:         []Yes                  [x]No                    Where removed:  Chemotherapy:           []Yes                  [x]No                    []Pending           []In progress      []Completed  Radiation:                    []Yes                  [x]No                    []Pending           []In progress      []Completed  Comments: pt using homeopathic treatment only     Came to Clinic  [] Bandaged                                        []Unbandaged                        [] With Stocking                                  [x] With Sleeve -compression sleeve to RUE                     [] Unna Boot                                       []With alternative compression:  [] Kinesiotaped  [] R. Hand                      [] Left Hand   [] R. Arm                        [] Left Arm   [] R. Leg             [] Left Leg                         [] R.  Breast                    [] L Breast                        []Right Upper Back    [] L Upper Back      [] Other:     Skin Integrity/Appearance- location: R axilla, chest wall, R lateral neck, lateral scapular region  [] Normal           [] Dry                  []Moist/weeping/blisters     [x] Warmth/mild inflamation  [x] Brawny/hardened       [] Wylie hump-dorsum        [] Rash        [x] Reddness  [] Crusted/bumpy       [x] Firm edema     [] Loose, lobular     []  Soft, doughy  Other Symptoms:  [] Hyperkeratosis           [] Hyperplasia             [] Hyperpigmentation   [] Skin breakdown with lymphorrhea, neck             [] Recent cellulitis              [x] Fibrosis  RUE/chest wall        [] Papillomatosis        [] fatty lobules        [] Elephantiasis  [] Truncal/abdominal swelling   [x] Chest/axillary swelling     [] Genital swelling  [] Impaired ROM       [] Impaired mobility           [] Other  [] Stemmers sign:         [] Scars:                     [] Thick/rigid  [] Raised   [] Flattened   [] Softened  [] Mobility of scar:      [] Poor           [] Fair        [] Good         [] Normal       [] Other:    Wounds:  [] Shallow, painlful venous   [] skin denudement (top layer removed by moisture)  [] Radiation burns/ulcers     [] Superficial abrasions  [] Excoriation (compulsive picking) [] Being addressed by Laura Lawson     Color  [] Normal           [] Mottled           [x] Flushed/erythema      [] Other/Description  Location of problem area/s: RUE, chest wall, R lateral neck, R axilla, R lateral scapular region  Edema  Edema Location:  [] 1+ Edema       [] 2+ Edema        [x] 3+ Edema      [] 4+ Edema  Edema Location:  [] 1+ Edema       [] 2+ Edema        [] 3+ Edema      [] 4+ Edema  Edema Location:  [] 1+ Edema       [] 2+ Edema        [] 3+ Edema      [] 4+ Edema     When did swelling start? Following diagnosis of CA  What are the potential triggers for swelling?  None noted  Aggravating factors: significant progression of the disease  Relieving factors: MLD, wearing compression sleeve     Subjective: \"Chest feels most tight in the morning\"  Objective:   [x] Measurement [x]Manual Lymph Drainage        [] Bandaging      [] Kinesiotaping  [x] Education                   [x]Self Massage              []Self Bandaging  [] Exercise                     [] Wound Care               [] Hygiene          [x]  wearing compression sleeve to RUE  Other:      Education Included:  [x] General Lymphedema Information                [x] Dos and Donts          [x] Self Massage  []Home Exercises          [] Self Bandaging          [] Kinesiotaping             []  Hygiene  Learner: [x] Patient         [] Spouse                       []Other               [] Family  Method: [x] Verbal          [x] Demonstration           [] Handouts                    [] Exercise Booklet  Response:       [x] Verbalized understanding      [x] Demonstrated understanding                            [] Needs assist              [] No understanding     Physical Status:  Range of motion: Deficits [] Yes [x] No                            INEVVPZ:  Strength:              Deficits [] Yes [x] No                            YVTPINT:  Sensation:            Deficits [] Yes [x] No                            OWIVTYD:  Transfer:               Deficits [] Yes [x] No                            LJVVEMF:  Ambulation:          Deficits [x] Yes [] No                            Comment: uses cane for support  Functional Status:  Self Care:                     [x] Independent  [] Needs Assist  [] Dependent   Home

## 2018-10-19 ENCOUNTER — HOSPITAL ENCOUNTER (OUTPATIENT)
Dept: OCCUPATIONAL THERAPY | Age: 83
Setting detail: THERAPIES SERIES
Discharge: HOME | End: 2018-10-19
Payer: MEDICARE

## 2018-10-19 PROCEDURE — 97140 MANUAL THERAPY 1/> REGIONS: CPT

## 2018-10-20 NOTE — PROGRESS NOTES
[facility]  Lymphedema Services  Daily Progress Note     Date: 10/19/18  Patients Name: Barb Tanner  :34  Gender: female  OKX:[id number]  Ines Patel supplement-BMN  Referring Brittany Hastings MD  Time:1:05-2:05  Visit number:14     Treatment Charges:     Minutes      Units      Evaluation              Low              Moderate              High          Manual Therapy 60 4      Therapeutic Activity          Other               Total Treatment Time    60 4      Daily Charge: $     90.17  Previous Total:  1309.79  Current Total:$  8715.22     Patient is a _80_year old []male [x]female referred to the Lymphedema Clinic with a diagnosis of _RUE and UOQ of R chest wall Lymphedema d/t Advanced Breast Cancer at the request of Dr Kan Joyce.  Pt diagnosed with R breast CA in  with a mild progression of the metastatic disease around the R breast since that time.  Pt has been treated exclusively through homeopathic and natural means which is contrary to recommendations by typical allopathic medicine.  Pt has advanced intraductal carcinoma with progression that involves enlargement of lymph nodes in the supraclavicular fossa and axillary region.   Pt lives with supportive spouse, uses cane for stability during ambulation.  Pt is independent with ADLs.            ICD 10 Code:  [x] I89.0   Secondary lymphedema, not otherwise classified  [] Q82.0  Primary lymphedema (congenital) including lymphedema tarda (> 34yo)  [] I97.2   Secondary lymphedema d/t postmastectomy  [] I87.2/L97.929(L)  CVI with 6 mo non-healing venous stasis ulcer  [] I87.2/L97.919(R) CVI with 6 mo non-healing venous stasis ulcer  [] E88.2 lipomatosis NOS   [x] C50.911 Malignant neoplasm of R breast     Restrictions/Precautions:   [x] No blood pressure/blood draw in: [] Left Upper Extremity [x] Right Upper Extremity      [x] Fall Risk       [x] No                                  [x] With Sleeve -compression sleeve to RUE                     [] Unna Boot                                       []With alternative compression:  [] Kinesiotaped  [] R. Hand                      [] Left Hand   [] R. Arm                        [] Left Arm   [] R. Leg             [] Left Leg                         [] R.  Breast                    [] L Breast                        []Right Upper Back    [] L Upper Back      [] Other:     Skin Integrity/Appearance- location: R axilla, chest wall, R lateral neck, lateral scapular region, abdomin  [] Normal           [] Dry                  []Moist/weeping/blisters     [x] Warmth/mild inflamation  [x] Brawny/hardened       [] [place] hump-dorsum        [] Rash        [x] Reddness  [] Crusted/bumpy       [x] Firm edema     [] Loose, lobular     []  Soft, doughy  Other Symptoms:  [] Hyperkeratosis           [] Hyperplasia             [] Hyperpigmentation   [] Skin breakdown with lymphorrhea, neck             [] Recent cellulitis              [x] Fibrosis  RUE/chest wall        [] Papillomatosis        [] fatty lobules        [] Elephantiasis  [] Truncal/abdominal swelling   [x] Chest/axillary swelling     [] Genital swelling  [] Impaired ROM       [] Impaired mobility           [] Other  [] Stemmers sign:         [] Scars:                     [] Thick/rigid  [] Raised   [] Flattened   [] Softened  [] Mobility of scar:      [] Poor           [] Fair        [] Good         [] Normal       [] Other:    Wounds:  [] Shallow, painlful venous   [] skin denudement (top layer removed by moisture)  [] Radiation burns/ulcers     [] Superficial abrasions  [] Excoriation (compulsive picking) [] Being addressed by Laura Lawson     Color  [] Normal           [] Mottled           [x] Flushed/erythema      [] Other/Description  Location of problem area/s: RUE, chest wall, R lateral neck, R axilla, R lateral scapular region  Edema  Edema

## 2018-10-22 ENCOUNTER — HOSPITAL ENCOUNTER (OUTPATIENT)
Dept: OCCUPATIONAL THERAPY | Age: 83
Setting detail: THERAPIES SERIES
Discharge: HOME | End: 2018-10-22
Payer: MEDICARE

## 2018-10-22 PROCEDURE — 97140 MANUAL THERAPY 1/> REGIONS: CPT

## 2018-10-23 NOTE — PROGRESS NOTES
[x] With Sleeve -compression sleeve to RUE                     [] Unna Boot                                       []With alternative compression:  [] Kinesiotaped  [] R. Hand                      [] Left Hand   [] R. Arm                        [] Left Arm   [] R. Leg             [] Left Leg                         [] R.  Breast                    [] L Breast                        []Right Upper Back    [] L Upper Back      [] Other:     Skin Integrity/Appearance- location: R axilla, chest wall, R lateral neck, lateral scapular region, abdomin  [] Normal           [] Dry                  []Moist/weeping/blisters     [x] Warmth/mild/moderate inflamation  [x] Brawny/hardened       [] [place] hump-dorsum        [] Rash        [x] Reddness  [] Crusted/bumpy       [x] Firm edema     [] Loose, lobular     []  Soft, doughy  Other Symptoms:  [] Hyperkeratosis           [] Hyperplasia             [] Hyperpigmentation   [] Skin breakdown with lymphorrhea, neck             [] Recent cellulitis              [x] Fibrosis  RUE/chest wall        [] Papillomatosis        [] fatty lobules        [] Elephantiasis  [] Truncal/abdominal swelling   [x] Chest/axillary swelling     [] Genital swelling  [] Impaired ROM       [] Impaired mobility           [] Other  [] Stemmers sign:         [] Scars:                     [] Thick/rigid  [] Raised   [] Flattened   [] Softened  [] Mobility of scar:      [] Poor           [] Fair        [] Good         [] Normal       [] Other:    Wounds:  [] Shallow, painlful venous   [] skin denudement (top layer removed by moisture)  [] Radiation burns/ulcers     [] Superficial abrasions  [] Excoriation (compulsive picking) [] Being addressed by Laura Lawson     Color  [] Normal           [] Mottled           [x] Flushed/erythema      [] Other/Description  Location of problem area/s: RUE, chest wall, R lateral neck, R axilla, R lateral scapular region  Edema  Edema Location:  [] 1+ Edema       [] 2+ Edema        [x] 3+ Edema      [] 4+ Edema  Edema Location:  [] 1+ Edema       [] 2+ Edema        [] 3+ Edema      [] 4+ Edema  Edema Location:  [] 1+ Edema       [] 2+ Edema        [] 3+ Edema      [] 4+ Edema     When did swelling start? Following diagnosis of CA  What are the potential triggers for swelling?  None noted  Aggravating factors: significant progression of the disease  Relieving factors: MLD, wearing compression sleeve to RUE     Subjective: \"My neck is very tight also sometimes limiting moving my head, the hardness is extending over to the other breast now\"  Objective:   [x] Measurement [x]Manual Lymph Drainage        [] Bandaging      [] Kinesiotaping  [x] Education                   [x]Self Massage              []Self Bandaging  [] Exercise                     [] Wound Care               [] Hygiene          [x]  wearing compression sleeve to RUE  Other:      Education Included:  [x] General Lymphedema Information                [x] Dos and Donts          [x] Self Massage  []Home Exercises          [] Self Bandaging          [] Kinesiotaping             []  Hygiene  Learner: [x] Patient         [] Spouse                       []Other               [] Family  Method: [x] Verbal          [x] Demonstration           [] Handouts                    [] Exercise Booklet  Response:       [x] Verbalized understanding      [x] Demonstrated understanding                            [] Needs assist              [] No understanding     Physical Status:  Range of motion: Deficits [] Yes [x] No                            VFRVPXL:  Strength:              Deficits [] Yes [x] No                            OMSPNNW:  Sensation:            Deficits [] Yes [x] No                            JXLSNYL:  Transfer:               Deficits [] Yes [x] No                            GYSJTNS:  Ambulation:          Deficits [x] Yes [] No                            Comment: uses cane for support  Functional Status:  Self Care:                     [x] Independent  [] Needs Assist  [] Dependent   Home Maintenance:    [] Independent  [x] Needs Assist  [] Dependent  Comments:  Fatigue:                        [] None identified          [x] Minimum        [] Moderate        [] Significant  Comments:  Psychosocial Status:  [x] Confident, independent          [] limited ADLs   [] stress, isolation, fear of future          [] sense of loss of control over life  AAO x 3:                     [x] Yes [] No                       Comment: memory loss, looks to daughter to answer questions, requires frequent repetition of instructions  Domestic Concern: none noted, supportive daughters assists as needed  Suggested Professional Referral:     Measurements Upper Extremity  Measurements are made in 4 cm increments and are circumferential. Fingers are measured at base. CM Right Left   20 28.5     16 29     12 30     8 28.5     4 26.5     Olecranon 24     20 24     16 23     12 20.5     8 18     4 16.5     Wrist 15     Dorsum 20.5     SF 5     RF 5     MF 6     IF 6     TH 6     Total 332        *Trunk measurements:  92 cm upper chest (increase of 3.5cm above breasts), 89 cm lower chest (increase of 1.5cm below breasts).     Assessment  Knowledge of home program:             [] Good [x] Fair                 [] Poor  Family can assist with programming: [] Yes                 [x] No  Other:   Instructions for patient:                       [x] Verbal [x] Demonstration           [] Written  Instructions addressed: Pt arrived with  who remained in waiting room. RUE examined, measured. R axilla nodes are increasingly raised , swollen and sensitive to touch.  RUE Measurement's decreased by -7.5cm since last treatment but swelling to anterior and posterior shoulder region continues to be swollen above the silicone band of compression sleeve. pt reports that although compression sleeve is providing moderate relief to evidence on physical exam  [] 1 - Early Stage, swelling and pitting edema, subsides with limb elevation  [x] 2 - More advanced, fibrosis resulting in non-pitting edema, does not respond to elevation, thickening of the tissues  [] 3 - Elephantiasis, pitting absent, huge limbs with dry, scaly, blistered, warty skin changes, fluid may be leaking with skin infections common. Acanthosis (deep body folds).      Rehabilitation Potential: [] Good           [x] Fair                 [] Poor  Comment: mild/moderate memory loss noted, pt requires extra time and multiple reminders  Response to treatment:pt receptive to education  Patients Goal:reduce swelling and pain     Therapy Goals  Pt will be seen 1-3 times a week to address:  [x] 1) Patient will be educated and express understanding of causative factors for Lymphedema, prevention of exacerbations, skin care, exercise, self massage, self bandaging (if indicated), and compression garment application (if indicated). STG: Every treatment  LT__ weeks  [x] 2) Patient or caregiver will consistently follow home programming instructions from appointment to appointment including bandaging, self massage, exercise or any additional intervention recommended. STG:_6_ weeks   LTG: _12__ weeks  [x] 3) Patient will realize limb reduction as evidenced by decrease in limb measurements. STG: 3% reduction__6_ weeks   LTG: __5%__ reduction _12__ weeks  [x] 4). Patient will be referred for fitting of a compression garment or alternative compression when maximum results are achieved. (Indication are no further changes in numerical status or changes in tissue integrity.) LTG: _12_ weeks  [] 5). Pt will increase his/her level of function as demonstrated by increased ability to _____________.  LTG: ___ weeks  [] 6). _________ STG: __ weeks   LTG: __ weeks     Plan: Continue to address:     []Bandaging  [] Self Bandaging/Family Assist  [x]MLD  [x]Self Massage  []Exercise  [x]Education  [x]Obtain

## 2018-10-26 ENCOUNTER — HOSPITAL ENCOUNTER (OUTPATIENT)
Dept: OCCUPATIONAL THERAPY | Age: 83
Setting detail: THERAPIES SERIES
Discharge: HOME | End: 2018-10-26
Payer: MEDICARE

## 2018-10-26 PROCEDURE — 97140 MANUAL THERAPY 1/> REGIONS: CPT

## 2018-10-27 NOTE — PROGRESS NOTES
[] Yes                 Intervention: uses cane for support at times  []Other:     Pain:  [] No    [x] Yes  Location:R breast and axilla region  Pain Rating (0-10 pain scale):8/10  Pain Description:  [x] Achy,worse at end of day  [] distention, discomfort  [x] sensitive with palpation  [] guarding, tingling, numbness      [x] Hypersensitivity    [] Radiation fibrosis  Interruption of Treatment [] Yes  [x] No     Medications:  [x]See charted information     Past Medical History:  [x] See charted information  Polyarthritis, systemic lupus erythematosus, degenerative disc disease and scoliosis, major depressive disorder     Conservative Treatments Utilized in the Past:  none  [] Compression Garments         [] Bandaging      [] Elevation         [] Exercise      []Self MLD  Frequency/Duration:     Lymphedema Contraindication Assessment:  [] CHF                            []DVT     []Kidney Problems  []Cellulitis (warmth, tenderness, less defined erythematous borders)   [] Erysipelas (fiery/red plague, raised defined borders)             Clearance needed:     []Yes                  [x]No                    [] Obtained  Physician giving Clearance:     Cancer history             [x]Yes                  []No  Location:R breast  When Diagnosed: 2014  Surgery:                      []Yes                  [x]No                    Details:none, pt declined all traditional intevention  Node Dissection:         []Yes                  [x]No                    Where removed:  Chemotherapy:           []Yes                  [x]No                    []Pending           []In progress      []Completed  Radiation:                    []Yes                  [x]No                    []Pending           []In progress      []Completed  Comments: pt using homeopathic treatment only     Came to Clinic  [] Bandaged                                        []Unbandaged                        [] With Stocking region  Edema  Edema Location:RUE, chest wall, R lateral neck, R axilla, R lateral scapular region  [] 1+ Edema       [] 2+ Edema        [x] 3+ Edema      [] 4+ Edema  Edema Location:  [] 1+ Edema       [] 2+ Edema        [] 3+ Edema      [] 4+ Edema  Edema Location:  [] 1+ Edema       [] 2+ Edema        [] 3+ Edema      [] 4+ Edema     When did swelling start? Following diagnosis of Breast CA  What are the potential triggers for swelling? None noted  Aggravating factors: significant progression of the disease  Relieving factors: MLD, wearing compression sleeve to RUE     Subjective: \"My arm is getting worse and the swelling is spreading. I am having more pain. \"   Objective:   [x] Measurement [x]Manual Lymph Drainage        [] Bandaging      [] Kinesiotaping  [x] Education                   [x]Self Massage              []Self Bandaging  [] Exercise                     [] Wound Care               [] Hygiene          [x]  wearing compression sleeve to RUE  Other:      Education Included:  [x] General Lymphedema Information                [x] Dos and Donts          [x] Self Massage  []Home Exercises          [] Self Bandaging          [] Kinesiotaping             []  Hygiene  Learner: [x] Patient         [] Spouse                       []Other               [] Family  Method: [x] Verbal          [x] Demonstration           [] Handouts                    [] Exercise Booklet  Response:       [x] Verbalized understanding      [x] Demonstrated understanding                            [] Needs assist              [] No understanding     Physical Status:  Range of motion: Deficits [x] Yes [] No                            QDRUHDS: limite shoulder AROM d/t arthritis  Strength:              Deficits [] Yes [x] No                            LEJBETV:  Sensation:            Deficits [] Yes [x] No                            UISSDFP:  Transfer:               Deficits [] Yes [x] No pain     Therapy Goals  Pt will be seen 1-3 times a week to address:  [x] 1) Patient will be educated and express understanding of causative factors for Lymphedema, prevention of exacerbations, skin care, exercise, self massage, self bandaging (if indicated), and compression garment application (if indicated). STG: Every treatment  LT__ weeks  [x] 2) Patient or caregiver will consistently follow home programming instructions from appointment to appointment including bandaging, self massage, exercise or any additional intervention recommended. STG:_6_ weeks   LTG: _12__ weeks  [x] 3) Patient will realize limb reduction as evidenced by decrease in limb measurements. STG: 3% reduction__6_ weeks   LTG: __5%__ reduction _12__ weeks  [x] 4). Patient will be referred for fitting of a compression garment or alternative compression when maximum results are achieved. (Indication are no further changes in numerical status or changes in tissue integrity.) LTG: _12_ weeks  [] 5). Pt will increase his/her level of function as demonstrated by increased ability to _____________.  LTG: ___ weeks  [] 6). _________ STG: __ weeks   LTG: __ weeks     Plan: Continue to address:     []Bandaging  [] Self Bandaging/Family Assist  [x]MLD  [x]Self Massage  []Exercise  [x]Education  [x]Obtain referral for garments  []Medical Hold  []Discharge to Home Program     G-Codes:    [x] Eval _18___ [x] 10th visit 10/10/18  []  visit ____ [] 30 visit____  [] D/C ____      Functional Limitation: EVAL 34/72, 47%,  visit 41 56%  Functional Assessment Used: Lymphedema Life Impact Scale (LLIS)  Current Status Modifier: CK  Goal Status Modifier: CJ  Discharge Status Modifier:     Physican signature is required for :  [] Plan of Care  [x]Medicare Requirement     Kori Justin MOT, OTR/L, CLT

## 2018-10-29 ENCOUNTER — HOSPITAL ENCOUNTER (OUTPATIENT)
Dept: OCCUPATIONAL THERAPY | Age: 83
Setting detail: THERAPIES SERIES
Discharge: HOME | End: 2018-10-29
Payer: MEDICARE

## 2018-10-29 PROCEDURE — 97140 MANUAL THERAPY 1/> REGIONS: CPT

## 2018-10-29 NOTE — PROGRESS NOTES
[] Yes                 Intervention: uses cane for support at times  []Other:     Pain:  [] No    [x] Yes  Location:R breast, axilla region, scapula, neck  Pain Rating (0-10 pain scale):8/10  Pain Description:  [x] Achy,worse at end of day  [] distention, discomfort  [x] sensitive with palpation  [] guarding, tingling, numbness      [x] Hypersensitivity    [] Radiation fibrosis  Interruption of Treatment [] Yes  [x] No     Medications:  [x]See charted information     Past Medical History:  [x] See charted information  Polyarthritis, systemic lupus erythematosus, degenerative disc disease and scoliosis, major depressive disorder     Conservative Treatments Utilized in the Past:  none  [] Compression Garments         [] Bandaging      [] Elevation         [] Exercise      []Self MLD  Frequency/Duration:     Lymphedema Contraindication Assessment:  [] CHF                            []DVT     []Kidney Problems  []Cellulitis (warmth, tenderness, less defined erythematous borders)   [] Erysipelas (fiery/red plague, raised defined borders)             Clearance needed:     []Yes                  [x]No                    [] Obtained  Physician giving Clearance:     Cancer history             [x]Yes                  []No  Location:R breast  When Diagnosed: 2014  Surgery:                      []Yes                  [x]No                    Details:none, pt declined all traditional intevention  Node Dissection:         []Yes                  [x]No                    Where removed:  Chemotherapy:           []Yes                  [x]No                    []Pending           []In progress      []Completed  Radiation:                    []Yes                  [x]No                    []Pending           []In progress      []Completed  Comments: pt using homeopathic treatment only     Came to Clinic  [] Bandaged                                        []Unbandaged                        [] With Stocking                                  [x] With Sleeve -compression sleeve to RUE                     [] Unna Boot                                       []With alternative compression:  [] Kinesiotaped  [] R. Hand                      [] Left Hand   [] R. Arm                        [] Left Arm   [] R. Leg             [] Left Leg                         [] R.  Breast                    [] L Breast                        []Right Upper Back    [] L Upper Back      [] Other:     Skin Integrity/Appearance- location: R axilla, R UE, chest wall, R lateral neck, lateral scapular region, abdomin  [] Normal           [x] Dry                  []Moist/weeping/blisters     [x] Warmth/mild/moderate inflamation  [x] Brawny/hardened       [] Sanders hump-dorsum        [] Rash        [x] Reddness  [x] Crusted/bumpy       [x] Firm edema     [] Loose, lobular     []  Soft, doughy  Other Symptoms:  [] Hyperkeratosis           [] Hyperplasia             [] Hyperpigmentation   [] Skin breakdown with lymphorrhea, neck             [] Recent cellulitis              [] Fibrosis  RUE/chest wall        [] Papillomatosis        [] fatty lobules        [] Elephantiasis  [x] Truncal/abdominal swelling   [x] Chest/axillary swelling     [] Genital swelling  [x] Impaired ROM       [] Impaired mobility           [] Other  [] Stemmers sign:         [] Scars:                     [] Thick/rigid  [] Raised   [] Flattened   [] Softened  [] Mobility of scar:      [] Poor           [] Fair        [] Good         [] Normal       [] Other:    Wounds:  [] Shallow, painlful venous   [] skin denudement (top layer removed by moisture)  [] Radiation burns/ulcers     [] Superficial abrasions  [] Excoriation (compulsive picking) [] Being addressed by Laura Lawson     Color  [] Normal           [] Mottled           [x] Flushed/erythema      [] Other/Description  Location of problem area/s: RUE, chest wall, R lateral neck, R axilla, R lateral scapular region, lateral                            BXOVWRY:  Ambulation:          Deficits [x] Yes [] No                            Comment: uses cane for support, R hip painful  Functional Status:  Self Care:                     [x] Independent  [] Needs Assist  [] Dependent   Home Maintenance:    [] Independent  [x] Needs Assist  [] Dependent  Comments:  Fatigue:                        [] None identified          [x] Minimum        [] Moderate        [] Significant  Comments:  Psychosocial Status:  [x] Confident, independent          [] limited ADLs   [] stress, isolation, fear of future          [] sense of loss of control over life  AAO x 3:                     [x] Yes [] No                       Comment: memory loss, looks to daughter to answer questions, requires frequent repetition of instructions  Domestic Concern: none noted, supportive daughters assists as needed  Suggested Professional Referral:     Measurements Upper Extremity  Measurements are made in 4 cm increments and are circumferential. Fingers are measured at base. CM Right Left   20 35     16 38     12 29.5     8 30     4 28.5     Olecranon 24.5     20 24     16 23.5     12 21     8 18     4 16.5     Wrist 15.5     Dorsum 20     SF 5     RF 5.5     MF 6     IF 6     TH 6     Total 353        *Trunk measurements:  89 cm upper chest ( above breasts), 98 cm lower chest ( below breasts) (decrease of 1 cm below breasts).     Assessment  Knowledge of home program:             [] Good [x] Fair                 [] Poor  Family can assist with programming: [] Yes                 [x] No  Other:   Instructions for patient:                       [x] Verbal [x] Demonstration           [] Written  Instructions addressed: Pt arrived with  who remained in waiting room. RUE examined and measured. Pitting edema and fibrosis noted to R lower arm, medial cubital region and posterior upper arm; swelling to R shoulder region superior to compression sleeve has increased.  R axillary nodes increasing in fibrosis and density; pt reports that distention of R axillary nodes has increased in pressure and pain. Axillary nodes are visually protruding at axilla and palpably broadened in width posteriorly and inferiorly down the axillary/inguinal anastomosis. 8 LN are palpable with severe swelling at R axilla ranging from . 5cm and 1.25 cm in circumference per lymph node. The severe fibrosis to the chest is spreading into the L medial breast region and increasing in tightness and pain, which limits AROM. Fibrosis of supraclaivular fossa on R lateral neck surface has increased, limiting AROM of head/neck. 9 Lymph Nodes are palapble to touch with extreme swelling measuring between . 5cm and 1cm circumference per lymph node, pocketing with 4cm of dense static fluid with rouse color noted. Pt educ to perform simple neck ROM exercises including rotation R<>L, flexion/extension with chin toward chest and back to neutral, and lateral movement with ear toward shoulders. All exercises are to done conservatively as as not to cause pain, 3-5 reps each as able. Abdomen continues to be filled with dense stagnant fluid at posterior and anterior aspects. Pt awaits delivery of standard pump from [facility].  PATIENT WOULD BENEFIT FROM [facility] (DP/SNF) COMPRESSION PUMP IN ORDER  TO FACILITATE TRANSFER OF LYMPHATIC LOAD NOT ONLY FROM RUE BUT,  TO DECREASE SEVERE/DENSE/STATIC LYMPHATIC FLUID POCKETING IN R UE, AXILLA, NECK , ABDOMIN, CHEST AND SCAPULA, ALSO TO DECREASE SEVERE  FIBROSIS OF ASSOCIATED AREA TISSUE. THERAPIST RECOMMENDS USE OF  FLEXITOUCH TWICE PER DAY. Patient completed diaphragmatic breathing to stimulate lymph nodes of the parasternal lymph nodes, MLD performed by writer to neck, shoulders, R axilla/shoulder, lateral torso, inguinal nodes, scapular region, bilateral chest wall and abdomin.  Skin at breast and chest wall have such  fibrotic tissue that skin stretch during MLD is limited due to edema, does not respond to elevation, thickening of the tissues  [] 3 - Elephantiasis, pitting absent, huge limbs with dry, scaly, blistered, warty skin changes, fluid may be leaking with skin infections common. Acanthosis (deep body folds).      Rehabilitation Potential: [] Good           [x] Fair                 [] Poor  Comment: mild/moderate memory loss noted, pt requires extra time and multiple reminders  Response to treatment:pt receptive to education  Patients Goal:reduce swelling and pain     Therapy Goals  Pt will be seen 1-3 times a week to address:  [x] 1) Patient will be educated and express understanding of causative factors for Lymphedema, prevention of exacerbations, skin care, exercise, self massage, self bandaging (if indicated), and compression garment application (if indicated). STG: Every treatment  LT__ weeks  [x] 2) Patient or caregiver will consistently follow home programming instructions from appointment to appointment including bandaging, self massage, exercise or any additional intervention recommended. STG:_6_ weeks   LTG: _12__ weeks  [x] 3) Patient will realize limb reduction as evidenced by decrease in limb measurements. STG: 3% reduction__6_ weeks   LTG: __5%__ reduction _12__ weeks  [x] 4). Patient will be referred for fitting of a compression garment or alternative compression when maximum results are achieved. (Indication are no further changes in numerical status or changes in tissue integrity.) LTG: _12_ weeks  [] 5). Pt will increase his/her level of function as demonstrated by increased ability to _____________.  LTG: ___ weeks  [] 6). _________ STG: __ weeks   LTG: __ weeks     Plan: Continue to address:     []Bandaging  [] Self Bandaging/Family Assist  [x]MLD  [x]Self Massage  []Exercise  [x]Education  [x]Obtain referral for garments  []Medical Hold  []Discharge to Home Program     G-Codes:    [x] Eval _18___ [x] 10th visit 10/10/18  []  visit ____ []  visit____  [] D/C ____      Functional Limitation: EVAL 34/72, 47%, 10th visit 41/72 56%  Functional Assessment Used: Lymphedema Life Impact Scale (LLIS)  Current Status Modifier: CK  Goal Status Modifier: CJ  Discharge Status Modifier:     Physican signature is required for :  [] Plan of Care  [x]Medicare Requirement     Jo Ann Garcia MOT, OTR/L, CLT

## 2018-10-31 ENCOUNTER — HOSPITAL ENCOUNTER (OUTPATIENT)
Dept: OCCUPATIONAL THERAPY | Age: 83
Setting detail: THERAPIES SERIES
Discharge: HOME | End: 2018-10-31
Payer: MEDICARE

## 2018-10-31 PROCEDURE — 97140 MANUAL THERAPY 1/> REGIONS: CPT

## 2018-10-31 NOTE — PROGRESS NOTES
[facility]  Lymphedema Services  Daily Progress Note     Date: 10/31/18  Patients Name: Barb Tanner  :34  Gender: female  QXI:[id number]  Insurance: Medicare, AARP supplement-BMN  Referring Adan Vigil MD  Time: 1068-2860  Visit number:19 (LLIS NEXT VISIT)      Treatment Charges:     Minutes      Units      Evaluation              Low              Moderate              High          Manual Therapy 55 4      Therapeutic Activity          Other               Total Treatment Time    55 4      Daily Charge: $      90.17  Previous Total: 1760.64  Current Total:$  1850.81     Patient is a _80_year old []male [x]female referred to the Lymphedema Clinic with a diagnosis of _RUE and UOQ of R chest wall Lymphedema d/t Advanced Breast Cancer at the request of Dr Zak Perkins.  Pt diagnosed with R breast CA in  with a mild progression of the metastatic disease around the R breast since that time.  Pt has been treated exclusively through homeopathic and natural means which is contrary to recommendations by typical allopathic medicine.  Pt has advanced intraductal carcinoma with progression that involves enlargement of lymph nodes in the supraclavicular fossa and axillary region.   Pt lives with supportive spouse, uses cane for stability during ambulation.  Pt is independent with ADLs.            ICD 10 Code:  [x] I89.0   Secondary lymphedema, not otherwise classified  [] Q82.0  Primary lymphedema (congenital) including lymphedema tarda (> 34yo)  [] I97.2   Secondary lymphedema d/t postmastectomy  [] I87.2/L97.929(L)  CVI with 6 mo non-healing venous stasis ulcer  [] I87.2/L97.919(R) CVI with 6 mo non-healing venous stasis ulcer  [] E88.2 lipomatosis NOS   [x] C50.911 Malignant neoplasm of R breast     Restrictions/Precautions:   [x] No blood pressure/blood draw in: [] Left Upper Extremity [x] Right Upper Extremity      [x] Fall Risk       [x] No                                  [] With Sleeve              [] Unna Boot                                       []With alternative compression:  [] Kinesiotaped  [] R. Hand                      [] Left Hand   [] R. Arm                        [] Left Arm   [] R. Leg             [] Left Leg                         [] R.  Breast                    [] L Breast                        []Right Upper Back    [] L Upper Back      [] Other:     Skin Integrity/Appearance- location: R axilla, R UE, chest wall, R lateral neck, lateral scapular region, abdomin  [] Normal           [x] Dry                  []Moist/weeping/blisters     [x] Warmth/mild/moderate inflamation  [x] Brawny/hardened       [] [place] hump-dorsum        [] Rash        [x] Reddness  [x] Crusted/bumpy       [x] Firm edema     [] Loose, lobular     []  Soft, doughy  Other Symptoms:  [] Hyperkeratosis           [] Hyperplasia             [] Hyperpigmentation   [] Skin breakdown with lymphorrhea, neck             [] Recent cellulitis              [] Fibrosis  RUE/chest wall        [] Papillomatosis        [] fatty lobules        [] Elephantiasis  [x] Truncal/abdominal swelling   [x] Chest/axillary swelling     [] Genital swelling  [x] Impaired ROM       [] Impaired mobility           [] Other  [] Stemmers sign:         [] Scars:                     [] Thick/rigid  [] Raised   [] Flattened   [] Softened  [] Mobility of scar:      [] Poor           [] Fair        [] Good         [] Normal       [] Other:    Wounds:  [] Shallow, painlful venous   [] skin denudement (top layer removed by moisture)  [] Radiation burns/ulcers     [] Superficial abrasions  [] Excoriation (compulsive picking) [] Being addressed by Laura Lawson     Color  [] Normal           [] Mottled           [x] Flushed/erythema      [] Other/Description  Location of problem area/s: RUE, chest wall, R lateral neck, R axilla, R lateral scapular region, lateral trunk/abdomin  Edema  Edema Location:RUE, chest wall, R lateral neck, R axilla, R lateral scapular region  [] 1+ Edema       [] 2+ Edema        [x] 3+ Edema      [] 4+ Edema  Edema Location:  [] 1+ Edema       [] 2+ Edema        [] 3+ Edema      [] 4+ Edema  Edema Location:  [] 1+ Edema       [] 2+ Edema        [] 3+ Edema      [] 4+ Edema     When did swelling start? Following diagnosis of Breast CA  What are the potential triggers for swelling? None noted  Aggravating factors: significant progression of the disease  Relieving factors: MLD, wearing compression sleeve to RUE     Subjective: \"I forgot my sleeve today. My daughter who was just diagnosed with cancer was at my house and I was distracted and forgot to put on my sleeve.  My arm is getting harder and much worse\"   Objective:   [x] Measurement [x]Manual Lymph Drainage        [] Bandaging      [] Kinesiotaping  [x] Education                   [x]Self Massage              []Self Bandaging  [] Exercise                     [] Wound Care               [] Hygiene          [x]  wearing compression sleeve to RUE  Other:      Education Included:  [x] General Lymphedema Information                [x] Dos and Donts          [x] Self Massage  []Home Exercises          [] Self Bandaging          [] Kinesiotaping             []  Hygiene  Learner: [x] Patient         [] Spouse                       []Other               [] Family  Method: [x] Verbal          [x] Demonstration           [] Handouts         [x] Exercise-neck mobility  Response:       [x] Verbalized understanding      [x] Demonstrated understanding                            [] Needs assist              [] No understanding     Physical Status:  Range of motion: Deficits [x] Yes [] No                            WTMOIHU: limited shoulder AROM d/t arthritis  Strength:              Deficits [] Yes [x] No                            LXPTRRM:  Sensation:            Deficits [] Yes [x] No fibrotic tissue that skin stretch during MLD is limited due to orange like fibroticy. Severe fibrosis is affecting pt function, movement  and ability to perform basic ADLs, fibrosis to supraclavicular nodes limits head movement, simple exercises recommended to facilitate greater AROM.  Puffiness noted to dorsum of R hand. Pt as been compliant with all treatment strategies, and is participating in clinical treatment 2-3x/wk consistently. Home program includes:  PATIENT IS EATING A CLEAN DIET WITHOUT DAIRY AND SUGAR. LOW IN SODIUM AND LOW IN CARBOHYDRATES. PATIENT IS COMPLETING SELF MLD TO THE BEST ABILITY MULTIPLE X/DAY, BUT REPORTS DIFFICULTY  DUE TO DECREASED AROM IN R UE AFFECTING ABILITY TO REACH CERTAIN AREAS DURING LYMPH NODE STIMULATION AND SELF MLD.         Script written for Flit to be issued to pt's daughter to be purchased through her work.  Pt is aware of out-of-pocket expense.  Pt appeared to have a good understanding of the information presented and the plan recommended. All subsequent appointments are to be scheduled in [place] to reduce chance of pt confusion.  Pt very pleased with therapy services.   OT to follow.     Compression Garment recommendation for medical management: Farrow wrap OTS Lite compression sleeve, 20-30mmhg size medium/long                                                                                                                Jaqueline Lite Hand Gauntlet, beige, medium, [id number] to pt, daughter to obtain through her work  [x] 20/30 mmHg    [] 30/40 mmHg   [] 40/50 mmHg    [] 50/60 mmHg  Comments:Trinh ordering through her work.     Lymphedema Stage: Per ISL Guidelines  [] 0 - Subclinical with no evidence on physical exam  [] 1 - Early Stage, swelling and pitting edema, subsides with limb elevation  [x] 2 - More advanced, fibrosis resulting in non-pitting edema, does not respond to elevation, thickening of the tissues  [] 3 - Elephantiasis, 56%  Functional Assessment Used: Lymphedema Life Impact Scale (LLIS)  Current Status Modifier: CK  Goal Status Modifier: CJ  Discharge Status Modifier:     Physican signature is required for :  [] Plan of Care  [x]Medicare Requirement   OLIVE Daniels, CAMMIET, WARD-ANTELMO

## 2018-11-02 ENCOUNTER — HOSPITAL ENCOUNTER (OUTPATIENT)
Dept: OCCUPATIONAL THERAPY | Age: 83
Setting detail: THERAPIES SERIES
Discharge: HOME | End: 2018-11-02
Payer: MEDICARE

## 2018-11-02 PROCEDURE — 97140 MANUAL THERAPY 1/> REGIONS: CPT

## 2018-11-05 ENCOUNTER — HOSPITAL ENCOUNTER (EMERGENCY)
Age: 83
Discharge: HOME | End: 2018-11-05
Attending: EMERGENCY MEDICINE
Payer: MEDICARE

## 2018-11-05 ENCOUNTER — HOSPITAL ENCOUNTER (OUTPATIENT)
Dept: OCCUPATIONAL THERAPY | Age: 83
Setting detail: THERAPIES SERIES
Discharge: HOME | End: 2018-11-05
Payer: MEDICARE

## 2018-11-05 ENCOUNTER — APPOINTMENT (OUTPATIENT)
Dept: CT IMAGING | Age: 83
End: 2018-11-05
Payer: MEDICARE

## 2018-11-05 VITALS
HEART RATE: 65 BPM | DIASTOLIC BLOOD PRESSURE: 85 MMHG | RESPIRATION RATE: 15 BRPM | WEIGHT: 124 LBS | OXYGEN SATURATION: 97 % | BODY MASS INDEX: 21.28 KG/M2 | TEMPERATURE: 98.1 F | SYSTOLIC BLOOD PRESSURE: 163 MMHG

## 2018-11-05 DIAGNOSIS — C50.919 METASTATIC BREAST CANCER (HCC): Primary | ICD-10-CM

## 2018-11-05 LAB
ABSOLUTE EOS #: 0 K/UL (ref 0–0.4)
ABSOLUTE IMMATURE GRANULOCYTE: ABNORMAL K/UL (ref 0–0.3)
ABSOLUTE LYMPH #: 0.8 K/UL (ref 1–4.8)
ABSOLUTE MONO #: 0.5 K/UL (ref 0.1–1.2)
ANION GAP SERPL CALCULATED.3IONS-SCNC: 13 MMOL/L (ref 9–17)
BASOPHILS # BLD: 1 % (ref 0–2)
BASOPHILS ABSOLUTE: 0.1 K/UL (ref 0–0.2)
BUN BLDV-MCNC: 20 MG/DL (ref 8–23)
BUN/CREAT BLD: ABNORMAL (ref 9–20)
CALCIUM SERPL-MCNC: 9.8 MG/DL (ref 8.6–10.4)
CHLORIDE BLD-SCNC: 98 MMOL/L (ref 98–107)
CO2: 26 MMOL/L (ref 20–31)
CREAT SERPL-MCNC: 0.8 MG/DL (ref 0.5–0.9)
DIFFERENTIAL TYPE: ABNORMAL
EOSINOPHILS RELATIVE PERCENT: 0 % (ref 1–4)
GFR AFRICAN AMERICAN: >60 ML/MIN
GFR NON-AFRICAN AMERICAN: >60 ML/MIN
GFR SERPL CREATININE-BSD FRML MDRD: ABNORMAL ML/MIN/{1.73_M2}
GFR SERPL CREATININE-BSD FRML MDRD: ABNORMAL ML/MIN/{1.73_M2}
GLUCOSE BLD-MCNC: 107 MG/DL (ref 70–99)
HCT VFR BLD CALC: 44.6 % (ref 36–46)
HEMOGLOBIN: 14.8 G/DL (ref 12–16)
IMMATURE GRANULOCYTES: ABNORMAL %
LYMPHOCYTES # BLD: 10 % (ref 24–44)
MCH RBC QN AUTO: 34.5 PG (ref 26–34)
MCHC RBC AUTO-ENTMCNC: 33.1 G/DL (ref 31–37)
MCV RBC AUTO: 104.2 FL (ref 80–100)
MONOCYTES # BLD: 7 % (ref 2–11)
NRBC AUTOMATED: ABNORMAL PER 100 WBC
PDW BLD-RTO: 12.8 % (ref 12.5–15.4)
PLATELET # BLD: 335 K/UL (ref 140–450)
PLATELET ESTIMATE: ABNORMAL
PMV BLD AUTO: 7.1 FL (ref 6–12)
POTASSIUM SERPL-SCNC: 4.4 MMOL/L (ref 3.7–5.3)
RBC # BLD: 4.28 M/UL (ref 4–5.2)
RBC # BLD: ABNORMAL 10*6/UL
SEG NEUTROPHILS: 82 % (ref 36–66)
SEGMENTED NEUTROPHILS ABSOLUTE COUNT: 6.2 K/UL (ref 1.8–7.7)
SODIUM BLD-SCNC: 137 MMOL/L (ref 135–144)
WBC # BLD: 7.6 K/UL (ref 3.5–11)
WBC # BLD: ABNORMAL 10*3/UL

## 2018-11-05 PROCEDURE — 6360000004 HC RX CONTRAST MEDICATION: Performed by: EMERGENCY MEDICINE

## 2018-11-05 PROCEDURE — 85025 COMPLETE CBC W/AUTO DIFF WBC: CPT

## 2018-11-05 PROCEDURE — 80048 BASIC METABOLIC PNL TOTAL CA: CPT

## 2018-11-05 PROCEDURE — 99284 EMERGENCY DEPT VISIT MOD MDM: CPT

## 2018-11-05 PROCEDURE — 36415 COLL VENOUS BLD VENIPUNCTURE: CPT

## 2018-11-05 PROCEDURE — 71260 CT THORAX DX C+: CPT

## 2018-11-05 PROCEDURE — 2580000003 HC RX 258: Performed by: EMERGENCY MEDICINE

## 2018-11-05 PROCEDURE — 97140 MANUAL THERAPY 1/> REGIONS: CPT

## 2018-11-05 RX ORDER — 0.9 % SODIUM CHLORIDE 0.9 %
45 INTRAVENOUS SOLUTION INTRAVENOUS ONCE
Status: COMPLETED | OUTPATIENT
Start: 2018-11-05 | End: 2018-11-05

## 2018-11-05 RX ORDER — SODIUM CHLORIDE 0.9 % (FLUSH) 0.9 %
10 SYRINGE (ML) INJECTION PRN
Status: DISCONTINUED | OUTPATIENT
Start: 2018-11-05 | End: 2018-11-05 | Stop reason: HOSPADM

## 2018-11-05 RX ADMIN — Medication 10 ML: at 17:04

## 2018-11-05 RX ADMIN — IOPAMIDOL 75 ML: 755 INJECTION, SOLUTION INTRAVENOUS at 17:03

## 2018-11-05 RX ADMIN — SODIUM CHLORIDE 45 ML: 9 INJECTION, SOLUTION INTRAVENOUS at 17:04

## 2018-11-05 ASSESSMENT — ENCOUNTER SYMPTOMS
EYE REDNESS: 0
VOMITING: 0
COUGH: 0
SORE THROAT: 0
NAUSEA: 0
BACK PAIN: 0
ABDOMINAL PAIN: 0
EYE DISCHARGE: 0
SHORTNESS OF BREATH: 0

## 2018-11-05 NOTE — ED PROVIDER NOTES
[facility] ED  [address] [address]  Phone: [phone]  Fax: [phone]      Pt Name: Juan Colmenares  MRN: [id number]  [place] 1934  Date of evaluation: 11/5/2018      CHIEF COMPLAINT       Chief Complaint   Patient presents with    Lymphadenopathy     RUE axilla area drainage from lymph nodes, s/s started this past Saturday       HISTORY OF PRESENT ILLNESS   (Location, Quality, Severity, Duration, Timing, Context, ModifyingFactors, Associated Signs and Symptoms)     Juan Colmenares is a 80 y.o. female who presents to the ER for evaluation of redness and drainage from the right axilla. Patient was diagnosed with invasive ductal carcinoma of the breast 5 years ago. He should states that she was on an oral medication for the breast cancer is now under homeopathic treatment. Patient states that she gets injections of mistletoe. She is also used oils to the skin. Patient developed lymphedema in the right arm apparently 6 months ago. She has massage to help with the lymphedema 3 times a week. Since Saturday she has had redness and drainage from the right axilla. The drainage has a follow-up care. Patient has had no fevers or chills. Patient states that she does have some mild discomfort in the right axilla that she rates at 3/10. Nursing Notes were reviewed. REVIEW OF SYSTEMS     (2-9 systems for level 4, 10 or more for level 5)    Review of Systems   Constitutional: Negative for chills and fever. HENT: Negative for congestion, ear pain and sore throat. Eyes: Negative for discharge and redness. Respiratory: Negative for cough and shortness of breath. Cardiovascular: Negative for chest pain. Gastrointestinal: Negative for abdominal pain, nausea and vomiting. Genitourinary: Negative for decreased urine volume. Musculoskeletal: Negative for back pain and neck pain. Right axillae pain. Skin: Positive for rash.    Neurological:

## 2018-11-05 NOTE — ED PROVIDER NOTES
[facility] ED  [address] [place] [address]  Phone: [phone]  Fax: [phone]      Attending Physician Attestation    I performed a history and physical examination of the patient and discussed management with the mid level provider. I reviewed the mid level provider's note and agree with the documented findings and plan of care. Any areas of disagreement are noted on the chart. I was personally present for the key portions of any procedures. I have documented in the chart those procedures where I was not present during the key portions. I have reviewed the emergency nurses triage note. I agree with the chief complaint, past medical history, past surgical history, allergies, medications, social and family history as documented unless otherwise noted below. Documentation of the HPI, Physical Exam and Medical Decision Making performed by mid level providers is based on my personal performance of the HPI, PE and MDM. For Physician Assistant/ Nurse Practitioner cases/documentation I have personally evaluated this patient and have completed at least one if not all key elements of the E/M (history, physical exam, and MDM). Additional findings are as noted. CHIEF COMPLAINT       Chief Complaint   Patient presents with    Lymphadenopathy     RUE axilla area drainage from lymph nodes, s/s started this past Saturday         HISTORY OF PRESENT ILLNESS    Cesar Gandhi is a 80 y.o. female who presents with right arm swelling, swollen lymph nodes, history of breast CA. PAST MEDICAL HISTORY    has a past medical history of Anxiousness; Depressed; High cholesterol; History of appetite changes; Invasive ductal carcinoma of breast ([address] Lymphedema; Nausea & vomiting; Nervous; Wears glasses; and Weight gain. SURGICAL HISTORY      has a past surgical history that includes Tonsillectomy and adenoidectomy (Bilateral, 1940); Appendectomy (12/2000);  Cataract removal with implant (Right, 11/01/2016); Cataract removal with implant (Left, 12/06/2016); and Cataract removal with implant (Left, 12/06/2015). CURRENT MEDICATIONS       Previous Medications    PREDNISONE (DELTASONE) 10 MG TABLET    Take 10 mg by mouth daily    PROPRANOLOL (INDERAL) 10 MG TABLET    Take 10 mg by mouth daily       ALLERGIES     has No Known Allergies. FAMILY HISTORY     indicated that the status of her mother is unknown. She indicated that the status of her father is unknown.      family history includes Heart Failure in her mother; Hypertension in her father and mother. SOCIAL HISTORY      reports that she has never smoked. She has never used smokeless tobacco. She reports that she does not drink alcohol or use drugs. PHYSICAL EXAM     INITIAL VITALS:  weight is 56.2 kg (124 lb). Her oral temperature is 98.1 °F (36.7 °C). Her blood pressure is 163/85 (abnormal) and her pulse is 65. Her respiration is 15 and oxygen saturation is 97%. The head is normocephalic. Ears, nose, throat, all without obvious masses please or deformity on the neck on the right side. She is noted to have a supraclavicular lymph node that is hard to palpation the chest.  She is noted to have skin type changes and hard tissue in through the right breast, going into the right axilla, suggestive of metastatic breast cancer. Cardiac S1, S2 with regular rate and rhythm. Pulmonary clear to auscultation bilateral.  Abdomen soft, nontender, nondistended with positive bowel sounds. Extremities warm and dry. Good pulses motor sensation throughout. Patient is noted to have swollen and hard lymph nodes in the right axilla, right supraclavicular region noted to have hard and indurated tissue of the right breast, all suggestive of metastatic breast cancer. Neurologic no focal deficits are appreciated    I did discuss various options with the patient.   The patient would like to know further what exactly the cause of this is what

## 2018-11-05 NOTE — PROGRESS NOTES
[facility]  Lymphedema Services  Daily Progress Note     Date: 18  Patients Name: Barb Tanner  :34  Gender: female  RTR:[id number]  Fidelia Aden supplement-BMN  Referring Marc Alonzo MD  Time: 3:35-4:30  Visit number:20     Treatment Charges:     Minutes      Units      Evaluation              Low              Moderate              High          Manual Therapy 55 4      Therapeutic Activity          Other               Total Treatment Time    55 4      Daily Charge: $      90.17  Previous Total:   1850.81  Current Total:$  1940.98     Patient is a _80_year old []male [x]female referred to the Lymphedema Clinic with a diagnosis of RUE and UOQ of 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  Pt diagnosed with R breast CA in  with a mild progression of the metastatic disease around the R breast since that time.  Pt has been treated exclusively through homeopathic and natural means which is contrary to recommendations by typical allopathic medicine.  Pt has advanced intraductal carcinoma with progression that involves enlargement of lymph nodes in the supraclavicular fossa and axillary region.   Pt lives with supportive spouse, uses cane for stability during ambulation.  Pt is independent with ADLs.            ICD 10 Code:  [x] I89.0   Secondary lymphedema, not otherwise classified  [] Q82.0  Primary lymphedema (congenital) including lymphedema tarda (> 36yo)  [] I97.2   Secondary lymphedema d/t postmastectomy  [] I87.2/L97.929(L)  CVI with 6 mo non-healing venous stasis ulcer  [] I87.2/L97.919(R) CVI with 6 mo non-healing venous stasis ulcer  [] E88.2 lipomatosis NOS   [x] C50.911 Malignant neoplasm of R breast     Restrictions/Precautions:   [x] No blood pressure/blood draw in: [] Left Upper Extremity [x] Right Upper Extremity      [x] Fall Risk       [x] No [x] Yes [] No                            FLWADJQ: LOYT cane for support, R posterior hip painful  Functional Status:  Self Care:                     [x] Independent  [] Needs Assist  [] Dependent   Home Maintenance:    [] Independent  [x] Needs Assist  [] Dependent  Comments:  Fatigue:                        [] None identified          [x] Minimum        [] Moderate        [] Significant  Comments:  Psychosocial Status:  [x] Confident, independent          [] limited ADLs   [] stress, isolation, fear of future          [] sense of loss of control over life  AAO x 3:                     [x] Yes [] No                       Comment: memory loss, looks to daughter to answer questions, requires frequent repetition of instructions  Domestic Concern: none noted, supportive daughters assists as needed  Suggested Professional Referral:     Measurements Upper Extremity  Measurements are made in 4 cm increments and are circumferential. Fingers are measured at base. CM Right Left   20 34     16 33     12 31     8 30.5     4 29     Olecranon 25     20 24     16 23.5     12 21.5     8 18.5     4 17     Wrist 16     Dorsum 20.5     SF 5.5     RF 6     MF 6     IF 6     TH 6.5     Total 353. 5        *Trunk measurements:  90 cm upper chest (above breasts), 92 cm lower chest ( below breasts).   Assessment  Knowledge of home program:             [] Good [x] Fair                 [] Poor  Family can assist with programming: [] Yes                 [x] No  Other:   Instructions for patient:                       [x] Verbal [x] Demonstration           [] Written  Instructions addressed: Pt arrived with  who remained in waiting room. Glo Gitelman examined and measured. Pitting edema and fibrosis noted to R lower arm, medial cubital region and posterior upper arm; swelling to R shoulder region superior to compression sleeve has increased and become more uncomfortable.  R axillary nodes continue to  Increase in size and fibrosis and density; the ability to stretch the skin during MLD. Severe fibrosis is affecting pt function, movement  and ability to perform basic ADLs, fibrosis to supraclavicular nodes limits head movement, simple exercises recommended to facilitate greater AROM.  Puffiness noted to dorsum of R hand. Pt as been compliant with all treatment strategies, and is participating in clinical treatment 2-3x/wk consistently. Home program includes:  PATIENT IS EATING A CLEAN DIET WITHOUT DAIRY AND SUGAR. LOW IN SODIUM AND LOW IN CARBOHYDRATES. PATIENT IS COMPLETING SELF MLD TO THE BEST OF HER ABILITY MULTIPLE X/DAY, BUT REPORTS DIFFICULTY  DUE TO DECREASED AROM IN R UE AFFECTING ABILITY TO REACH CERTAIN AREAS DURING LYMPH NODE STIMULATION AND SELF MLD.         Script written for [employer] and Farkylie OTS lite compression sleeve to be issued to pt's daughter to be purchased through her work. Pt is limited by out-of-pocket expenses.  Pt appeared to have a good understanding of the information presented and the plan recommended. All subsequent appointments are to be scheduled in [place] to reduce chance of pt confusion.  Pt very pleased with therapy services.   OT to follow.     Compression Garment recommendation for medical management: Kaitlin meléndezap OTS Lite compression sleeve, 20-30mmhg size medium/long                                                                                                                Jaqueline Lite Hand Gauntlet, beige, medium, [id number] to pt, daughter to obtain through her work  [x] 20/30 mmHg    [] 30/40 mmHg   [] 40/50 mmHg    [] 50/60 mmHg  Comments:Lilaer ordering through her work.     Lymphedema Stage: Per ISL Guidelines  [] 0 - Subclinical with no evidence on physical exam  [] 1 - Early Stage, swelling and pitting edema, subsides with limb elevation  [x] 2 - More advanced, fibrosis resulting in non-pitting edema, does not respond to elevation, thickening of the tissues  [] 3 - Elephantiasis, pitting absent, huge limbs with dry, scaly, blistered, warty skin changes, fluid may be leaking with skin infections common. Acanthosis (deep body folds).      Rehabilitation Potential: [] Good           [x] Fair                 [] Poor  Comment: mild/moderate memory loss noted, pt requires extra time and multiple reminders  Response to treatment:pt receptive to education  Patients Goal:reduce swelling and pain     Therapy Goals  Pt will be seen 1-3 times a week to address:  [x] 1) Patient will be educated and express understanding of causative factors for Lymphedema, prevention of exacerbations, skin care, exercise, self massage, self bandaging (if indicated), and compression garment application (if indicated). STG: Every treatment  LT__ weeks  [x] 2) Patient or caregiver will consistently follow home programming instructions from appointment to appointment including bandaging, self massage, exercise or any additional intervention recommended. STG:_6_ weeks   LTG: _12__ weeks  [x] 3) Patient will realize limb reduction as evidenced by decrease in limb measurements. STG: 3% reduction__6_ weeks   LTG: __5%__ reduction _12__ weeks  [x] 4). Patient will be referred for fitting of a compression garment or alternative compression when maximum results are achieved. (Indication are no further changes in numerical status or changes in tissue integrity.) LTG: _12_ weeks  [] 5). Pt will increase his/her level of function as demonstrated by increased ability to _____________.  LTG: ___ weeks  [] 6). _________ STG: __ weeks   LTG: __ weeks     Plan: Continue to address:     []Bandaging  [] Self Bandaging/Family Assist  [x]MLD  [x]Self Massage  []Exercise  [x]Education  [x]Obtain referral for garments  []Medical Hold  []Discharge to Home Program     G-Codes:    [x] Eval _18___ [x] 10th visit 10/10/18  []  visit ____ [] 30 visit____  [] D/C ____      Functional Limitation: EVAL 34/, 47%, 10th visit

## 2018-11-05 NOTE — ED NOTES
Patient returns from CT to room  Nondistressed  GCS=15    Will continue to monitor  Family remains at bedside    Call light within reach     Erica Velasco RN  11/05/18 0252

## 2018-11-05 NOTE — ED NOTES
Patient to CT at this time     [facility]ord[facility]ix[facility]Ge[facility]  11/05/18 [id number]

## 2018-11-05 NOTE — PROGRESS NOTES
region, lateral trunk/abdomin  Edema  Edema Location:RUE, chest wall, R lateral neck, R axilla, R lateral scapular region  [] 1+ Edema       [] 2+ Edema        [x] 3+ Edema      [] 4+ Edema  Edema Location:  [] 1+ Edema       [] 2+ Edema        [] 3+ Edema      [] 4+ Edema  Edema Location:  [] 1+ Edema       [] 2+ Edema        [] 3+ Edema      [] 4+ Edema     When did swelling start? Following diagnosis of Breast CA  What are the potential triggers for swelling? None noted  Aggravating factors: significant progression of the disease  Relieving factors: MLD, wearing compression sleeve to RUE     Subjective: \"I feel like my neck is getting stiffer. \"  Objective:   [x] Measurement [x]Manual Lymph Drainage        [] Bandaging      [] Kinesiotaping  [x] Education                   [x]Self Massage              []Self Bandaging  [] Exercise                     [] Wound Care               [] Hygiene          [x]  wearing compression sleeve to RUE  Other:      Education Included:  [x] General Lymphedema Information                [x] Dos and Donts          [x] Self Massage  []Home Exercises          [] Self Bandaging          [] Kinesiotaping             []  Hygiene  Learner: [x] Patient         [] Spouse                       []Other               [] Family  Method: [x] Verbal          [x] Demonstration           [] Handouts         [x] Exercise-neck mobility  Response:       [x] Verbalized understanding      [x] Demonstrated understanding                            [] Needs assist              [] No understanding     Physical Status:  Range of motion: Deficits [x] Yes [] No                            CWXBNVA: limited shoulder AROM d/t arthritis  Strength:              Deficits [] Yes [x] No                            LVICXBY:  Sensation:            Deficits [] Yes [x] No                            PFCWXSM:  Transfer:               Deficits [] Yes [x] No                            DDEREHC:  Ambulation:          Deficits [x] Yes [] No                            TCJQOAT: PCIQ cane for support, R posterior hip painful  Functional Status:  Self Care:                     [x] Independent  [] Needs Assist  [] Dependent   Home Maintenance:    [] Independent  [x] Needs Assist  [] Dependent  Comments:  Fatigue:                        [] None identified          [x] Minimum        [] Moderate        [] Significant  Comments:  Psychosocial Status:  [x] Confident, independent          [] limited ADLs   [] stress, isolation, fear of future          [] sense of loss of control over life  AAO x 3:                     [x] Yes [] No                       Comment: memory loss, looks to daughter to answer questions, requires frequent repetition of instructions  Domestic Concern: none noted, supportive daughters assists as needed  Suggested Professional Referral:     Measurements Upper Extremity  Measurements are made in 4 cm increments and are circumferential. Fingers are measured at base. CM Right Left   20 35.5     16 27.5     12 29.5     8 30     4 28.5     Olecranon 24.5     20 25     16 22.5     12 19.5     8 17.5     4 16     Wrist 15.5     Dorsum 21.5     SF 5.5    RF 6     MF 6     IF 6     TH 6.5     Total 333        *Trunk measurements:  90 cm upper chest (above breasts), 92 cm lower chest ( below breasts).   Assessment  Knowledge of home program:             [] Good [x] Fair                 [] Poor  Family can assist with programming: [] Yes                 [x] No  Other:   Instructions for patient:                       [x] Verbal [x] Demonstration           [] Written  Instructions addressed: Pt arrived with  who remained in waiting room.  RUE examined and measured. Although swelling to RUE has decreased d/t wearing the compression sleeve daily, the swelling to the R axillary lymph nodes has increased with a split in the tissue observed; tissue inflamed and weeping malodorous fluid.   Pt reports that the weeping began over the

## 2018-11-06 NOTE — ED NOTES
Dr Noah Elliott at bedside at this time speaking with patient and family.      Theresa Lord RN  11/05/18 [id number]

## 2018-11-06 NOTE — ED NOTES
Patients daughter arrives at bedside    Dr Ramey Staff returns to speak with patient and family regarding further plan of care and processes    Additional warm blankets applied for comfort  Call light within reach    Will continue to monitor     Isidro Pena, RN  11/05/18 2526

## 2018-11-07 ENCOUNTER — HOSPITAL ENCOUNTER (OUTPATIENT)
Dept: OCCUPATIONAL THERAPY | Age: 83
Setting detail: THERAPIES SERIES
Discharge: HOME | End: 2018-11-07
Payer: MEDICARE

## 2018-11-07 PROCEDURE — 97530 THERAPEUTIC ACTIVITIES: CPT

## 2018-11-07 PROCEDURE — 97140 MANUAL THERAPY 1/> REGIONS: CPT

## 2018-11-08 ENCOUNTER — HOSPITAL ENCOUNTER (OUTPATIENT)
Dept: OCCUPATIONAL THERAPY | Age: 83
Setting detail: THERAPIES SERIES
Discharge: HOME | End: 2018-11-08
Payer: MEDICARE

## 2018-11-08 PROCEDURE — 97140 MANUAL THERAPY 1/> REGIONS: CPT

## 2018-11-08 NOTE — PROGRESS NOTES
[] Yes                 Intervention: uses cane for support at times  []Other:     Pain:  [] No    [x] Yes  Location:R breast, axilla region, scapula, neck  Pain Rating (0-10 pain scale):6/10  Pain Description:  [x] Achy,worse at end of day  [] distention, discomfort  [x] sensitive with palpation  [] guarding, tingling, numbness      [x] Hypersensitivity    [] Radiation fibrosis  Interruption of Treatment [] Yes  [x] No     Medications:  [x]See charted information     Past Medical History:  [x] See charted information  Polyarthritis, systemic lupus erythematosus, degenerative disc disease and scoliosis, major depressive disorder     Conservative Treatments Utilized in the Past:  none  [] Compression Garments         [] Bandaging      [] Elevation         [] Exercise      []Self MLD  Frequency/Duration:     Lymphedema Contraindication Assessment:  [] CHF                            []DVT     []Kidney Problems  []Cellulitis (warmth, tenderness, less defined erythematous borders)   [] Erysipelas (fiery/red plague, raised defined borders)             Clearance needed:     []Yes                  [x]No                    [] Obtained  Physician giving Clearance:     Cancer history             [x]Yes                  []No  Location:R breast  When Diagnosed: 2014  Surgery:                      []Yes                  [x]No                    Details:none, pt declined all traditional intevention  Node Dissection:         []Yes                  [x]No                    Where removed:  Chemotherapy:           []Yes                  [x]No                    []Pending           []In progress      []Completed  Radiation:                    []Yes                  [x]No                    []Pending           []In progress      []Completed  Comments: pt using homeopathic treatment only     Came to Clinic  [] Bandaged                                        []Unbandaged                        [] With Stocking                                  [] With Sleeve              [] Unna Boot                                       [x]With alternative compression: RUE compression sleeve in place upon arrival  [] Kinesiotaped  [] R. Hand                      [] Left Hand   [] R. Arm                        [] Left Arm   [] R. Leg             [] Left Leg                         [] R.  Breast                    [] L Breast                        []Right Upper Back    [] L Upper Back      [] Other:     Skin Integrity/Appearance- location: R axilla, R UE, chest wall, R lateral neck, lateral scapular region, abdomin  [] Normal           [x] Dry                  []Moist/weeping/blisters     [x] Warmth/mild/moderate inflamation  [x] Brawny/hardened       [] [place] hump-dorsum        [] Rash        [x] Reddness  [x] Crusted/bumpy       [x] Firm edema     [] Loose, lobular     []  Soft, doughy  Other Symptoms:  [] Hyperkeratosis           [] Hyperplasia             [] Hyperpigmentation   [] Skin breakdown with lymphorrhea, neck             [] Recent cellulitis              [x] Fibrosis  RUE/chest wall        [] Papillomatosis        [] fatty lobules        [] Elephantiasis  [x] Truncal/abdominal swelling   [x] Chest/axillary swelling     [] Genital swelling  [x] Impaired ROM       [] Impaired mobility           [] Other  [] Stemmers sign:         [] Scars:                     [] Thick/rigid  [] Raised   [] Flattened   [] Softened  [] Mobility of scar:      [] Poor           [] Fair        [] Good         [] Normal       [] Other:    Wounds: none  [] Shallow, painlful venous   [] skin denudement (top layer removed by moisture)  [] Radiation burns/ulcers     [] Superficial abrasions  [] Excoriation (compulsive picking) [] Being addressed by Laura Lawson     Color  [] Normal           [] Mottled           [x] Flushed/erythema      [] Other/Description  Location of problem area/s: RUE, chest wall, R lateral neck, R axilla, R lateral scapular region, lateral trunk/abdomin  Edema  Edema Location:RUE, chest wall, R lateral neck, R axilla, R lateral scapular region  [] 1+ Edema       [] 2+ Edema        [x] 3+ Edema      [] 4+ Edema  Edema Location:  [] 1+ Edema       [] 2+ Edema        [] 3+ Edema      [] 4+ Edema  Edema Location:  [] 1+ Edema       [] 2+ Edema        [] 3+ Edema      [] 4+ Edema     When did swelling start? Following diagnosis of Breast CA  What are the potential triggers for swelling?  None noted  Aggravating factors: significant progression of the disease  Relieving factors: MLD, wearing compression sleeve to RUE     Subjective: \" My hip is getting worse and I can't sleep with my arm and hip now bothering me\"  Objective:   [x] Measurement             [x]Manual Lymph Drainage        [] Bandaging      [] Kinesiotaping  [x] Education                   [x]Self Massage              []Self Bandaging  [] Exercise                     [] Wound Care               [] Hygiene          [x]  wearing compression sleeve to RUE  Other:      Education Included:  [x] General Lymphedema Information                [x] Dos and Donts          [x] Self Massage  []Home Exercises          [] Self Bandaging          [] Kinesiotaping             []  Hygiene  Learner: [x] Patient         [] Spouse                       []Other               [] Family  Method: [x] Verbal          [x] Demonstration           [] Handouts         [x] Exercise-neck mobility  Response:       [x] Verbalized understanding      [x] Demonstrated understanding                            [] Needs assist              [] No understanding     Physical Status:  Range of motion: Deficits [x] Yes [] No                            YQOACVA: limited shoulder AROM d/t arthritis  Strength:              Deficits [] Yes [x] No                            PMMGVSA:  Sensation:            Deficits [] Yes [x] No                            GYSHXVJ:  Transfer:               Deficits [] Yes [x] No intervention recommended. STG:_6_ weeks   LTG: _12__ weeks  [x] 3) Patient will realize limb reduction as evidenced by decrease in limb measurements. STG: 3% reduction__6_ weeks   LTG: __5%__ reduction _12__ weeks  [x] 4). Patient will be referred for fitting of a compression garment or alternative compression when maximum results are achieved. (Indication are no further changes in numerical status or changes in tissue integrity.) LTG: _12_ weeks  [] 5). Pt will increase his/her level of function as demonstrated by increased ability to _____________.  LTG: ___ weeks  [] 6). _________ STG: __ weeks   LTG: __ weeks     Plan: Continue to address:     []Bandaging  [] Self Bandaging/Family Assist  [x]MLD  [x]Self Massage  []Exercise  [x]Education  []Obtain referral for garments  []Medical Hold  []Discharge to Home Program     G-Codes:    [x] Eval _8/31/18___ [x] 10th visit 10/10/18  [] 20th visit ____ [] 30th visit____  [] D/C ____      Functional Limitation: EVAL 34/72, 47%, 10th visit 41/72 56%  Functional Assessment Used: Lymphedema Life Impact Scale (LLIS)  Current Status Modifier: CK  Goal Status Modifier: CJ  Discharge Status Modifier:     Physican signature is required for :  [] Plan of Care  [x]Medicare Requirement    Ambrosio Walker MOT, OTR/L, CLT

## 2018-11-13 ENCOUNTER — APPOINTMENT (OUTPATIENT)
Dept: OCCUPATIONAL THERAPY | Age: 83
End: 2018-11-13
Payer: MEDICARE

## 2018-11-14 ENCOUNTER — HOSPITAL ENCOUNTER (OUTPATIENT)
Dept: OCCUPATIONAL THERAPY | Age: 83
Setting detail: THERAPIES SERIES
Discharge: HOME | End: 2018-11-14
Payer: MEDICARE

## 2018-11-14 PROCEDURE — 97530 THERAPEUTIC ACTIVITIES: CPT

## 2018-11-14 PROCEDURE — 97140 MANUAL THERAPY 1/> REGIONS: CPT

## 2018-11-14 PROCEDURE — G8987 SELF CARE CURRENT STATUS: HCPCS

## 2018-11-14 PROCEDURE — G8979 MOBILITY GOAL STATUS: HCPCS

## 2018-11-14 PROCEDURE — G8978 MOBILITY CURRENT STATUS: HCPCS

## 2018-11-16 ENCOUNTER — HOSPITAL ENCOUNTER (OUTPATIENT)
Dept: OCCUPATIONAL THERAPY | Age: 83
Setting detail: THERAPIES SERIES
Discharge: HOME | End: 2018-11-16
Payer: MEDICARE

## 2018-11-16 PROCEDURE — 97140 MANUAL THERAPY 1/> REGIONS: CPT

## 2018-11-16 NOTE — PROGRESS NOTES
[x] No               [] Yes                 Intervention: uses cane for support at times  []Other:     Pain:  [] No    [x] Yes  Location:R breast, axilla region, scapula, neck  Pain Rating (0-10 pain scale):6/10  Pain Description:  [x] Achy,worse at end of day  [] distention, discomfort  [x] sensitive with palpation  [] guarding, tingling, numbness      [x] Hypersensitivity    [] Radiation fibrosis  Interruption of Treatment [] Yes  [x] No     Medications:  [x]See charted information     Past Medical History:  [x] See charted information  Polyarthritis, systemic lupus erythematosus, degenerative disc disease and scoliosis, major depressive disorder     Conservative Treatments Utilized in the Past:  none  [] Compression Garments         [] Bandaging      [] Elevation         [] Exercise      []Self MLD  Frequency/Duration:     Lymphedema Contraindication Assessment:  [] CHF                            []DVT     []Kidney Problems  []Cellulitis (warmth, tenderness, less defined erythematous borders)   [] Erysipelas (fiery/red plague, raised defined borders)             Clearance needed:     []Yes                  [x]No                    [] Obtained  Physician giving Clearance:     Cancer history             [x]Yes                  []No  Location:R breast  When Diagnosed: 2014  Surgery:                      []Yes                  [x]No                    Details:none, pt declined all traditional intevention  Node Dissection:         []Yes                  [x]No                    Where removed:  Chemotherapy:           []Yes                  [x]No                    []Pending           []In progress      []Completed  Radiation:                    []Yes                  [x]No                    []Pending           []In progress      []Completed  Comments: pt using homeopathic treatment only     Came to Clinic  [] Bandaged                                        []Unbandaged                        [] With Stocking                                  [] With Sleeve              [] Unna Boot                                       [x]With alternative compression: RUE compression sleeve in place upon arrival  [] Kinesiotaped  [] R. Hand                      [] Left Hand   [] R. Arm                        [] Left Arm   [] R. Leg             [] Left Leg                         [] R.  Breast                    [] L Breast                        []Right Upper Back    [] L Upper Back      [] Other:     Skin Integrity/Appearance- location: R axilla, R UE, chest wall, R lateral neck, lateral scapular region, abdomin  [] Normal           [x] Dry                  [x]Moist/weeping/blisters     [x] Warmth/mild/moderate inflamation  [x] Brawny/hardened       [] [place] hump-dorsum        [] Rash        [x] Reddness  [x] Crusted/bumpy       [x] Firm edema     [] Loose, lobular     []  Soft, doughy  Other Symptoms:  [] Hyperkeratosis           [] Hyperplasia             [] Hyperpigmentation   [x] Skin breakdown with lymphorrhea, neck             [] Recent cellulitis              [x] Fibrosis  RUE/chest wall        [] Papillomatosis        [] fatty lobules        [] Elephantiasis  [x] Truncal/abdominal swelling   [x] Chest/axillary swelling     [] Genital swelling  [x] Impaired ROM       [] Impaired mobility           [] Other  [] Stemmers sign:         [] Scars:                     [] Thick/rigid  [] Raised   [] Flattened   [] Softened  [] Mobility of scar:      [] Poor           [] Fair        [] Good         [] Normal       [] Other:    Wounds: none  [x] Shallow, painful venous   [] skin denudement (top layer removed by moisture)  [] Radiation burns/ulcers     [] Superficial abrasions  [] Excoriation (compulsive picking) [] Being addressed by Laura Lawson     Color  [] Normal           [] Mottled           [x] Flushed/erythema      [] Other/Description  Location of problem area/s: RUE, chest wall, R lateral neck, R axilla, R lateral scapular                            FVKHHRV:  Ambulation:          Deficits [x] Yes [] No                            Comment: uses cane for support, R posterior hip painful  Functional Status:  Self Care:                     [x] Independent  [] Needs Assist  [] Dependent   Home Maintenance:    [] Independent  [x] Needs Assist  [] Dependent  Comments:  Fatigue:                        [] None identified          [x] Minimum        [] Moderate        [] Significant  Comments:  Psychosocial Status:  [x] Confident, independent          [] limited ADLs   [] stress, isolation, fear of future          [] sense of loss of control over life  AAO x 3:                     [x] Yes [] No                       Comment: memory loss, looks to daughter to answer questions, requires frequent repetition of instructions  Domestic Concern: none noted, supportive daughters assists as needed  Suggested Professional Referral:     Measurements Upper Extremity  Measurements are made in 4 cm increments and are circumferential. Fingers are measured at base. CM Right Left   20 36     16 35.5     12 27     8 28     4 29     Olecranon 24.5     20 23.5     16 22.5     12 20     8 17     4 16     Wrist 16     Dorsum 20     SF 5     RF 5.5     MF 6     IF 6     TH 6     Total 343.25        *Trunk measurements:  91 cm upper chest (above breasts), 86 cm lower chest ( below breasts).   Assessment  Knowledge of home program:             [] Good [x] Fair                 [] Poor  Family can assist with programming: [] Yes                 [x] No  Other:   Instructions for patient:                       [x] Verbal [x] Demonstration           [] Written  Instructions addressed:  Pump was trialed with Flexitouch. Patient concerned with the pump pushing fluid into chest with her chest being a main concern with her lymphedema. Patient expresses after trial the upper arm felt heavy and visually looked more swollen.  Patient verbalizes being \"scared\" now to have fluid exam  [] 1 - Early Stage, swelling and pitting edema, subsides with limb elevation  [x] 2 - More advanced, fibrosis resulting in non-pitting edema, does not respond to elevation, thickening of the tissues  [] 3 - Elephantiasis, pitting absent, huge limbs with dry, scaly, blistered, warty skin changes, fluid may be leaking with skin infections common. Acanthosis (deep body folds).      Rehabilitation Potential: [] Good           [x] Fair                 [] Poor  Comment: mild/moderate memory loss noted, pt requires extra time and multiple reminders  Response to treatment:pt receptive to education  Patients Goal:reduce swelling and pain     Therapy Goals- 12 weeks 18-18  Pt will be seen 1-3 times a week to address:  [x] 1) Patient will be educated and express understanding of causative factors for Lymphedema, prevention of exacerbations, skin care, exercise, self massage, self bandaging (if indicated), and compression garment application (if indicated). STG: Every treatment  LT__ weeks  [x] 2) Patient or caregiver will consistently follow home programming instructions from appointment to appointment including bandaging, self massage, exercise or any additional intervention recommended. STG:_6_ weeks   LTG: _12__ weeks  [x] 3) Patient will realize limb reduction as evidenced by decrease in limb measurements. STG: 3% reduction__6_ weeks   LTG: __5%__ reduction _12__ weeks  [x] 4). Patient will be referred for fitting of a compression garment or alternative compression when maximum results are achieved. (Indication are no further changes in numerical status or changes in tissue integrity.) LTG: _12_ weeks  [] 5). Pt will increase his/her level of function as demonstrated by increased ability to _____________.  LTG: ___ weeks  [] 6). _________ STG: __ weeks   LTG: __ weeks     Plan: Continue to address:     []Bandaging  [] Self Bandaging/Family Assist  [x]MLD  [x]Self Massage  []Exercise  [x]Education

## 2018-11-19 ENCOUNTER — HOSPITAL ENCOUNTER (OUTPATIENT)
Dept: OCCUPATIONAL THERAPY | Age: 83
Setting detail: THERAPIES SERIES
Discharge: HOME | End: 2018-11-19
Payer: MEDICARE

## 2018-11-19 PROCEDURE — 97140 MANUAL THERAPY 1/> REGIONS: CPT

## 2018-11-19 NOTE — PROGRESS NOTES
[x] No               [] Yes                 Intervention: uses cane for support d/t painful arthritic R hip joint  []Other:     Pain:  [] No    [x] Yes  Location:R breast, axilla region, scapula, supraclavicular fossa of neck  Pain Rating (0-10 pain scale):6/10  Pain Description:  [x] Achy,worse at end of day  [] distention, discomfort  [x] sensitive with palpation  [] guarding, tingling, numbness      [x] Hypersensitivity    [] Radiation fibrosis  Interruption of Treatment [] Yes  [x] No     Medications:  [x]See charted information     Past Medical History:  [x] See charted information  Polyarthritis, systemic lupus erythematosus, degenerative disc disease and scoliosis, major depressive disorder     Conservative Treatments Utilized in the Past:  none  [] Compression Garments         [] Bandaging      [] Elevation         [] Exercise      []Self MLD  Frequency/Duration:     Lymphedema Contraindication Assessment:  [] CHF                            []DVT     []Kidney Problems  []Cellulitis (warmth, tenderness, less defined erythematous borders)   [] Erysipelas (fiery/red plague, raised defined borders)             Clearance needed:     []Yes                  [x]No                    [] Obtained  Physician giving Clearance:     Cancer history             [x]Yes                  []No  Location:R breast  When Diagnosed: 2014  Surgery:                      []Yes                  [x]No                    Details:none, pt declined all traditional intevention  Node Dissection:         []Yes                  [x]No                    Where removed:  Chemotherapy:           []Yes                  [x]No                    []Pending           []In progress      []Completed  Radiation:                    []Yes                  [x]No                    []Pending           []In progress      []Completed  Comments: pt using homeopathic treatment only     Came to white foam to protect skin integrity, from wrist to approx 2cm from axilla d/t painful node protrusion. Pt encouraged to wear bandage , but remove if painful. Pt is unable to re-wrap with one hand, but bandage will be re-applied at appointment on Wednesday. OT to follow. [x] 20/30 mmHg    [] 30/40 mmHg   [] 40/50 mmHg    [] 50/60 mmHg  Comments:Trinh ordering through her work.     Lymphedema Stage: Per ISL Guidelines  [] 0 - Subclinical with no evidence on physical exam  [] 1 - Early Stage, swelling and pitting edema, subsides with limb elevation  [x] 2 - More advanced, fibrosis resulting in non-pitting edema, does not respond to elevation, thickening of the tissues  [] 3 - Elephantiasis, pitting absent, huge limbs with dry, scaly, blistered, warty skin changes, fluid may be leaking with skin infections common. Acanthosis (deep body folds).      Rehabilitation Potential: [] Good           [x] Fair                 [] Poor  Comment: mild/moderate memory loss noted, pt requires extra time and multiple reminders  Response to treatment:pt receptive to education  Patients Goal:reduce swelling and pain     Therapy Goals- 12 weeks 18-18  Pt will be seen 1-3 times a week to address:  [x] 1) Patient will be educated and express understanding of causative factors for Lymphedema, prevention of exacerbations, skin care, exercise, self massage, self bandaging (if indicated), and compression garment application (if indicated). STG: Every treatment  LT__ weeks  [x] 2) Patient or caregiver will consistently follow home programming instructions from appointment to appointment including bandaging, self massage, exercise or any additional intervention recommended. STG:_6_ weeks   LTG: _12__ weeks  [x] 3) Patient will realize limb reduction as evidenced by decrease in limb measurements. STG: 3% reduction__6_ weeks   LTG: __5%__ reduction _12__ weeks  [x] 4).  Patient will be referred for fitting of a

## 2018-11-21 ENCOUNTER — HOSPITAL ENCOUNTER (OUTPATIENT)
Dept: OCCUPATIONAL THERAPY | Age: 83
Setting detail: THERAPIES SERIES
Discharge: HOME | End: 2018-11-21
Payer: MEDICARE

## 2018-11-21 PROCEDURE — 97530 THERAPEUTIC ACTIVITIES: CPT

## 2018-11-21 PROCEDURE — 97140 MANUAL THERAPY 1/> REGIONS: CPT

## 2018-11-21 NOTE — PROGRESS NOTES
[facility]  Lymphedema Services  Daily Progress Note     Date: 18  Patients Name: Barb Tanner  :34  Gender: female   [id number]  [facility] supplement-BMN  Referring Janeen Myers MD  Time: 5660-5226  Visit number:27      Treatment Charges:     Minutes      Units      Evaluation              Low              Moderate              High          Manual Therapy 60 4      Therapeutic Activity  9  1      Other               Total Treatment Time    69 5      Daily Charge: $   053.52  Previous Total:  2453.80  Current Total:$  2577.02     Patient is a _80_year old []male [x]female referred to the Lymphedema Clinic with a diagnosis of RUE and UOQ of R chest wall Lymphedema d/t Advanced Breast Cancer at the request of Dr Live Rolon.  Pt diagnosed with R breast CA in  with a mild progression of the metastatic disease around the R breast since that time.  Pt has been treated exclusively through homeopathic and natural means which is contrary to recommendations by typical allopathic medicine.  Pt has advanced intraductal carcinoma with progression that involves enlargement of lymph nodes in the supraclavicular fossa and axillary region.   Pt lives with supportive spouse, uses cane for stability during ambulation.  Pt is independent with ADLs.            ICD 10 Code:  [x] I89.0   Secondary lymphedema, not otherwise classified  [] Q82.0  Primary lymphedema (congenital) including lymphedema tarda (> 34yo)  [] I97.2   Secondary lymphedema d/t postmastectomy  [] I87.2/L97.929(L)  CVI with 6 mo non-healing venous stasis ulcer  [] I87.2/L97.919(R) CVI with 6 mo non-healing venous stasis ulcer  [] E88.2 lipomatosis NOS   [x] C50.911 Malignant neoplasm of R breast     Restrictions/Precautions:   [x] No blood pressure/blood draw in: [] Left Upper Extremity [x] Right Upper Extremity      [x] Fall Risk       [x] No

## 2018-11-27 ENCOUNTER — HOSPITAL ENCOUNTER (OUTPATIENT)
Dept: OCCUPATIONAL THERAPY | Age: 83
Setting detail: THERAPIES SERIES
Discharge: HOME | End: 2018-11-27
Payer: MEDICARE

## 2018-11-27 PROCEDURE — 97140 MANUAL THERAPY 1/> REGIONS: CPT

## 2018-11-27 NOTE — FLOWSHEET NOTE
integrity.) LTG: _12_ weeks  [] 5). Pt will increase his/her level of function as demonstrated by increased ability to _____________.  LTG: ___ weeks  [] 6). _________ STG: __ weeks   LTG: __ weeks     Plan: Continue to address:     []Bandaging  [] Self Bandaging/Family Assist  [x]MLD  [x]Self Massage  []Exercise  [x]Education  [x]Obtain lymphatic compression pump  []Medical Hold  []Discharge to Home Program     G-Codes:    [x] Eval _8/31/18___ [x] 10th visit 10/10/18      [x] 20th visit 11/14/18      [] 30th visit____  [] D/C ____      Functional Limitation: EVAL 34/72, 47%, 10th visit 41/72 56%, 20th visit 54/72  Functional Assessment Used: Lymphedema Life Impact Scale (LLIS)  Current Status Modifier: CL  Goal Status Modifier: CJ  Discharge Status Modifier:     Physican signature is required for :  [] Plan of Care  [x]Medicare Requirement    Minnie Anthony MOT, OTR/L, CLT

## 2018-11-28 ENCOUNTER — HOSPITAL ENCOUNTER (OUTPATIENT)
Dept: OCCUPATIONAL THERAPY | Age: 83
Setting detail: THERAPIES SERIES
Discharge: HOME | End: 2018-11-28
Payer: MEDICARE

## 2018-11-28 PROCEDURE — 97140 MANUAL THERAPY 1/> REGIONS: CPT

## 2018-11-28 NOTE — PROGRESS NOTES
[facility]  Lymphedema Services  Daily Progress Note     Date: 18  Patients Name: Barb Tanner  :34  Gender: female   [id number]  Insurance: Medicare, [employer] supplement-BMN  Referring Sarahy Prado MD  Time: 8790-1400  Visit number:29 (LLIS next visit)    Treatment Charges:     Minutes      Units      Evaluation              Low              Moderate              High          Manual Therapy 60 4      Therapeutic Activity          Other               Total Treatment Time    60 4      Daily Charge: $    90.17  Previous Total:  2667.19  Current Total:$  2757.36    Patient is a _80_year old []male [x]female referred to the Lymphedema Clinic with a diagnosis of RUE and UOQ of R chest wall Lymphedema d/t Advanced Breast Cancer at the request of Dr Maco Moore.  Pt diagnosed with R breast CA in  with a mild progression of the metastatic disease around the R breast since that time.  Pt has been treated exclusively through homeopathic and natural means which is contrary to recommendations by typical allopathic medicine.  Pt has advanced intraductal carcinoma with progression that involves enlargement of lymph nodes in the supraclavicular fossa and axillary region.   Pt lives with supportive spouse, uses cane for stability during ambulation.  Pt is independent with ADLs.            ICD 10 Code:  [x] I89.0   Secondary lymphedema, not otherwise classified  [] Q82.0  Primary lymphedema (congenital) including lymphedema tarda (> 34yo)  [] I97.2   Secondary lymphedema d/t postmastectomy  [] I87.2/L97.929(L)  CVI with 6 mo non-healing venous stasis ulcer  [] I87.2/L97.919(R) CVI with 6 mo non-healing venous stasis ulcer  [] E88.2 lipomatosis NOS   [x] C50.911 Malignant neoplasm of R breast     Restrictions/Precautions:   [x] No blood pressure/blood draw in: [] Left Upper Extremity [x] Right Upper Extremity      [x] Fall Risk       [x] No Clinic  [] Bandaged                                        []Unbandaged                        [] With Stocking                                  [] With Sleeve              [] Unna Boot                                       [x]With alternative compression: Jaqueline Lite RUE compression sleeve with silicone band  .   [] Kinesiotaped  [] R. Hand                      [] Left Hand   [] R. Arm                        [] Left Arm   [] R. Leg             [] Left Leg                         [] R.  Breast                    [] L Breast                        []Right Upper Back    [] L Upper Back      [] Other:     Skin Integrity/Appearance- location: R axilla, R UE, chest wall, R lateral neck, lateral scapular region, abdomin  [] Normal           [x] Dry                  [x]Moist/weeping/blisters at axilla     [x] Warmth/mild/moderate inflamation  [x] Brawny/hardened       [] [place] hump-dorsum        [] Rash        [x] Reddness  [x] Crusted/bumpy       [x] Firm edema     [] Loose, lobular     []  Soft, doughy  Other Symptoms:  [] Hyperkeratosis           [] Hyperplasia             [] Hyperpigmentation   [x] Skin breakdown with lymphorrhea at R axilla             [] Recent cellulitis              [x] Fibrosis  RUE/chest wall, R supraclavicular fossa of neck        [] Papillomatosis        [] fatty lobules        [] Elephantiasis  [x] Truncal/abdominal swelling   [x] Chest/axillary swelling     [] Genital swelling  [x] Impaired AROM of neck/shoulder       [] Impaired mobility           [] Other  [] Stemmers sign:         [] Scars:                     [] Thick/rigid  [] Raised   [] Flattened   [] Softened  [] Mobility of scar:      [] Poor           [] Fair        [] Good         [] Normal       [] Other:    Wounds: none  [x] Shallow, painful   [] skin denudement (top layer removed by moisture)  [] Radiation burns/ulcers     [] Superficial abrasions  [] Excoriation (compulsive picking) [] Being addressed by [facility]     Color  [] Normal           [] Mottled           [x] Flushed/erythema      [] Other/Description  Location of problem area/s: RUE, chest wall, R lateral neck, R axilla, R lateral scapular region, lateral trunk/abdomin  Edema  Edema Location:RUE, chest wall, R lateral neck, R axilla, R lateral scapular region  [] 1+ Edema       [] 2+ Edema        [x] 3+ Edema      [] 4+ Edema  Edema Location:  [] 1+ Edema       [] 2+ Edema        [] 3+ Edema      [] 4+ Edema  Edema Location:  [] 1+ Edema       [] 2+ Edema        [] 3+ Edema      [] 4+ Edema     When did swelling start? Following diagnosis of Breast CA  What are the potential triggers for swelling?  None noted  Aggravating factors: significant progression of the disease  Relieving factors: MLD, wearing compression sleeve to RUE     Subjective: \" My hip pain is making it difficulty to sleep, my doctor said it is arthritis\"  Objective:   [x] Measurement             [x]Manual Lymph Drainage        [] Bandaging      [] Kinesiotaping  [x] Education                   [x]Self Massage              []Self Bandaging  [] Exercise                     [] Wound Care               [] Hygiene          [x]  wearing compression sleeve to RUE  Other:      Education Included:  [x] General Lymphedema Information                [x] Dos and Donts          [x] Self Massage  []Home Exercises          [] Self Bandaging          [] Kinesiotaping             []  Hygiene  Learner: [x] Patient         [] Spouse                       []Other               [] Family  Method: [x] Verbal          [x] Demonstration           [] Handouts         [x] Exercise-neck mobility  Response:       [x] Verbalized understanding      [x] Demonstrated understanding                            [] Needs assist              [] No understanding     Physical Status:  Range of motion: Deficits [x] Yes [] No                            NLIJTVL: limited shoulder AROM d/t arthritis  Strength:              Deficits evidence on physical exam  [] 1 - Early Stage, swelling and pitting edema, subsides with limb elevation  [x] 2 - More advanced, fibrosis resulting in non-pitting edema, does not respond to elevation, thickening of the tissues  [] 3 - Elephantiasis, pitting absent, huge limbs with dry, scaly, blistered, warty skin changes, fluid may be leaking with skin infections common. Acanthosis (deep body folds).      Rehabilitation Potential: [] Good           [x] Fair                 [] Poor  Comment: mild/moderate memory loss noted, pt requires extra time and multiple reminders  Response to treatment:pt receptive to education  Patients Goal:reduce swelling and pain     Therapy Goals extended for  12 additional weeks added to goals from 18-18  Pt will be seen 1-3 times a week to address:  [x] 1) Patient will be educated and express understanding of causative factors for Lymphedema, prevention of exacerbations, skin care, exercise, self massage, self bandaging (if indicated), and compression garment application (if indicated). STG: Every treatment  LT__ weeks  [x] 2) Patient or caregiver will consistently follow home programming instructions from appointment to appointment including bandaging, self massage, exercise or any additional intervention recommended. STG:_6_ weeks   LTG: _12__ weeks  [x] 3) Patient will realize limb reduction as evidenced by decrease in limb measurements. STG: 3% reduction__6_ weeks   LTG: __5%__ reduction _12__ weeks  [x] 4). Patient will be referred for fitting of a compression garment or alternative compression when maximum results are achieved. (Indication are no further changes in numerical status or changes in tissue integrity.) LTG: _12_ weeks  [] 5). Pt will increase his/her level of function as demonstrated by increased ability to _____________.  LTG: ___ weeks  [] 6). _________ STG: __ weeks   LTG: __ weeks     Plan: 12 additional weeks added to goals from 18-18  Pt will be seen 1-3 times   Continue to address:     []Bandaging  [] Self Bandaging/Family Assist  [x]MLD  [x]Self Massage  []Exercise  [x]Education  [x]Obtain lymphatic compression pump  []Medical Hold  []Discharge to Home Program     G-Codes:    [x] Eval _8/31/18___ [x] 10th visit 10/10/18      [x] 20th visit 11/14/18      [] 30th visit____  [] D/C ____      Functional Limitation: EVAL 34/72, 47%, 10th visit 41/72 56%, 20th visit 54/72  Functional Assessment Used: Lymphedema Life Impact Scale (LLIS)  Current Status Modifier: CL  Goal Status Modifier: CJ  Discharge Status Modifier:     Physican signature is required for :  [] Plan of Care   [x]Medicare Requirement     OLIVE Mckeon, CAMMIET, WARD-ANTELMO

## 2018-12-03 ENCOUNTER — HOSPITAL ENCOUNTER (OUTPATIENT)
Dept: OCCUPATIONAL THERAPY | Age: 83
Setting detail: THERAPIES SERIES
Discharge: HOME | End: 2018-12-03
Payer: MEDICARE

## 2018-12-05 ENCOUNTER — HOSPITAL ENCOUNTER (OUTPATIENT)
Dept: OCCUPATIONAL THERAPY | Age: 83
Setting detail: THERAPIES SERIES
Discharge: HOME | End: 2018-12-05
Payer: MEDICARE

## 2018-12-05 PROCEDURE — 97140 MANUAL THERAPY 1/> REGIONS: CPT

## 2018-12-05 PROCEDURE — 97530 THERAPEUTIC ACTIVITIES: CPT

## 2018-12-05 PROCEDURE — 97110 THERAPEUTIC EXERCISES: CPT

## 2018-12-07 ENCOUNTER — HOSPITAL ENCOUNTER (OUTPATIENT)
Dept: OCCUPATIONAL THERAPY | Age: 83
Setting detail: THERAPIES SERIES
Discharge: HOME | End: 2018-12-07
Payer: MEDICARE

## 2018-12-07 PROCEDURE — 97140 MANUAL THERAPY 1/> REGIONS: CPT

## 2018-12-07 NOTE — FLOWSHEET NOTE
[facility]  Lymphedema Services  Daily Progress Note     Date: 18  Patients Name: Barb Tanner  :34  Gender: female   IPA:[id number]  [employer] supplement-BMN  Referring Mary Ibarra MD  Time: 1:05-2:10  Visit number:32    Treatment Charges:   Minutes       Units   Evaluation (34576)            Low            Moderate            High     Manual Therapy (51509): pre,intra or post hands-on MLD service to modulate pain, increase ROM, reduce swelling, inflammation, or restriction; improve contractile/noncontractile tissue extensibility 65 4   Therapeutic activities (86358): dynamic activities to improve functional performance with or w/o bandages with direct pt contact using multiple perameters e.g. balance, strength, ROM & multiple outcomes     Therapeutic procedure (19749)-exercise for strength,endurance, ROM, flexibility (active, active-assist or passive)     Self care/home mgmt (81881)-pt educ re: self MLD for home program, self bandaging, do's/don'ts, activity guidelines     Orthotics management/training (30493): Orthotic mgmt &  training (including assessment, modification of garment, skin care instruction, fitting NOS, wearing time) UEs, LEs and/or trunk     Other:      Total Treatment Time    65 4     Daily Charge: $    90.17  Previous Total: 2939.57  Current Total:$ 3029.74    KX MODIFIER     Patient is a _80_year old []male [x]female referred to the Lymphedema Clinic with a diagnosis of RUE and UOQ of R chest wall Lymphedema d/t Advanced Breast Cancer at the request of Dr Ayush Lowery.  Pt diagnosed with R breast CA in  with a mild progression of the metastatic disease around the R breast since that time.  Pt has been treated exclusively through homeopathic and natural means which is contrary to recommendations by typical allopathic medicine.  Pt has advanced intraductal carcinoma with progression that Fingers are measured at base. CM Right Left   20 36     16 28.5     12 30     8 30.5     4 29     Olecranon 26     20 26     16 23.5     12 19.5     8 17.5     4 16.5     Wrist 16     Dorsum 20.5     SF 5.5     RF 5.5     MF 6.5     IF 6.5     TH 7     Total 343. 5        *Trunk measurements:  91.5 cm upper chest (above breasts), 93 cm lower chest ( below breasts).   Assessment  Knowledge of home program:             [] Good [x] Fair                 [] Poor  Family can assist with programming: [] Yes                 [x] No  Other:   Instructions for patient:                       [x] Verbal [x] Demonstration           [] Written  Instructions addressed:  Pt has received the TiVUS basic pump and continues to be unhappy with the results. Without the chest, neck and abdominal compression pieces associated with the advanced pump, stagnant fluids are being pushed into the shoulder, upper arm and chest causing greater pressure and anxiety for the patient.  Patient again verbalized being \"scared\" now to have fluid possibly back up into already congested areas, which is increasing stiffness to the neck, chest and upper arm.       Writer and Patient in agreement that the advanced compression pump will be helpful address swelling to the arm, chest/trunk piece, neck and abdomin and will be positive therapeutic adjunct to assist in reducing pocketing, congestion and stagnation of lymphatic fluids. Redness continues to R axilla, posterior and anterior RUQ along with increased redness and fibrotic tissue in abdomen area with lymph statis in breast including nipple. R UE assessed, measured and cleared using MLD.  An decrease in swelling noted -9.5 cm since last visit. However, patient continues to c/o proximal arm and chest feeling, \"The tightest it has ever felt with 20/10 pain due to the tightness. \"  Significant dense, stagnant congestion noted to R axilla and shoulder region superior to sleeve and compression pump.   Fluid

## 2018-12-10 ENCOUNTER — HOSPITAL ENCOUNTER (OUTPATIENT)
Dept: OCCUPATIONAL THERAPY | Age: 83
Setting detail: THERAPIES SERIES
Discharge: HOME | End: 2018-12-10
Payer: MEDICARE

## 2018-12-10 PROCEDURE — 97140 MANUAL THERAPY 1/> REGIONS: CPT

## 2018-12-12 ENCOUNTER — HOSPITAL ENCOUNTER (OUTPATIENT)
Dept: OCCUPATIONAL THERAPY | Age: 83
Setting detail: THERAPIES SERIES
Discharge: HOME | End: 2018-12-12
Payer: MEDICARE

## 2018-12-12 PROCEDURE — 97530 THERAPEUTIC ACTIVITIES: CPT

## 2018-12-12 PROCEDURE — 97140 MANUAL THERAPY 1/> REGIONS: CPT

## 2018-12-12 NOTE — PROGRESS NOTES
Fingers are measured at base. CM Right Left   20 34     16 28.5     12 28.5     8 30.5     4 29     Olecranon 24     20 24.5     16 25     12 24     8 20.5     4 17.5     Wrist 15.5     Dorsum 20     SF 5     RF 5.5     MF 6     IF 6.5     TH 6.5     Total 357        *Trunk measurements:  93 cm upper chest (above breasts), 95 cm lower chest ( below breasts).   Assessment  Knowledge of home program:             [] Good [x] Fair                 [] Poor  Family can assist with programming: [] Yes                 [x] No  Other:   Instructions for patient:                       [x] Verbal [x] Demonstration           [] Written  Instructions addressed:  Pt has received the [employer] basic pump and continues to be unhappy with the results. Without the chest, neck and abdominal compression pieces associated with the advanced pump, stagnant fluids are being pushed into the shoulder, upper arm and chest causing greater pressure and anxiety for the patient.  Patient again verbalized being \"scared\" now to have fluid possibly back up into already congested areas, which is increasing stiffness to the neck, chest and upper arm.       Writer and Patient in agreement that the advanced compression pump will be helpful address swelling to the arm, chest/trunk piece, neck and abdomin and will be positive therapeutic adjunct to assist in reducing pocketing, congestion and stagnation of lymphatic fluids. Redness continues to R axilla, posterior and anterior RUQ along with increased redness and fibrotic tissue in abdomen area with lymph statis in breast including nipple.     R UE assessed, measured and cleared using MLD.  An increase in swelling noted 14cm since last visit. Patient's neck, abdomen and posterior axilla area's are all mildly less dense with lymphatic fluid. Fluid re-routed through use of MLD to inguinal anastamosis and axillary to axillary anastomosis on anterior and posterior aspect's.  Writer performed extensive MLD to

## 2018-12-13 NOTE — FLOWSHEET NOTE
involves enlargement of lymph nodes in the supraclavicular fossa and axillary region.   Pt lives with supportive spouse, uses cane for stability during ambulation.  Pt is independent with ADLs.           ICD 10 Code:  [x] I89.0   Secondary lymphedema, not otherwise classified  [] Q82.0  Primary lymphedema (congenital) including lymphedema tarda (> 34yo)  [] I97.2   Secondary lymphedema d/t postmastectomy  [] I87.2/L97.929(L)  CVI with 6 mo non-healing venous stasis ulcer  [] I87.2/L97.919(R) CVI with 6 mo non-healing venous stasis ulcer  [] E88.2 lipomatosis NOS   [x] C50.911 Malignant neoplasm of R breast     Restrictions/Precautions:   [x] No blood pressure/blood draw in: [] Left Upper Extremity [x] Right Upper Extremity      [x] Fall Risk       [x] No               [] Yes                 Intervention: uses cane for support d/t painful arthritic R hip joint  []Other:     Pain:  [] No    [x] Yes  Location:R breast, axilla region, scapula, supraclavicular fossa of neck.  R hip, knee pain with ambulation  Pain Rating (0-10 pain scale):10/10  Pain Description:  [x] Achy,worse at end of day  [] distention, discomfort  [x] sensitive with palpation  [] guarding, tingling, numbness      [x] Hypersensitivity    [] Radiation fibrosis  Interruption of Treatment [] Yes  [x] No     Medications:  [x]See charted information     Past Medical History:  [x] See charted information  Polyarthritis, systemic lupus erythematosus, degenerative disc disease and scoliosis, major depressive disorder     Conservative Treatments Utilized in the Past:  none prior to initial evaluation  [] Compression Garments         [] Bandaging      [] Elevation         [] Exercise      []Self MLD  Frequency/Duration:     Lymphedema Contraindication Assessment: none noted  [] CHF                            []DVT     []Kidney Problems  []Cellulitis (warmth, tenderness, less defined erythematous borders)   [] Erysipelas (fiery/red plague, raised defined

## 2018-12-14 ENCOUNTER — HOSPITAL ENCOUNTER (OUTPATIENT)
Dept: OCCUPATIONAL THERAPY | Age: 83
Setting detail: THERAPIES SERIES
Discharge: HOME | End: 2018-12-14
Payer: MEDICARE

## 2018-12-14 PROCEDURE — 97140 MANUAL THERAPY 1/> REGIONS: CPT

## 2018-12-14 PROCEDURE — 97110 THERAPEUTIC EXERCISES: CPT

## 2018-12-14 NOTE — PROGRESS NOTES
[facility]  L[facility]hedema Services  Daily Progress Note     Date: 18  Patients Name: Barb Tanner  :34  Gender: female   GCS:[id number]  [place] [employer] supplement-BM[employer]  Referring Lobo Keys MD  Time: 2:00-3:00  Visit number:35     Treatment Charges:   Minutes       Units   Evaluation (15339)              Low              Moderate              High       Manual Therapy (64143): pre,intra or post hands-on MLD service to modulate pain, increase ROM, reduce swelling, inflammation, or restriction; improve contractile/noncontractile tissue extensibility 45 3   Therapeutic activities (38949): dynamic activities to improve functional performance with or w/o bandages with direct pt contact using multiple perameters e.g. balance, strength, ROM & multiple outcomes     Therapeutic procedure (80967)-exercise for strength,endurance, ROM, flexibility (active, active-assist or passive) 15   1   Self care/home mgmt (91676)-pt educ re: self MLD for home program, self bandaging, do's/don'ts, activity guidelines       Orthotics management/training (59432):  Orthotic mgmt &  training (including assessment, modification of garment, skin care instruction, fitting NOS, wearing time) UEs, LEs and/or trunk       Other:       Total Treatment Time    60 4      Daily Charge: $    92.85  Previous Total: 3216.31  Current Total:$ 3309.16   KX MODIFIER      Patient is a _80_year old []male [x]female referred to the Lymphedema Clinic with a diagnosis of RUE and UOQ of R chest wall Lymphedema d/t Advanced Breast Cancer at the request of Dr Ratna Guerra.  Pt diagnosed with R breast CA in  with a mild progression of the metastatic disease around the R breast since that time.  Pt has been treated exclusively through homeopathic and natural means which is contrary to recommendations by typical allopathic medicine.  Pt has advanced intraductal carcinoma with raised defined borders)             Clearance needed:     []Yes                  [x]No                    [] Obtained  Physician giving Clearance:     Cancer history             [x]Yes                  []No  Location:R breast  When Diagnosed: 2014  Surgery:                      []Yes                  [x]No                    Details:none, pt has declined all traditional intervention in the past  Node Dissection:         []Yes                  [x]No                    Where removed:  Chemotherapy:           []Yes                  [x]No                    []Pending           []In progress      []Completed  Radiation:                    []Yes                  [x]No                    []Pending           []In progress      []Completed  Comments: pt using homeopathic treatment only     Came to Clinic  [] Bandaged                                        []Unbandaged                        [] With Stocking                                  [] With Sleeve              [] Unna Boot                                       [x]With alternative compression: Jaqueline Lite RUE compression sleeve with silicone band  .   [] Kinesiotaped          [] R. Hand                         [] Left Hand   [] R. Arm                          [] Left Arm   [] R. Leg                          [] Left Leg                         [] R.  Breast                     [] L Breast                        []Right Upper Back         [] L Upper Back      [] Other:     Skin Integrity/Appearance- location: R axilla, R UE, chest wall, R lateral neck, lateral scapular region, abdomin  [] Normal           [x] Dry                  [x]Moist/weeping/blisters at axilla     [x] Warmth/mild/moderate inflamation  [x] Brawny/hardened       [] [place] hump-dorsum        [] Rash        [x] Reddness  [x] Crusted/bumpy       [x] Firm edema     [] Loose, lobular     []  Soft, doughy  Other Symptoms:  [] Hyperkeratosis           [] Hyperplasia             [] Hyperpigmentation   [x] Skin Care               [] Hygiene          [x]  wearing compression sleeve to RUE  Other:      Education Included:  [x] General Lymphedema Information                [x] Dos and Donts          [x] Self Massage  []Home Exercises          [] Self Bandaging          [] Kinesiotaping             []  Hygiene  Learner: [x] Patient         [] Spouse                       []Other               [] Family  Method: [x] Verbal          [x] Demonstration           [] Handouts         [x] Exercise-neck mobility  Response:       [x] Verbalized understanding      [x] Demonstrated understanding                            [] Needs assist              [] No understanding     Physical Status:  Range of motion: Deficits [x] Yes [] No                            SVWFPEH: limited shoulder AROM d/t arthritis  Strength:              Deficits [] Yes [x] No                            DNVGSON:  Sensation:            Deficits [] Yes [x] No                            TVDRDJH:  Transfer:               Deficits [] Yes [x] No                            Comment:  Ambulation:          Deficits [x] Yes [] No                            Comment: uses cane for support, R posterior hip painful  Functional Status:  Self Care:                     [x] Independent  [] Needs Assist  [] Dependent   Home Maintenance:    [] Independent  [x] Needs Assist  [] Dependent  Comments:  Fatigue:                        [] None identified          [x] Minimum        [] Moderate        [] Significant  Comments:  Psychosocial Status:  [x] Confident, independent          [] limited ADLs   [] stress, isolation, fear of future          [] sense of loss of control over life  AAO x 3:                     [x] Yes [] No                       Comment: memory loss, looks to daughter to answer questions, requires frequent repetition of instructions  Domestic Concern: none noted, supportive daughters assists as needed  Suggested Professional Referral:     Measurements Upper

## 2018-12-19 ENCOUNTER — HOSPITAL ENCOUNTER (OUTPATIENT)
Dept: OCCUPATIONAL THERAPY | Age: 83
Setting detail: THERAPIES SERIES
Discharge: HOME | End: 2018-12-19
Payer: MEDICARE

## 2018-12-19 PROCEDURE — 97530 THERAPEUTIC ACTIVITIES: CPT

## 2018-12-19 PROCEDURE — 97140 MANUAL THERAPY 1/> REGIONS: CPT

## 2018-12-19 NOTE — PROGRESS NOTES
Bandaging  [] Exercise                     [] Wound Care               [] Hygiene          [x]  wearing compression sleeve to RUE  Other:      Education Included:  [x] General Lymphedema Information                [x] Dos and Donts          [x] Self Massage  [x]Home Exercises -arm was hurting too much         [] Self Bandaging          [] Kinesiotaping             []  Hygiene  Learner: [x] Patient         [] Spouse                       []Other               [] Family  Method: [x] Verbal          [x] Demonstration           [] Handouts         [x] Exercise-neck mobility better  Response:       [x] Verbalized understanding      [x] Demonstrated understanding                            [] Needs assist              [] No understanding     Physical Status:  Range of motion: Deficits [x] Yes [] No                            RWTYDPH: limited shoulder AROM d/t arthritis  Strength:              Deficits [] Yes [x] No                            WHHUWPP:  Sensation:            Deficits [] Yes [x] No                            EZUTENF:  Transfer:               Deficits [] Yes [x] No                            Comment:  Ambulation:          Deficits [x] Yes [] No                            Comment: uses cane for support, R posterior hip painful  Functional Status:  Self Care:                     [x] Independent  [] Needs Assist  [] Dependent   Home Maintenance:    [] Independent  [x] Needs Assist  [] Dependent  Comments:  Fatigue:                        [] None identified          [x] Minimum        [] Moderate        [] Significant  Comments:  Psychosocial Status:  [x] Confident, independent          [] limited ADLs   [] stress, isolation, fear of future          [] sense of loss of control over life  AAO x 3:                     [x] Yes [] No                       Comment: memory loss, looks to daughter to answer questions, requires frequent repetition of instructions  Domestic Concern: none noted, supportive daughters

## 2018-12-20 ENCOUNTER — HOSPITAL ENCOUNTER (OUTPATIENT)
Dept: OCCUPATIONAL THERAPY | Age: 83
Setting detail: THERAPIES SERIES
Discharge: HOME | End: 2018-12-20
Payer: MEDICARE

## 2018-12-20 PROCEDURE — 97140 MANUAL THERAPY 1/> REGIONS: CPT

## 2018-12-20 NOTE — FLOWSHEET NOTE
raised defined borders)             Clearance needed:     []Yes                  [x]No                    [] Obtained  Physician giving Clearance:     Cancer history             [x]Yes                  []No  Location:R breast  When Diagnosed: 2014  Surgery:                      []Yes                  [x]No                    Details:none, pt has declined all traditional intervention in the past  Node Dissection:         []Yes                  [x]No                    Where removed:  Chemotherapy:           []Yes                  [x]No                    []Pending           []In progress      []Completed  Radiation:                    []Yes                  [x]No                    []Pending           []In progress      []Completed  Comments: pt using homeopathic treatment only     Came to Clinic  [] Bandaged                                        []Unbandaged                        [] With Stocking                                  [] With Sleeve              [] Unna Boot                                       [x]With alternative compression: Jaqueline Lite RUE compression sleeve with silicone band  .   [] Kinesiotaped          [] R. Hand                         [] Left Hand   [] R. Arm                          [] Left Arm   [] R. Leg                          [] Left Leg                         [] R.  Breast                     [] L Breast                        []Right Upper Back         [] L Upper Back      [] Other:     Skin Integrity/Appearance- location: R axilla, R UE, chest wall, R lateral neck, lateral scapular region, abdomin  [] Normal           [x] Dry                  [x]Moist/weeping/blisters at axilla     [x] Warmth/mild/moderate inflamation  [x] Brawny/hardened       [] [place] hump-dorsum        [] Rash        [x] Reddness  [x] Crusted/bumpy       [x] Firm edema     [] Loose, lobular     []  Soft, doughy  Other Symptoms:  [] Hyperkeratosis           [] Hyperplasia             [] Hyperpigmentation   [x] Skin no further changes in numerical status or changes in tissue integrity.) LTG: _12_ weeks  [] 5). Pt will increase his/her level of function as demonstrated by increased ability to _____________.  LTG: ___ weeks  [] 6). _________ STG: __ weeks   LTG: __ weeks     Plan: 12 additional weeks added to goals from 11/21/18-2/21/18  Pt will be seen 1-3 times   Continue to address:     []Bandaging  [] Self Bandaging/Family Assist  [x]MLD  [x]Self Massage  []Exercise  [x]Education  [x]Obtain lymphatic compression pump  []Medical Hold  []Discharge to Home Program     G-Codes:    [x] Eval _8/31/18___ [x] 10th visit 10/10/18      [x] 20th visit 11/14/18      [x] 30th visit_12/10/18___  [] D/C ____      Functional Limitation: EVAL 34/72, 47%, 10th visit 41/72 56%, 20th visit 54/72, 30th visit 55/72, 76%  Functional Assessment Used: Lymphedema Life Impact Scale (LLIS)  Current Status Modifier: CL  Goal Status Modifier: CJ  Discharge Status Modifier:     Physican signature is required for :  [] Plan of Care   [x]Medicare Requirement         Ashu SWEENEY, OTR/L, CLT

## 2019-01-10 ENCOUNTER — HOSPITAL ENCOUNTER (OUTPATIENT)
Dept: OCCUPATIONAL THERAPY | Age: 84
Setting detail: THERAPIES SERIES
Discharge: HOME | End: 2019-01-10
Payer: MEDICARE

## 2019-01-21 ENCOUNTER — OFFICE VISIT (OUTPATIENT)
Dept: ORTHOPEDIC SURGERY | Age: 84
End: 2019-01-21
Payer: MEDICARE

## 2019-01-21 VITALS
BODY MASS INDEX: 21.15 KG/M2 | HEIGHT: 64 IN | SYSTOLIC BLOOD PRESSURE: 155 MMHG | WEIGHT: 123.9 LBS | HEART RATE: 70 BPM | DIASTOLIC BLOOD PRESSURE: 81 MMHG

## 2019-01-21 DIAGNOSIS — S72.001A CLOSED FRACTURE OF NECK OF RIGHT FEMUR, INITIAL ENCOUNTER (HCC): Primary | ICD-10-CM

## 2019-01-21 PROCEDURE — 1101F PT FALLS ASSESS-DOCD LE1/YR: CPT | Performed by: ORTHOPAEDIC SURGERY

## 2019-01-21 PROCEDURE — 1090F PRES/ABSN URINE INCON ASSESS: CPT | Performed by: ORTHOPAEDIC SURGERY

## 2019-01-21 PROCEDURE — 99203 OFFICE O/P NEW LOW 30 MIN: CPT | Performed by: ORTHOPAEDIC SURGERY

## 2019-01-21 PROCEDURE — G8420 CALC BMI NORM PARAMETERS: HCPCS | Performed by: ORTHOPAEDIC SURGERY

## 2019-01-21 PROCEDURE — G8400 PT W/DXA NO RESULTS DOC: HCPCS | Performed by: ORTHOPAEDIC SURGERY

## 2019-01-21 PROCEDURE — G8484 FLU IMMUNIZE NO ADMIN: HCPCS | Performed by: ORTHOPAEDIC SURGERY

## 2019-01-21 PROCEDURE — G8427 DOCREV CUR MEDS BY ELIG CLIN: HCPCS | Performed by: ORTHOPAEDIC SURGERY

## 2019-01-21 PROCEDURE — 4040F PNEUMOC VAC/ADMIN/RCVD: CPT | Performed by: ORTHOPAEDIC SURGERY

## 2019-01-21 PROCEDURE — 1036F TOBACCO NON-USER: CPT | Performed by: ORTHOPAEDIC SURGERY

## 2019-01-21 PROCEDURE — 1123F ACP DISCUSS/DSCN MKR DOCD: CPT | Performed by: ORTHOPAEDIC SURGERY

## 2019-01-21 RX ORDER — OXYCODONE HYDROCHLORIDE AND ACETAMINOPHEN 5; 325 MG/1; MG/1
1-2 TABLET ORAL
Status: ON HOLD | COMMUNITY
Start: 2019-01-14 | End: 2019-01-23 | Stop reason: HOSPADM

## 2019-01-21 RX ORDER — ALPRAZOLAM 0.25 MG/1
TABLET ORAL
Refills: 5 | Status: ON HOLD | COMMUNITY
Start: 2018-12-18 | End: 2019-09-05

## 2019-01-21 RX ORDER — EXEMESTANE 25 MG/1
25 TABLET ORAL DAILY
Refills: 5 | Status: ON HOLD | COMMUNITY
Start: 2019-01-09 | End: 2019-09-05

## 2019-01-22 ENCOUNTER — HOSPITAL ENCOUNTER (INPATIENT)
Age: 84
LOS: 2 days | Discharge: NURSING, REHAB OR LONG-TERM CARE | DRG: 481 | End: 2019-01-24
Attending: ORTHOPAEDIC SURGERY | Admitting: ORTHOPAEDIC SURGERY
Payer: MEDICARE

## 2019-01-22 ENCOUNTER — ANESTHESIA EVENT (OUTPATIENT)
Dept: OPERATING ROOM | Age: 84
DRG: 481 | End: 2019-01-22
Payer: MEDICARE

## 2019-01-22 ENCOUNTER — APPOINTMENT (OUTPATIENT)
Dept: GENERAL RADIOLOGY | Age: 84
DRG: 481 | End: 2019-01-22
Attending: ORTHOPAEDIC SURGERY
Payer: MEDICARE

## 2019-01-22 ENCOUNTER — ANESTHESIA (OUTPATIENT)
Dept: OPERATING ROOM | Age: 84
DRG: 481 | End: 2019-01-22
Payer: MEDICARE

## 2019-01-22 VITALS — TEMPERATURE: 97 F | DIASTOLIC BLOOD PRESSURE: 113 MMHG | SYSTOLIC BLOOD PRESSURE: 165 MMHG | OXYGEN SATURATION: 92 %

## 2019-01-22 DIAGNOSIS — M84.451A PATHOLOGIC FRACTURE OF FEMORAL NECK, RIGHT, INITIAL ENCOUNTER (HCC): ICD-10-CM

## 2019-01-22 DIAGNOSIS — G89.18 POST-OP PAIN: Primary | ICD-10-CM

## 2019-01-22 LAB
ABSOLUTE EOS #: 0 K/UL (ref 0–0.4)
ABSOLUTE IMMATURE GRANULOCYTE: 0 K/UL (ref 0–0.3)
ABSOLUTE LYMPH #: 0.42 K/UL (ref 1–4.8)
ABSOLUTE MONO #: 0.05 K/UL (ref 0.1–0.8)
ANION GAP SERPL CALCULATED.3IONS-SCNC: 15 MMOL/L (ref 9–17)
BASOPHILS # BLD: 0 % (ref 0–2)
BASOPHILS ABSOLUTE: 0 K/UL (ref 0–0.2)
BUN BLDV-MCNC: 18 MG/DL (ref 8–23)
BUN/CREAT BLD: ABNORMAL (ref 9–20)
CALCIUM SERPL-MCNC: 8.8 MG/DL (ref 8.6–10.4)
CHLORIDE BLD-SCNC: 100 MMOL/L (ref 98–107)
CO2: 23 MMOL/L (ref 20–31)
CREAT SERPL-MCNC: 0.86 MG/DL (ref 0.5–0.9)
DIFFERENTIAL TYPE: ABNORMAL
EKG ATRIAL RATE: 61 BPM
EKG P AXIS: 90 DEGREES
EKG P-R INTERVAL: 142 MS
EKG Q-T INTERVAL: 458 MS
EKG QRS DURATION: 92 MS
EKG QTC CALCULATION (BAZETT): 461 MS
EKG R AXIS: 0 DEGREES
EKG T AXIS: 1 DEGREES
EKG VENTRICULAR RATE: 61 BPM
EOSINOPHILS RELATIVE PERCENT: 0 % (ref 1–4)
FOLATE: >20 NG/ML
GFR AFRICAN AMERICAN: >60 ML/MIN
GFR NON-AFRICAN AMERICAN: >60 ML/MIN
GFR SERPL CREATININE-BSD FRML MDRD: ABNORMAL ML/MIN/{1.73_M2}
GFR SERPL CREATININE-BSD FRML MDRD: ABNORMAL ML/MIN/{1.73_M2}
GLUCOSE BLD-MCNC: 155 MG/DL (ref 70–99)
HCT VFR BLD CALC: 35.5 % (ref 36.3–47.1)
HEMOGLOBIN: 11.9 G/DL (ref 11.9–15.1)
IMMATURE GRANULOCYTES: 0 %
LYMPHOCYTES # BLD: 8 % (ref 24–44)
MAGNESIUM: 1.9 MG/DL (ref 1.6–2.6)
MCH RBC QN AUTO: 35.8 PG (ref 25.2–33.5)
MCHC RBC AUTO-ENTMCNC: 33.5 G/DL (ref 28.4–34.8)
MCV RBC AUTO: 106.9 FL (ref 82.6–102.9)
MONOCYTES # BLD: 1 % (ref 1–7)
MORPHOLOGY: ABNORMAL
NRBC AUTOMATED: 0 PER 100 WBC
PDW BLD-RTO: 14.8 % (ref 11.8–14.4)
PLATELET # BLD: 217 K/UL (ref 138–453)
PLATELET ESTIMATE: ABNORMAL
PMV BLD AUTO: 8.7 FL (ref 8.1–13.5)
POTASSIUM SERPL-SCNC: 3.4 MMOL/L (ref 3.7–5.3)
RBC # BLD: 3.32 M/UL (ref 3.95–5.11)
RBC # BLD: ABNORMAL 10*6/UL
SEG NEUTROPHILS: 91 % (ref 36–66)
SEGMENTED NEUTROPHILS ABSOLUTE COUNT: 4.73 K/UL (ref 1.8–7.7)
SODIUM BLD-SCNC: 138 MMOL/L (ref 135–144)
TSH SERPL DL<=0.05 MIU/L-ACNC: 2.84 MIU/L (ref 0.3–5)
VITAMIN B-12: >2000 PG/ML (ref 232–1245)
WBC # BLD: 5.2 K/UL (ref 3.5–11.3)
WBC # BLD: ABNORMAL 10*3/UL

## 2019-01-22 PROCEDURE — C1713 ANCHOR/SCREW BN/BN,TIS/BN: HCPCS | Performed by: ORTHOPAEDIC SURGERY

## 2019-01-22 PROCEDURE — 82607 VITAMIN B-12: CPT

## 2019-01-22 PROCEDURE — 85025 COMPLETE CBC W/AUTO DIFF WBC: CPT

## 2019-01-22 PROCEDURE — 99221 1ST HOSP IP/OBS SF/LOW 40: CPT | Performed by: INTERNAL MEDICINE

## 2019-01-22 PROCEDURE — 36415 COLL VENOUS BLD VENIPUNCTURE: CPT

## 2019-01-22 PROCEDURE — 88311 DECALCIFY TISSUE: CPT

## 2019-01-22 PROCEDURE — 83036 HEMOGLOBIN GLYCOSYLATED A1C: CPT

## 2019-01-22 PROCEDURE — 6370000000 HC RX 637 (ALT 250 FOR IP): Performed by: STUDENT IN AN ORGANIZED HEALTH CARE EDUCATION/TRAINING PROGRAM

## 2019-01-22 PROCEDURE — 2580000003 HC RX 258: Performed by: STUDENT IN AN ORGANIZED HEALTH CARE EDUCATION/TRAINING PROGRAM

## 2019-01-22 PROCEDURE — 3600000004 HC SURGERY LEVEL 4 BASE: Performed by: ORTHOPAEDIC SURGERY

## 2019-01-22 PROCEDURE — 88342 IMHCHEM/IMCYTCHM 1ST ANTB: CPT

## 2019-01-22 PROCEDURE — 88360 TUMOR IMMUNOHISTOCHEM/MANUAL: CPT

## 2019-01-22 PROCEDURE — 2709999900 HC NON-CHARGEABLE SUPPLY: Performed by: ORTHOPAEDIC SURGERY

## 2019-01-22 PROCEDURE — 2500000003 HC RX 250 WO HCPCS: Performed by: NURSE ANESTHETIST, CERTIFIED REGISTERED

## 2019-01-22 PROCEDURE — 2720000010 HC SURG SUPPLY STERILE: Performed by: ORTHOPAEDIC SURGERY

## 2019-01-22 PROCEDURE — 73552 X-RAY EXAM OF FEMUR 2/>: CPT

## 2019-01-22 PROCEDURE — 6360000002 HC RX W HCPCS

## 2019-01-22 PROCEDURE — 3700000001 HC ADD 15 MINUTES (ANESTHESIA): Performed by: ORTHOPAEDIC SURGERY

## 2019-01-22 PROCEDURE — 6360000002 HC RX W HCPCS: Performed by: ANESTHESIOLOGY

## 2019-01-22 PROCEDURE — 1200000000 HC SEMI PRIVATE

## 2019-01-22 PROCEDURE — 0QS604Z REPOSITION RIGHT UPPER FEMUR WITH INTERNAL FIXATION DEVICE, OPEN APPROACH: ICD-10-PCS | Performed by: ORTHOPAEDIC SURGERY

## 2019-01-22 PROCEDURE — 7100000001 HC PACU RECOVERY - ADDTL 15 MIN: Performed by: ORTHOPAEDIC SURGERY

## 2019-01-22 PROCEDURE — 6360000002 HC RX W HCPCS: Performed by: NURSE ANESTHETIST, CERTIFIED REGISTERED

## 2019-01-22 PROCEDURE — 3700000000 HC ANESTHESIA ATTENDED CARE: Performed by: ORTHOPAEDIC SURGERY

## 2019-01-22 PROCEDURE — 82746 ASSAY OF FOLIC ACID SERUM: CPT

## 2019-01-22 PROCEDURE — 27245 TREAT THIGH FRACTURE: CPT | Performed by: ORTHOPAEDIC SURGERY

## 2019-01-22 PROCEDURE — 88307 TISSUE EXAM BY PATHOLOGIST: CPT

## 2019-01-22 PROCEDURE — 2580000003 HC RX 258: Performed by: ORTHOPAEDIC SURGERY

## 2019-01-22 PROCEDURE — 83735 ASSAY OF MAGNESIUM: CPT

## 2019-01-22 PROCEDURE — C1776 JOINT DEVICE (IMPLANTABLE): HCPCS | Performed by: ORTHOPAEDIC SURGERY

## 2019-01-22 PROCEDURE — 80048 BASIC METABOLIC PNL TOTAL CA: CPT

## 2019-01-22 PROCEDURE — 84443 ASSAY THYROID STIM HORMONE: CPT

## 2019-01-22 PROCEDURE — 6360000002 HC RX W HCPCS: Performed by: STUDENT IN AN ORGANIZED HEALTH CARE EDUCATION/TRAINING PROGRAM

## 2019-01-22 PROCEDURE — 6360000002 HC RX W HCPCS: Performed by: ORTHOPAEDIC SURGERY

## 2019-01-22 PROCEDURE — 3600000014 HC SURGERY LEVEL 4 ADDTL 15MIN: Performed by: ORTHOPAEDIC SURGERY

## 2019-01-22 PROCEDURE — 7100000000 HC PACU RECOVERY - FIRST 15 MIN: Performed by: ORTHOPAEDIC SURGERY

## 2019-01-22 PROCEDURE — 73502 X-RAY EXAM HIP UNI 2-3 VIEWS: CPT

## 2019-01-22 PROCEDURE — C1769 GUIDE WIRE: HCPCS | Performed by: ORTHOPAEDIC SURGERY

## 2019-01-22 PROCEDURE — 93005 ELECTROCARDIOGRAM TRACING: CPT

## 2019-01-22 PROCEDURE — 88341 IMHCHEM/IMCYTCHM EA ADD ANTB: CPT

## 2019-01-22 DEVICE — SCREW BNE L34MM DIA5MM NONSTERILE TIB LT GRN TI ST CANN LOK
Type: IMPLANTABLE DEVICE | Status: NON-FUNCTIONAL
Removed: 2019-09-05

## 2019-01-22 DEVICE — BLADE IM L100MM DIA1035MM PROX FEM G TI CANN HELI TFN ADV
Type: IMPLANTABLE DEVICE | Status: NON-FUNCTIONAL
Removed: 2019-09-05

## 2019-01-22 DEVICE — IMPLANTABLE DEVICE
Type: IMPLANTABLE DEVICE | Status: NON-FUNCTIONAL
Removed: 2019-09-05

## 2019-01-22 RX ORDER — ONDANSETRON 2 MG/ML
INJECTION INTRAMUSCULAR; INTRAVENOUS PRN
Status: DISCONTINUED | OUTPATIENT
Start: 2019-01-22 | End: 2019-01-22 | Stop reason: SDUPTHER

## 2019-01-22 RX ORDER — MORPHINE SULFATE 4 MG/ML
2 INJECTION, SOLUTION INTRAMUSCULAR; INTRAVENOUS
Status: DISCONTINUED | OUTPATIENT
Start: 2019-01-22 | End: 2019-01-24 | Stop reason: HOSPADM

## 2019-01-22 RX ORDER — MORPHINE SULFATE 4 MG/ML
4 INJECTION, SOLUTION INTRAMUSCULAR; INTRAVENOUS
Status: DISCONTINUED | OUTPATIENT
Start: 2019-01-22 | End: 2019-01-24 | Stop reason: HOSPADM

## 2019-01-22 RX ORDER — NEOSTIGMINE METHYLSULFATE 5 MG/5 ML
SYRINGE (ML) INTRAVENOUS PRN
Status: DISCONTINUED | OUTPATIENT
Start: 2019-01-22 | End: 2019-01-22 | Stop reason: SDUPTHER

## 2019-01-22 RX ORDER — ROCURONIUM BROMIDE 10 MG/ML
INJECTION, SOLUTION INTRAVENOUS PRN
Status: DISCONTINUED | OUTPATIENT
Start: 2019-01-22 | End: 2019-01-22 | Stop reason: SDUPTHER

## 2019-01-22 RX ORDER — OXYCODONE HYDROCHLORIDE AND ACETAMINOPHEN 5; 325 MG/1; MG/1
1 TABLET ORAL EVERY 4 HOURS PRN
Status: DISCONTINUED | OUTPATIENT
Start: 2019-01-22 | End: 2019-01-24 | Stop reason: HOSPADM

## 2019-01-22 RX ORDER — MAGNESIUM HYDROXIDE 1200 MG/15ML
LIQUID ORAL CONTINUOUS PRN
Status: COMPLETED | OUTPATIENT
Start: 2019-01-22 | End: 2019-01-22

## 2019-01-22 RX ORDER — SODIUM CHLORIDE 9 MG/ML
INJECTION, SOLUTION INTRAVENOUS CONTINUOUS
Status: DISCONTINUED | OUTPATIENT
Start: 2019-01-22 | End: 2019-01-24

## 2019-01-22 RX ORDER — EXEMESTANE 25 MG/1
25 TABLET ORAL DAILY
Status: DISCONTINUED | OUTPATIENT
Start: 2019-01-22 | End: 2019-01-24 | Stop reason: HOSPADM

## 2019-01-22 RX ORDER — ONDANSETRON 2 MG/ML
4 INJECTION INTRAMUSCULAR; INTRAVENOUS EVERY 6 HOURS PRN
Status: DISCONTINUED | OUTPATIENT
Start: 2019-01-22 | End: 2019-01-24 | Stop reason: HOSPADM

## 2019-01-22 RX ORDER — ALPRAZOLAM 0.25 MG/1
0.25 TABLET ORAL NIGHTLY PRN
Status: DISCONTINUED | OUTPATIENT
Start: 2019-01-22 | End: 2019-01-24 | Stop reason: HOSPADM

## 2019-01-22 RX ORDER — FENTANYL CITRATE 50 UG/ML
INJECTION, SOLUTION INTRAMUSCULAR; INTRAVENOUS PRN
Status: DISCONTINUED | OUTPATIENT
Start: 2019-01-22 | End: 2019-01-22 | Stop reason: SDUPTHER

## 2019-01-22 RX ORDER — OXYCODONE HYDROCHLORIDE AND ACETAMINOPHEN 5; 325 MG/1; MG/1
2 TABLET ORAL EVERY 4 HOURS PRN
Status: DISCONTINUED | OUTPATIENT
Start: 2019-01-22 | End: 2019-01-24 | Stop reason: HOSPADM

## 2019-01-22 RX ORDER — PROPOFOL 10 MG/ML
INJECTION, EMULSION INTRAVENOUS PRN
Status: DISCONTINUED | OUTPATIENT
Start: 2019-01-22 | End: 2019-01-22 | Stop reason: SDUPTHER

## 2019-01-22 RX ORDER — ACETAMINOPHEN 325 MG/1
650 TABLET ORAL EVERY 4 HOURS PRN
Status: DISCONTINUED | OUTPATIENT
Start: 2019-01-22 | End: 2019-01-24 | Stop reason: HOSPADM

## 2019-01-22 RX ORDER — SODIUM CHLORIDE, SODIUM LACTATE, POTASSIUM CHLORIDE, CALCIUM CHLORIDE 600; 310; 30; 20 MG/100ML; MG/100ML; MG/100ML; MG/100ML
INJECTION, SOLUTION INTRAVENOUS CONTINUOUS
Status: DISCONTINUED | OUTPATIENT
Start: 2019-01-22 | End: 2019-01-22

## 2019-01-22 RX ORDER — PREDNISONE 10 MG/1
10 TABLET ORAL DAILY
Status: DISCONTINUED | OUTPATIENT
Start: 2019-01-22 | End: 2019-01-23

## 2019-01-22 RX ORDER — PROPRANOLOL HYDROCHLORIDE 10 MG/1
10 TABLET ORAL DAILY
Status: DISCONTINUED | OUTPATIENT
Start: 2019-01-22 | End: 2019-01-24 | Stop reason: HOSPADM

## 2019-01-22 RX ORDER — FENTANYL CITRATE 50 UG/ML
25 INJECTION, SOLUTION INTRAMUSCULAR; INTRAVENOUS EVERY 5 MIN PRN
Status: COMPLETED | OUTPATIENT
Start: 2019-01-22 | End: 2019-01-22

## 2019-01-22 RX ORDER — GLYCOPYRROLATE 1 MG/5 ML
SYRINGE (ML) INTRAVENOUS PRN
Status: DISCONTINUED | OUTPATIENT
Start: 2019-01-22 | End: 2019-01-22 | Stop reason: SDUPTHER

## 2019-01-22 RX ORDER — SODIUM CHLORIDE 0.9 % (FLUSH) 0.9 %
10 SYRINGE (ML) INJECTION EVERY 12 HOURS SCHEDULED
Status: DISCONTINUED | OUTPATIENT
Start: 2019-01-22 | End: 2019-01-24 | Stop reason: HOSPADM

## 2019-01-22 RX ORDER — LIDOCAINE HYDROCHLORIDE 10 MG/ML
INJECTION, SOLUTION INFILTRATION; PERINEURAL PRN
Status: DISCONTINUED | OUTPATIENT
Start: 2019-01-22 | End: 2019-01-22 | Stop reason: SDUPTHER

## 2019-01-22 RX ORDER — SODIUM CHLORIDE 0.9 % (FLUSH) 0.9 %
10 SYRINGE (ML) INJECTION PRN
Status: DISCONTINUED | OUTPATIENT
Start: 2019-01-22 | End: 2019-01-24 | Stop reason: HOSPADM

## 2019-01-22 RX ADMIN — HYDROMORPHONE HYDROCHLORIDE 0.5 MG: 1 INJECTION, SOLUTION INTRAMUSCULAR; INTRAVENOUS; SUBCUTANEOUS at 10:35

## 2019-01-22 RX ADMIN — HYDROMORPHONE HYDROCHLORIDE 0.5 MG: 1 INJECTION, SOLUTION INTRAMUSCULAR; INTRAVENOUS; SUBCUTANEOUS at 10:58

## 2019-01-22 RX ADMIN — FENTANYL CITRATE 0.25 MCG: 50 INJECTION, SOLUTION INTRAMUSCULAR; INTRAVENOUS at 10:50

## 2019-01-22 RX ADMIN — FENTANYL CITRATE 0.25 MCG: 50 INJECTION, SOLUTION INTRAMUSCULAR; INTRAVENOUS at 10:44

## 2019-01-22 RX ADMIN — OXYCODONE HYDROCHLORIDE AND ACETAMINOPHEN 1 TABLET: 5; 325 TABLET ORAL at 17:02

## 2019-01-22 RX ADMIN — ONDANSETRON 4 MG: 2 INJECTION, SOLUTION INTRAMUSCULAR; INTRAVENOUS at 10:14

## 2019-01-22 RX ADMIN — OXYCODONE HYDROCHLORIDE AND ACETAMINOPHEN 1 TABLET: 5; 325 TABLET ORAL at 13:12

## 2019-01-22 RX ADMIN — FENTANYL CITRATE 50 MCG: 50 INJECTION INTRAMUSCULAR; INTRAVENOUS at 09:35

## 2019-01-22 RX ADMIN — HYDROMORPHONE HYDROCHLORIDE 0.5 MG: 1 INJECTION, SOLUTION INTRAMUSCULAR; INTRAVENOUS; SUBCUTANEOUS at 11:03

## 2019-01-22 RX ADMIN — ROCURONIUM BROMIDE 50 MG: 10 INJECTION INTRAVENOUS at 09:05

## 2019-01-22 RX ADMIN — SODIUM CHLORIDE: 9 INJECTION, SOLUTION INTRAVENOUS at 13:02

## 2019-01-22 RX ADMIN — EXEMESTANE 25 MG: 25 TABLET ORAL at 17:02

## 2019-01-22 RX ADMIN — Medication 0.4 MG: at 10:15

## 2019-01-22 RX ADMIN — PROPOFOL 100 MG: 10 INJECTION, EMULSION INTRAVENOUS at 09:05

## 2019-01-22 RX ADMIN — ENOXAPARIN SODIUM 40 MG: 40 INJECTION SUBCUTANEOUS at 13:12

## 2019-01-22 RX ADMIN — OXYCODONE HYDROCHLORIDE AND ACETAMINOPHEN 1 TABLET: 5; 325 TABLET ORAL at 21:25

## 2019-01-22 RX ADMIN — PHENYLEPHRINE HYDROCHLORIDE 100 MCG: 10 INJECTION INTRAVENOUS at 09:20

## 2019-01-22 RX ADMIN — Medication 3 MG: at 10:15

## 2019-01-22 RX ADMIN — FENTANYL CITRATE 50 MCG: 50 INJECTION INTRAMUSCULAR; INTRAVENOUS at 09:03

## 2019-01-22 RX ADMIN — Medication 2 G: at 09:15

## 2019-01-22 RX ADMIN — PROPRANOLOL HYDROCHLORIDE 10 MG: 10 TABLET ORAL at 21:25

## 2019-01-22 RX ADMIN — FENTANYL CITRATE 0.25 MCG: 50 INJECTION, SOLUTION INTRAMUSCULAR; INTRAVENOUS at 10:41

## 2019-01-22 RX ADMIN — SODIUM CHLORIDE, POTASSIUM CHLORIDE, SODIUM LACTATE AND CALCIUM CHLORIDE: 600; 310; 30; 20 INJECTION, SOLUTION INTRAVENOUS at 08:36

## 2019-01-22 RX ADMIN — LIDOCAINE HYDROCHLORIDE 25 MG: 10 INJECTION, SOLUTION INFILTRATION; PERINEURAL at 09:05

## 2019-01-22 RX ADMIN — PREDNISONE 10 MG: 10 TABLET ORAL at 13:12

## 2019-01-22 RX ADMIN — HYDROMORPHONE HYDROCHLORIDE 0.5 MG: 1 INJECTION, SOLUTION INTRAMUSCULAR; INTRAVENOUS; SUBCUTANEOUS at 10:36

## 2019-01-22 RX ADMIN — ALPRAZOLAM 0.25 MG: 0.25 TABLET ORAL at 21:25

## 2019-01-22 RX ADMIN — FENTANYL CITRATE 0.25 MCG: 50 INJECTION, SOLUTION INTRAMUSCULAR; INTRAVENOUS at 10:46

## 2019-01-22 RX ADMIN — DEXTROSE MONOHYDRATE 2 G: 50 INJECTION, SOLUTION INTRAVENOUS at 17:02

## 2019-01-22 RX ADMIN — HYDROCORTISONE SODIUM SUCCINATE 100 MG: 100 INJECTION, POWDER, FOR SOLUTION INTRAMUSCULAR; INTRAVENOUS at 09:15

## 2019-01-22 ASSESSMENT — PAIN SCALES - GENERAL
PAINLEVEL_OUTOF10: 6
PAINLEVEL_OUTOF10: 7
PAINLEVEL_OUTOF10: 8
PAINLEVEL_OUTOF10: 7
PAINLEVEL_OUTOF10: 9
PAINLEVEL_OUTOF10: 10
PAINLEVEL_OUTOF10: 4
PAINLEVEL_OUTOF10: 9
PAINLEVEL_OUTOF10: 7
PAINLEVEL_OUTOF10: 8
PAINLEVEL_OUTOF10: 8
PAINLEVEL_OUTOF10: 10
PAINLEVEL_OUTOF10: 9
PAINLEVEL_OUTOF10: 8
PAINLEVEL_OUTOF10: 6
PAINLEVEL_OUTOF10: 4
PAINLEVEL_OUTOF10: 3

## 2019-01-22 ASSESSMENT — PULMONARY FUNCTION TESTS
PIF_VALUE: 20
PIF_VALUE: 0
PIF_VALUE: 20
PIF_VALUE: 20
PIF_VALUE: 21
PIF_VALUE: 18
PIF_VALUE: 20
PIF_VALUE: 18
PIF_VALUE: 2
PIF_VALUE: 18
PIF_VALUE: 2
PIF_VALUE: 19
PIF_VALUE: 20
PIF_VALUE: 18
PIF_VALUE: 26
PIF_VALUE: 20
PIF_VALUE: 18
PIF_VALUE: 20
PIF_VALUE: 8
PIF_VALUE: 19
PIF_VALUE: 21
PIF_VALUE: 19
PIF_VALUE: 1
PIF_VALUE: 21
PIF_VALUE: 2
PIF_VALUE: 19
PIF_VALUE: 20
PIF_VALUE: 17
PIF_VALUE: 22
PIF_VALUE: 18
PIF_VALUE: 20
PIF_VALUE: 18
PIF_VALUE: 17
PIF_VALUE: 18
PIF_VALUE: 18
PIF_VALUE: 20
PIF_VALUE: 19
PIF_VALUE: 17
PIF_VALUE: 23
PIF_VALUE: 13
PIF_VALUE: 18
PIF_VALUE: 17
PIF_VALUE: 2
PIF_VALUE: 18
PIF_VALUE: 2
PIF_VALUE: 17
PIF_VALUE: 19
PIF_VALUE: 20
PIF_VALUE: 19
PIF_VALUE: 19
PIF_VALUE: 0
PIF_VALUE: 21
PIF_VALUE: 17
PIF_VALUE: 6
PIF_VALUE: 18
PIF_VALUE: 20
PIF_VALUE: 17
PIF_VALUE: 20
PIF_VALUE: 18
PIF_VALUE: 17
PIF_VALUE: 19
PIF_VALUE: 17
PIF_VALUE: 17
PIF_VALUE: 19
PIF_VALUE: 0
PIF_VALUE: 4
PIF_VALUE: 18
PIF_VALUE: 20
PIF_VALUE: 19
PIF_VALUE: 18
PIF_VALUE: 18
PIF_VALUE: 19
PIF_VALUE: 19
PIF_VALUE: 18
PIF_VALUE: 16
PIF_VALUE: 21
PIF_VALUE: 17
PIF_VALUE: 18

## 2019-01-22 ASSESSMENT — PAIN DESCRIPTION - ORIENTATION: ORIENTATION: RIGHT

## 2019-01-22 ASSESSMENT — PAIN - FUNCTIONAL ASSESSMENT: PAIN_FUNCTIONAL_ASSESSMENT: 0-10

## 2019-01-22 ASSESSMENT — PAIN DESCRIPTION - DESCRIPTORS: DESCRIPTORS: ACHING;DISCOMFORT

## 2019-01-22 ASSESSMENT — PAIN DESCRIPTION - PROGRESSION: CLINICAL_PROGRESSION: NOT CHANGED

## 2019-01-22 ASSESSMENT — PAIN DESCRIPTION - ONSET: ONSET: ON-GOING

## 2019-01-22 ASSESSMENT — PAIN DESCRIPTION - PAIN TYPE: TYPE: ACUTE PAIN;SURGICAL PAIN

## 2019-01-22 ASSESSMENT — ENCOUNTER SYMPTOMS: SHORTNESS OF BREATH: 0

## 2019-01-22 ASSESSMENT — PAIN DESCRIPTION - LOCATION: LOCATION: HIP

## 2019-01-22 ASSESSMENT — PAIN DESCRIPTION - FREQUENCY: FREQUENCY: CONTINUOUS

## 2019-01-23 LAB
ABSOLUTE EOS #: <0.03 K/UL (ref 0–0.44)
ABSOLUTE IMMATURE GRANULOCYTE: <0.03 K/UL (ref 0–0.3)
ABSOLUTE LYMPH #: 0.87 K/UL (ref 1.1–3.7)
ABSOLUTE MONO #: 0.45 K/UL (ref 0.1–1.2)
ANION GAP SERPL CALCULATED.3IONS-SCNC: 15 MMOL/L (ref 9–17)
BASOPHILS # BLD: 0 % (ref 0–2)
BASOPHILS ABSOLUTE: <0.03 K/UL (ref 0–0.2)
BUN BLDV-MCNC: 18 MG/DL (ref 8–23)
BUN/CREAT BLD: ABNORMAL (ref 9–20)
CALCIUM SERPL-MCNC: 8.3 MG/DL (ref 8.6–10.4)
CHLORIDE BLD-SCNC: 98 MMOL/L (ref 98–107)
CO2: 24 MMOL/L (ref 20–31)
CREAT SERPL-MCNC: 0.99 MG/DL (ref 0.5–0.9)
DIFFERENTIAL TYPE: ABNORMAL
EOSINOPHILS RELATIVE PERCENT: 0 % (ref 1–4)
ESTIMATED AVERAGE GLUCOSE: 94 MG/DL
GFR AFRICAN AMERICAN: >60 ML/MIN
GFR NON-AFRICAN AMERICAN: 53 ML/MIN
GFR SERPL CREATININE-BSD FRML MDRD: ABNORMAL ML/MIN/{1.73_M2}
GFR SERPL CREATININE-BSD FRML MDRD: ABNORMAL ML/MIN/{1.73_M2}
GLUCOSE BLD-MCNC: 102 MG/DL (ref 70–99)
HBA1C MFR BLD: 4.9 % (ref 4–6)
HCT VFR BLD CALC: 33.2 % (ref 36.3–47.1)
HEMOGLOBIN: 10.8 G/DL (ref 11.9–15.1)
IMMATURE GRANULOCYTES: 0 %
LYMPHOCYTES # BLD: 19 % (ref 24–43)
MCH RBC QN AUTO: 35.4 PG (ref 25.2–33.5)
MCHC RBC AUTO-ENTMCNC: 32.5 G/DL (ref 28.4–34.8)
MCV RBC AUTO: 108.9 FL (ref 82.6–102.9)
MONOCYTES # BLD: 10 % (ref 3–12)
NRBC AUTOMATED: 0 PER 100 WBC
PDW BLD-RTO: 15 % (ref 11.8–14.4)
PLATELET # BLD: 190 K/UL (ref 138–453)
PLATELET ESTIMATE: ABNORMAL
PMV BLD AUTO: 9.1 FL (ref 8.1–13.5)
POTASSIUM SERPL-SCNC: 3.7 MMOL/L (ref 3.7–5.3)
RBC # BLD: 3.05 M/UL (ref 3.95–5.11)
RBC # BLD: ABNORMAL 10*6/UL
SEG NEUTROPHILS: 71 % (ref 36–65)
SEGMENTED NEUTROPHILS ABSOLUTE COUNT: 3.25 K/UL (ref 1.5–8.1)
SODIUM BLD-SCNC: 137 MMOL/L (ref 135–144)
SURGICAL PATHOLOGY REPORT: NORMAL
WBC # BLD: 4.6 K/UL (ref 3.5–11.3)
WBC # BLD: ABNORMAL 10*3/UL

## 2019-01-23 PROCEDURE — 97530 THERAPEUTIC ACTIVITIES: CPT

## 2019-01-23 PROCEDURE — 97110 THERAPEUTIC EXERCISES: CPT

## 2019-01-23 PROCEDURE — 6360000002 HC RX W HCPCS: Performed by: STUDENT IN AN ORGANIZED HEALTH CARE EDUCATION/TRAINING PROGRAM

## 2019-01-23 PROCEDURE — 6370000000 HC RX 637 (ALT 250 FOR IP): Performed by: STUDENT IN AN ORGANIZED HEALTH CARE EDUCATION/TRAINING PROGRAM

## 2019-01-23 PROCEDURE — 97535 SELF CARE MNGMENT TRAINING: CPT

## 2019-01-23 PROCEDURE — 36415 COLL VENOUS BLD VENIPUNCTURE: CPT

## 2019-01-23 PROCEDURE — 85025 COMPLETE CBC W/AUTO DIFF WBC: CPT

## 2019-01-23 PROCEDURE — 97116 GAIT TRAINING THERAPY: CPT

## 2019-01-23 PROCEDURE — 97166 OT EVAL MOD COMPLEX 45 MIN: CPT

## 2019-01-23 PROCEDURE — 2580000003 HC RX 258: Performed by: STUDENT IN AN ORGANIZED HEALTH CARE EDUCATION/TRAINING PROGRAM

## 2019-01-23 PROCEDURE — 80048 BASIC METABOLIC PNL TOTAL CA: CPT

## 2019-01-23 PROCEDURE — 99222 1ST HOSP IP/OBS MODERATE 55: CPT | Performed by: INTERNAL MEDICINE

## 2019-01-23 PROCEDURE — 1200000000 HC SEMI PRIVATE

## 2019-01-23 PROCEDURE — 97162 PT EVAL MOD COMPLEX 30 MIN: CPT

## 2019-01-23 RX ORDER — PREDNISONE 1 MG/1
5 TABLET ORAL DAILY
Status: DISCONTINUED | OUTPATIENT
Start: 2019-01-24 | End: 2019-01-24 | Stop reason: HOSPADM

## 2019-01-23 RX ORDER — FENTANYL CITRATE 50 UG/ML
25 INJECTION, SOLUTION INTRAMUSCULAR; INTRAVENOUS ONCE
Status: COMPLETED | OUTPATIENT
Start: 2019-01-23 | End: 2019-01-23

## 2019-01-23 RX ORDER — DOCUSATE SODIUM 100 MG/1
100 CAPSULE, LIQUID FILLED ORAL 2 TIMES DAILY PRN
Qty: 60 CAPSULE | Refills: 0 | Status: ON HOLD | OUTPATIENT
Start: 2019-01-23 | End: 2019-03-08

## 2019-01-23 RX ORDER — KETOROLAC TROMETHAMINE 15 MG/ML
15 INJECTION, SOLUTION INTRAMUSCULAR; INTRAVENOUS ONCE
Status: COMPLETED | OUTPATIENT
Start: 2019-01-23 | End: 2019-01-23

## 2019-01-23 RX ORDER — OXYCODONE HYDROCHLORIDE AND ACETAMINOPHEN 5; 325 MG/1; MG/1
1-2 TABLET ORAL EVERY 6 HOURS PRN
Qty: 28 TABLET | Refills: 0 | Status: SHIPPED | OUTPATIENT
Start: 2019-01-23 | End: 2019-01-30

## 2019-01-23 RX ADMIN — SODIUM CHLORIDE: 9 INJECTION, SOLUTION INTRAVENOUS at 02:17

## 2019-01-23 RX ADMIN — OXYCODONE HYDROCHLORIDE AND ACETAMINOPHEN 1 TABLET: 5; 325 TABLET ORAL at 10:43

## 2019-01-23 RX ADMIN — DEXTROSE MONOHYDRATE 2 G: 50 INJECTION, SOLUTION INTRAVENOUS at 00:38

## 2019-01-23 RX ADMIN — OXYCODONE HYDROCHLORIDE AND ACETAMINOPHEN 1 TABLET: 5; 325 TABLET ORAL at 15:52

## 2019-01-23 RX ADMIN — ALPRAZOLAM 0.25 MG: 0.25 TABLET ORAL at 22:55

## 2019-01-23 RX ADMIN — OXYCODONE HYDROCHLORIDE AND ACETAMINOPHEN 1 TABLET: 5; 325 TABLET ORAL at 06:11

## 2019-01-23 RX ADMIN — ENOXAPARIN SODIUM 40 MG: 40 INJECTION SUBCUTANEOUS at 09:50

## 2019-01-23 RX ADMIN — EXEMESTANE 25 MG: 25 TABLET ORAL at 17:20

## 2019-01-23 RX ADMIN — PROPRANOLOL HYDROCHLORIDE 10 MG: 10 TABLET ORAL at 20:51

## 2019-01-23 RX ADMIN — PREDNISONE 10 MG: 10 TABLET ORAL at 09:49

## 2019-01-23 RX ADMIN — OXYCODONE HYDROCHLORIDE AND ACETAMINOPHEN 2 TABLET: 5; 325 TABLET ORAL at 02:15

## 2019-01-23 RX ADMIN — FENTANYL CITRATE 25 MCG: 50 INJECTION, SOLUTION INTRAMUSCULAR; INTRAVENOUS at 09:47

## 2019-01-23 RX ADMIN — OXYCODONE HYDROCHLORIDE AND ACETAMINOPHEN 1 TABLET: 5; 325 TABLET ORAL at 20:51

## 2019-01-23 RX ADMIN — KETOROLAC TROMETHAMINE 15 MG: 15 INJECTION INTRAMUSCULAR; INTRAVENOUS at 00:54

## 2019-01-23 ASSESSMENT — PAIN DESCRIPTION - ORIENTATION
ORIENTATION: RIGHT

## 2019-01-23 ASSESSMENT — PAIN SCALES - GENERAL
PAINLEVEL_OUTOF10: 3
PAINLEVEL_OUTOF10: 3
PAINLEVEL_OUTOF10: 5
PAINLEVEL_OUTOF10: 5
PAINLEVEL_OUTOF10: 7
PAINLEVEL_OUTOF10: 6
PAINLEVEL_OUTOF10: 10
PAINLEVEL_OUTOF10: 2
PAINLEVEL_OUTOF10: 3
PAINLEVEL_OUTOF10: 2
PAINLEVEL_OUTOF10: 10
PAINLEVEL_OUTOF10: 3
PAINLEVEL_OUTOF10: 8
PAINLEVEL_OUTOF10: 4
PAINLEVEL_OUTOF10: 5
PAINLEVEL_OUTOF10: 1
PAINLEVEL_OUTOF10: 2

## 2019-01-23 ASSESSMENT — PAIN DESCRIPTION - LOCATION
LOCATION: HIP

## 2019-01-23 ASSESSMENT — PAIN DESCRIPTION - PAIN TYPE
TYPE: ACUTE PAIN
TYPE: SURGICAL PAIN
TYPE: ACUTE PAIN
TYPE: SURGICAL PAIN

## 2019-01-23 ASSESSMENT — PAIN DESCRIPTION - ONSET: ONSET: ON-GOING

## 2019-01-23 ASSESSMENT — PAIN DESCRIPTION - FREQUENCY
FREQUENCY: INTERMITTENT

## 2019-01-23 ASSESSMENT — PAIN DESCRIPTION - DESCRIPTORS
DESCRIPTORS: SHARP

## 2019-01-24 VITALS
HEIGHT: 64 IN | OXYGEN SATURATION: 94 % | WEIGHT: 123 LBS | RESPIRATION RATE: 18 BRPM | HEART RATE: 70 BPM | TEMPERATURE: 98.4 F | DIASTOLIC BLOOD PRESSURE: 92 MMHG | BODY MASS INDEX: 21 KG/M2 | SYSTOLIC BLOOD PRESSURE: 145 MMHG

## 2019-01-24 PROCEDURE — 6360000002 HC RX W HCPCS: Performed by: STUDENT IN AN ORGANIZED HEALTH CARE EDUCATION/TRAINING PROGRAM

## 2019-01-24 PROCEDURE — 6370000000 HC RX 637 (ALT 250 FOR IP): Performed by: STUDENT IN AN ORGANIZED HEALTH CARE EDUCATION/TRAINING PROGRAM

## 2019-01-24 PROCEDURE — 97535 SELF CARE MNGMENT TRAINING: CPT

## 2019-01-24 PROCEDURE — 97530 THERAPEUTIC ACTIVITIES: CPT

## 2019-01-24 PROCEDURE — 97116 GAIT TRAINING THERAPY: CPT

## 2019-01-24 PROCEDURE — 2580000003 HC RX 258: Performed by: STUDENT IN AN ORGANIZED HEALTH CARE EDUCATION/TRAINING PROGRAM

## 2019-01-24 PROCEDURE — 97110 THERAPEUTIC EXERCISES: CPT

## 2019-01-24 RX ADMIN — PREDNISONE 5 MG: 5 TABLET ORAL at 09:00

## 2019-01-24 RX ADMIN — OXYCODONE HYDROCHLORIDE AND ACETAMINOPHEN 1 TABLET: 5; 325 TABLET ORAL at 02:02

## 2019-01-24 RX ADMIN — EXEMESTANE 25 MG: 25 TABLET ORAL at 16:17

## 2019-01-24 RX ADMIN — OXYCODONE HYDROCHLORIDE AND ACETAMINOPHEN 1 TABLET: 5; 325 TABLET ORAL at 11:24

## 2019-01-24 RX ADMIN — ENOXAPARIN SODIUM 40 MG: 40 INJECTION SUBCUTANEOUS at 09:00

## 2019-01-24 RX ADMIN — OXYCODONE HYDROCHLORIDE AND ACETAMINOPHEN 2 TABLET: 5; 325 TABLET ORAL at 15:34

## 2019-01-24 RX ADMIN — MORPHINE SULFATE 2 MG: 4 INJECTION INTRAVENOUS at 03:33

## 2019-01-24 RX ADMIN — Medication 10 ML: at 09:00

## 2019-01-24 RX ADMIN — OXYCODONE HYDROCHLORIDE AND ACETAMINOPHEN 1 TABLET: 5; 325 TABLET ORAL at 07:05

## 2019-01-24 ASSESSMENT — PAIN DESCRIPTION - LOCATION
LOCATION: HIP
LOCATION: HIP
LOCATION: LEG;HIP

## 2019-01-24 ASSESSMENT — PAIN SCALES - GENERAL
PAINLEVEL_OUTOF10: 6
PAINLEVEL_OUTOF10: 5
PAINLEVEL_OUTOF10: 5
PAINLEVEL_OUTOF10: 7
PAINLEVEL_OUTOF10: 6
PAINLEVEL_OUTOF10: 5
PAINLEVEL_OUTOF10: 6
PAINLEVEL_OUTOF10: 10
PAINLEVEL_OUTOF10: 7
PAINLEVEL_OUTOF10: 9
PAINLEVEL_OUTOF10: 5

## 2019-01-24 ASSESSMENT — PAIN DESCRIPTION - DESCRIPTORS
DESCRIPTORS: ACHING
DESCRIPTORS: SHARP;SORE
DESCRIPTORS: ACHING;DISCOMFORT;SORE

## 2019-01-24 ASSESSMENT — PAIN DESCRIPTION - FREQUENCY
FREQUENCY: CONTINUOUS

## 2019-01-24 ASSESSMENT — PAIN DESCRIPTION - PROGRESSION
CLINICAL_PROGRESSION: NOT CHANGED

## 2019-01-24 ASSESSMENT — PAIN DESCRIPTION - ORIENTATION
ORIENTATION: RIGHT

## 2019-01-24 ASSESSMENT — PAIN DESCRIPTION - ONSET: ONSET: ON-GOING

## 2019-01-24 ASSESSMENT — PAIN DESCRIPTION - PAIN TYPE
TYPE: SURGICAL PAIN;ACUTE PAIN
TYPE: SURGICAL PAIN
TYPE: SURGICAL PAIN

## 2019-02-05 ENCOUNTER — HOSPITAL ENCOUNTER (OUTPATIENT)
Dept: RADIATION ONCOLOGY | Age: 84
Discharge: HOME | End: 2019-02-05
Payer: MEDICARE

## 2019-02-05 VITALS
BODY MASS INDEX: 22.68 KG/M2 | SYSTOLIC BLOOD PRESSURE: 137 MMHG | HEIGHT: 63 IN | TEMPERATURE: 98.6 F | DIASTOLIC BLOOD PRESSURE: 69 MMHG | OXYGEN SATURATION: 92 % | HEART RATE: 72 BPM | WEIGHT: 128 LBS | RESPIRATION RATE: 16 BRPM

## 2019-02-05 DIAGNOSIS — C79.51 SECONDARY ADENOCARCINOMA OF BONE (HCC): ICD-10-CM

## 2019-02-05 DIAGNOSIS — M84.451A PATHOLOGIC FRACTURE OF FEMORAL NECK, RIGHT, INITIAL ENCOUNTER (HCC): ICD-10-CM

## 2019-02-05 DIAGNOSIS — Z17.0 MALIGNANT NEOPLASM OF RIGHT BREAST IN FEMALE, ESTROGEN RECEPTOR POSITIVE, UNSPECIFIED SITE OF BREAST (HCC): Primary | ICD-10-CM

## 2019-02-05 DIAGNOSIS — C50.911 MALIGNANT NEOPLASM OF RIGHT BREAST IN FEMALE, ESTROGEN RECEPTOR POSITIVE, UNSPECIFIED SITE OF BREAST (HCC): Primary | ICD-10-CM

## 2019-02-05 PROCEDURE — 99213 OFFICE O/P EST LOW 20 MIN: CPT | Performed by: RADIOLOGY

## 2019-02-05 RX ORDER — PROCHLORPERAZINE MALEATE 5 MG/1
5 TABLET ORAL EVERY 6 HOURS PRN
Qty: 120 TABLET | Refills: 0 | Status: SHIPPED | OUTPATIENT
Start: 2019-02-05 | End: 2019-03-08

## 2019-02-05 RX ORDER — OXYCODONE HYDROCHLORIDE AND ACETAMINOPHEN 5; 325 MG/1; MG/1
1 TABLET ORAL EVERY 4 HOURS PRN
COMMUNITY
End: 2019-03-08

## 2019-02-05 ASSESSMENT — PAIN SCALES - GENERAL: PAINLEVEL_OUTOF10: 5

## 2019-02-05 ASSESSMENT — PAIN DESCRIPTION - PAIN TYPE: TYPE: SURGICAL PAIN

## 2019-02-05 ASSESSMENT — PAIN DESCRIPTION - LOCATION: LOCATION: HIP

## 2019-02-05 ASSESSMENT — PAIN DESCRIPTION - ORIENTATION: ORIENTATION: RIGHT

## 2019-02-06 ENCOUNTER — OFFICE VISIT (OUTPATIENT)
Dept: ORTHOPEDIC SURGERY | Age: 84
End: 2019-02-06

## 2019-02-06 VITALS
WEIGHT: 128 LBS | SYSTOLIC BLOOD PRESSURE: 129 MMHG | DIASTOLIC BLOOD PRESSURE: 71 MMHG | HEIGHT: 64 IN | HEART RATE: 67 BPM | BODY MASS INDEX: 21.85 KG/M2

## 2019-02-06 DIAGNOSIS — S72.144D CLOSED NONDISPLACED INTERTROCHANTERIC FRACTURE OF RIGHT FEMUR WITH ROUTINE HEALING, SUBSEQUENT ENCOUNTER: Primary | ICD-10-CM

## 2019-02-06 PROCEDURE — 99024 POSTOP FOLLOW-UP VISIT: CPT | Performed by: ORTHOPAEDIC SURGERY

## 2019-02-14 ENCOUNTER — HOSPITAL ENCOUNTER (OUTPATIENT)
Dept: RADIATION ONCOLOGY | Age: 84
Discharge: HOME | End: 2019-02-14
Attending: RADIOLOGY
Payer: MEDICARE

## 2019-02-14 PROCEDURE — 77290 THER RAD SIMULAJ FIELD CPLX: CPT | Performed by: RADIOLOGY

## 2019-02-14 PROCEDURE — 77334 RADIATION TREATMENT AID(S): CPT | Performed by: RADIOLOGY

## 2019-02-18 ENCOUNTER — HOSPITAL ENCOUNTER (OUTPATIENT)
Dept: RADIATION ONCOLOGY | Age: 84
Discharge: HOME | End: 2019-02-18
Attending: RADIOLOGY
Payer: MEDICARE

## 2019-02-18 PROCEDURE — 77334 RADIATION TREATMENT AID(S): CPT | Performed by: RADIOLOGY

## 2019-02-18 PROCEDURE — 77307 TELETHX ISODOSE PLAN CPLX: CPT | Performed by: RADIOLOGY

## 2019-02-19 ENCOUNTER — HOSPITAL ENCOUNTER (OUTPATIENT)
Dept: RADIATION ONCOLOGY | Age: 84
End: 2019-02-19
Attending: RADIOLOGY
Payer: MEDICARE

## 2019-02-19 ENCOUNTER — HOSPITAL ENCOUNTER (OUTPATIENT)
Age: 84
Discharge: HOME | End: 2019-02-19
Payer: MEDICARE

## 2019-02-19 DIAGNOSIS — C79.51 SECONDARY ADENOCARCINOMA OF BONE (HCC): Primary | ICD-10-CM

## 2019-02-19 DIAGNOSIS — C79.51 SECONDARY ADENOCARCINOMA OF BONE (HCC): ICD-10-CM

## 2019-02-19 LAB
HCT VFR BLD CALC: 35.6 % (ref 36–46)
HEMOGLOBIN: 11.8 G/DL (ref 12–16)
MCH RBC QN AUTO: 37.1 PG (ref 26–34)
MCHC RBC AUTO-ENTMCNC: 33.1 G/DL (ref 31–37)
MCV RBC AUTO: 111.9 FL (ref 80–100)
NRBC AUTOMATED: ABNORMAL PER 100 WBC
PDW BLD-RTO: 14.8 % (ref 12.5–15.4)
PLATELET # BLD: 217 K/UL (ref 140–450)
PMV BLD AUTO: 9.3 FL (ref 8–14)
RBC # BLD: 3.18 M/UL (ref 4–5.2)
WBC # BLD: 3.1 K/UL (ref 3.5–11)

## 2019-02-19 PROCEDURE — 36415 COLL VENOUS BLD VENIPUNCTURE: CPT

## 2019-02-19 PROCEDURE — 85027 COMPLETE CBC AUTOMATED: CPT

## 2019-02-20 ENCOUNTER — APPOINTMENT (OUTPATIENT)
Dept: RADIATION ONCOLOGY | Age: 84
End: 2019-02-20
Attending: RADIOLOGY
Payer: MEDICARE

## 2019-02-21 ENCOUNTER — APPOINTMENT (OUTPATIENT)
Dept: RADIATION ONCOLOGY | Age: 84
End: 2019-02-21
Attending: RADIOLOGY
Payer: MEDICARE

## 2019-02-21 ENCOUNTER — HOSPITAL ENCOUNTER (OUTPATIENT)
Dept: RADIATION ONCOLOGY | Age: 84
Discharge: HOME | End: 2019-02-21
Attending: RADIOLOGY
Payer: MEDICARE

## 2019-02-21 PROCEDURE — 77280 THER RAD SIMULAJ FIELD SMPL: CPT | Performed by: RADIOLOGY

## 2019-02-21 PROCEDURE — 77387 GUIDANCE FOR RADJ TX DLVR: CPT | Performed by: RADIOLOGY

## 2019-02-21 PROCEDURE — 77412 RADIATION TX DELIVERY LVL 3: CPT | Performed by: RADIOLOGY

## 2019-02-22 ENCOUNTER — HOSPITAL ENCOUNTER (OUTPATIENT)
Dept: RADIATION ONCOLOGY | Age: 84
Discharge: HOME | End: 2019-02-22
Attending: RADIOLOGY
Payer: MEDICARE

## 2019-02-22 PROCEDURE — 77387 GUIDANCE FOR RADJ TX DLVR: CPT | Performed by: RADIOLOGY

## 2019-02-22 PROCEDURE — 77412 RADIATION TX DELIVERY LVL 3: CPT | Performed by: RADIOLOGY

## 2019-02-25 ENCOUNTER — HOSPITAL ENCOUNTER (OUTPATIENT)
Dept: RADIATION ONCOLOGY | Age: 84
Discharge: HOME | End: 2019-02-25
Attending: RADIOLOGY
Payer: MEDICARE

## 2019-02-25 ENCOUNTER — HOSPITAL ENCOUNTER (OUTPATIENT)
Dept: RADIATION ONCOLOGY | Age: 84
Discharge: HOME | End: 2019-02-25
Payer: MEDICARE

## 2019-02-25 VITALS
SYSTOLIC BLOOD PRESSURE: 168 MMHG | OXYGEN SATURATION: 93 % | DIASTOLIC BLOOD PRESSURE: 97 MMHG | WEIGHT: 122 LBS | BODY MASS INDEX: 20.94 KG/M2 | HEART RATE: 64 BPM | TEMPERATURE: 98 F | RESPIRATION RATE: 16 BRPM

## 2019-02-25 DIAGNOSIS — Z17.0 MALIGNANT NEOPLASM OF UPPER-OUTER QUADRANT OF RIGHT BREAST IN FEMALE, ESTROGEN RECEPTOR POSITIVE (HCC): ICD-10-CM

## 2019-02-25 DIAGNOSIS — C50.411 MALIGNANT NEOPLASM OF UPPER-OUTER QUADRANT OF RIGHT BREAST IN FEMALE, ESTROGEN RECEPTOR POSITIVE (HCC): ICD-10-CM

## 2019-02-25 DIAGNOSIS — C79.51 SECONDARY ADENOCARCINOMA OF BONE (HCC): Primary | ICD-10-CM

## 2019-02-25 PROCEDURE — 77412 RADIATION TX DELIVERY LVL 3: CPT | Performed by: RADIOLOGY

## 2019-02-25 PROCEDURE — 77387 GUIDANCE FOR RADJ TX DLVR: CPT | Performed by: RADIOLOGY

## 2019-02-25 ASSESSMENT — PAIN SCALES - GENERAL: PAINLEVEL_OUTOF10: 0

## 2019-02-26 ENCOUNTER — TELEPHONE (OUTPATIENT)
Dept: ONCOLOGY | Age: 84
End: 2019-02-26

## 2019-02-26 ENCOUNTER — HOSPITAL ENCOUNTER (OUTPATIENT)
Dept: RADIATION ONCOLOGY | Age: 84
Discharge: HOME | End: 2019-02-26
Attending: RADIOLOGY
Payer: MEDICARE

## 2019-02-26 PROCEDURE — 77387 GUIDANCE FOR RADJ TX DLVR: CPT | Performed by: RADIOLOGY

## 2019-02-26 PROCEDURE — 77412 RADIATION TX DELIVERY LVL 3: CPT | Performed by: RADIOLOGY

## 2019-02-27 ENCOUNTER — HOSPITAL ENCOUNTER (OUTPATIENT)
Dept: RADIATION ONCOLOGY | Age: 84
Discharge: HOME | End: 2019-02-27
Attending: RADIOLOGY
Payer: MEDICARE

## 2019-02-27 PROCEDURE — 77387 GUIDANCE FOR RADJ TX DLVR: CPT | Performed by: RADIOLOGY

## 2019-02-27 PROCEDURE — 77412 RADIATION TX DELIVERY LVL 3: CPT | Performed by: RADIOLOGY

## 2019-02-27 PROCEDURE — 77336 RADIATION PHYSICS CONSULT: CPT | Performed by: RADIOLOGY

## 2019-02-28 ENCOUNTER — HOSPITAL ENCOUNTER (OUTPATIENT)
Dept: RADIATION ONCOLOGY | Age: 84
Discharge: HOME | End: 2019-02-28
Attending: RADIOLOGY
Payer: MEDICARE

## 2019-02-28 ENCOUNTER — CLINICAL DOCUMENTATION (OUTPATIENT)
Dept: RADIATION ONCOLOGY | Age: 84
End: 2019-02-28

## 2019-02-28 DIAGNOSIS — C79.51 SECONDARY ADENOCARCINOMA OF BONE (HCC): ICD-10-CM

## 2019-02-28 DIAGNOSIS — C50.411 MALIGNANT NEOPLASM OF UPPER-OUTER QUADRANT OF RIGHT BREAST IN FEMALE, ESTROGEN RECEPTOR POSITIVE (HCC): Primary | ICD-10-CM

## 2019-02-28 DIAGNOSIS — Z17.0 MALIGNANT NEOPLASM OF UPPER-OUTER QUADRANT OF RIGHT BREAST IN FEMALE, ESTROGEN RECEPTOR POSITIVE (HCC): Primary | ICD-10-CM

## 2019-02-28 PROCEDURE — 77387 GUIDANCE FOR RADJ TX DLVR: CPT | Performed by: RADIOLOGY

## 2019-02-28 PROCEDURE — 77412 RADIATION TX DELIVERY LVL 3: CPT | Performed by: RADIOLOGY

## 2019-03-01 ENCOUNTER — HOSPITAL ENCOUNTER (OUTPATIENT)
Dept: RADIATION ONCOLOGY | Age: 84
Discharge: HOME | End: 2019-03-01
Attending: RADIOLOGY
Payer: MEDICARE

## 2019-03-01 PROCEDURE — 77387 GUIDANCE FOR RADJ TX DLVR: CPT | Performed by: RADIOLOGY

## 2019-03-01 PROCEDURE — 77412 RADIATION TX DELIVERY LVL 3: CPT | Performed by: RADIOLOGY

## 2019-03-04 ENCOUNTER — HOSPITAL ENCOUNTER (OUTPATIENT)
Dept: RADIATION ONCOLOGY | Age: 84
Discharge: HOME | End: 2019-03-04
Attending: RADIOLOGY
Payer: MEDICARE

## 2019-03-04 VITALS
WEIGHT: 120.4 LBS | OXYGEN SATURATION: 93 % | BODY MASS INDEX: 20.67 KG/M2 | DIASTOLIC BLOOD PRESSURE: 76 MMHG | HEART RATE: 65 BPM | TEMPERATURE: 97.5 F | SYSTOLIC BLOOD PRESSURE: 150 MMHG | RESPIRATION RATE: 16 BRPM

## 2019-03-04 DIAGNOSIS — C50.911 INFILTRATING DUCTAL CARCINOMA OF RIGHT BREAST (HCC): ICD-10-CM

## 2019-03-04 DIAGNOSIS — C79.51 SECONDARY ADENOCARCINOMA OF BONE (HCC): ICD-10-CM

## 2019-03-04 DIAGNOSIS — Z17.0 MALIGNANT NEOPLASM OF UPPER-OUTER QUADRANT OF RIGHT BREAST IN FEMALE, ESTROGEN RECEPTOR POSITIVE (HCC): Primary | ICD-10-CM

## 2019-03-04 DIAGNOSIS — C50.411 MALIGNANT NEOPLASM OF UPPER-OUTER QUADRANT OF RIGHT BREAST IN FEMALE, ESTROGEN RECEPTOR POSITIVE (HCC): Primary | ICD-10-CM

## 2019-03-04 PROCEDURE — 77412 RADIATION TX DELIVERY LVL 3: CPT | Performed by: RADIOLOGY

## 2019-03-04 PROCEDURE — 77387 GUIDANCE FOR RADJ TX DLVR: CPT | Performed by: RADIOLOGY

## 2019-03-05 ENCOUNTER — HOSPITAL ENCOUNTER (OUTPATIENT)
Dept: RADIATION ONCOLOGY | Age: 84
Discharge: HOME | End: 2019-03-05
Attending: RADIOLOGY
Payer: MEDICARE

## 2019-03-05 PROCEDURE — 77412 RADIATION TX DELIVERY LVL 3: CPT | Performed by: RADIOLOGY

## 2019-03-05 PROCEDURE — 77387 GUIDANCE FOR RADJ TX DLVR: CPT | Performed by: RADIOLOGY

## 2019-03-06 ENCOUNTER — HOSPITAL ENCOUNTER (OUTPATIENT)
Dept: RADIATION ONCOLOGY | Age: 84
Discharge: HOME | End: 2019-03-06
Attending: RADIOLOGY
Payer: MEDICARE

## 2019-03-06 ENCOUNTER — OFFICE VISIT (OUTPATIENT)
Dept: ORTHOPEDIC SURGERY | Age: 84
End: 2019-03-06

## 2019-03-06 VITALS
BODY MASS INDEX: 20.55 KG/M2 | HEART RATE: 68 BPM | WEIGHT: 120.37 LBS | DIASTOLIC BLOOD PRESSURE: 76 MMHG | SYSTOLIC BLOOD PRESSURE: 133 MMHG | HEIGHT: 64 IN

## 2019-03-06 DIAGNOSIS — Z98.890 STATUS POST-OPERATIVE REPAIR OF HIP FRACTURE: ICD-10-CM

## 2019-03-06 DIAGNOSIS — Z87.81 STATUS POST-OPERATIVE REPAIR OF HIP FRACTURE: ICD-10-CM

## 2019-03-06 DIAGNOSIS — S72.044D CLOSED NONDISPLACED BASICERVICAL FRACTURE OF RIGHT FEMUR WITH ROUTINE HEALING, SUBSEQUENT ENCOUNTER: Primary | ICD-10-CM

## 2019-03-06 PROCEDURE — 77387 GUIDANCE FOR RADJ TX DLVR: CPT | Performed by: RADIOLOGY

## 2019-03-06 PROCEDURE — 99024 POSTOP FOLLOW-UP VISIT: CPT | Performed by: ORTHOPAEDIC SURGERY

## 2019-03-06 PROCEDURE — 77336 RADIATION PHYSICS CONSULT: CPT | Performed by: RADIOLOGY

## 2019-03-06 PROCEDURE — 77412 RADIATION TX DELIVERY LVL 3: CPT | Performed by: RADIOLOGY

## 2019-03-08 ENCOUNTER — APPOINTMENT (OUTPATIENT)
Dept: CT IMAGING | Age: 84
DRG: 176 | End: 2019-03-08
Payer: MEDICARE

## 2019-03-08 ENCOUNTER — APPOINTMENT (OUTPATIENT)
Dept: ULTRASOUND IMAGING | Age: 84
DRG: 176 | End: 2019-03-08
Payer: MEDICARE

## 2019-03-08 ENCOUNTER — HOSPITAL ENCOUNTER (INPATIENT)
Age: 84
LOS: 3 days | Discharge: HOME HEALTH | DRG: 176 | End: 2019-03-11
Attending: EMERGENCY MEDICINE | Admitting: FAMILY MEDICINE
Payer: MEDICARE

## 2019-03-08 DIAGNOSIS — C79.51 BONE METASTASIS (HCC): ICD-10-CM

## 2019-03-08 DIAGNOSIS — Z17.0 MALIGNANT NEOPLASM OF UPPER-OUTER QUADRANT OF RIGHT BREAST IN FEMALE, ESTROGEN RECEPTOR POSITIVE (HCC): ICD-10-CM

## 2019-03-08 DIAGNOSIS — C50.911 INFILTRATING DUCTAL CARCINOMA OF RIGHT BREAST (HCC): ICD-10-CM

## 2019-03-08 DIAGNOSIS — C50.411 MALIGNANT NEOPLASM OF UPPER-OUTER QUADRANT OF RIGHT BREAST IN FEMALE, ESTROGEN RECEPTOR POSITIVE (HCC): ICD-10-CM

## 2019-03-08 DIAGNOSIS — R53.83 OTHER FATIGUE: ICD-10-CM

## 2019-03-08 DIAGNOSIS — J90 PLEURAL EFFUSION: ICD-10-CM

## 2019-03-08 DIAGNOSIS — I26.99 OTHER ACUTE PULMONARY EMBOLISM WITHOUT ACUTE COR PULMONALE (HCC): Primary | ICD-10-CM

## 2019-03-08 PROBLEM — M32.9 SYSTEMIC LUPUS ERYTHEMATOSUS (HCC): Status: ACTIVE | Noted: 2018-02-08

## 2019-03-08 PROBLEM — M48.062 SPINAL STENOSIS OF LUMBAR REGION WITH NEUROGENIC CLAUDICATION: Status: ACTIVE | Noted: 2018-12-03

## 2019-03-08 PROBLEM — K52.9 CHRONIC DIARRHEA: Status: ACTIVE | Noted: 2017-11-15

## 2019-03-08 PROBLEM — S72.001D CLOSED FRACTURE OF RIGHT HIP WITH ROUTINE HEALING: Status: ACTIVE | Noted: 2019-02-21

## 2019-03-08 LAB
ABSOLUTE EOS #: 0.03 K/UL (ref 0–0.4)
ABSOLUTE IMMATURE GRANULOCYTE: ABNORMAL K/UL (ref 0–0.3)
ABSOLUTE LYMPH #: 0.48 K/UL (ref 1–4.8)
ABSOLUTE MONO #: 0.11 K/UL (ref 0.1–0.8)
ANION GAP SERPL CALCULATED.3IONS-SCNC: 14 MMOL/L (ref 9–17)
BASOPHILS # BLD: 0 % (ref 0–2)
BASOPHILS ABSOLUTE: 0 K/UL (ref 0–0.2)
BNP INTERPRETATION: ABNORMAL
BUN BLDV-MCNC: 14 MG/DL (ref 8–23)
BUN/CREAT BLD: ABNORMAL (ref 9–20)
CALCIUM SERPL-MCNC: 9.5 MG/DL (ref 8.6–10.4)
CHLORIDE BLD-SCNC: 99 MMOL/L (ref 98–107)
CO2: 29 MMOL/L (ref 20–31)
CREAT SERPL-MCNC: 0.88 MG/DL (ref 0.5–0.9)
DIFFERENTIAL TYPE: ABNORMAL
EOSINOPHILS RELATIVE PERCENT: 1 % (ref 1–4)
GFR AFRICAN AMERICAN: >60 ML/MIN
GFR NON-AFRICAN AMERICAN: >60 ML/MIN
GFR SERPL CREATININE-BSD FRML MDRD: ABNORMAL ML/MIN/{1.73_M2}
GFR SERPL CREATININE-BSD FRML MDRD: ABNORMAL ML/MIN/{1.73_M2}
GLUCOSE BLD-MCNC: 90 MG/DL (ref 70–99)
HCT VFR BLD CALC: 35.2 % (ref 36–46)
HEMOGLOBIN: 11.7 G/DL (ref 12–16)
IMMATURE GRANULOCYTES: ABNORMAL %
INR BLD: 1
LYMPHOCYTES # BLD: 17 % (ref 24–44)
MAGNESIUM: 2.1 MG/DL (ref 1.6–2.6)
MCH RBC QN AUTO: 38.1 PG (ref 26–34)
MCHC RBC AUTO-ENTMCNC: 33.2 G/DL (ref 31–37)
MCV RBC AUTO: 114.7 FL (ref 80–100)
MONOCYTES # BLD: 4 % (ref 1–7)
MORPHOLOGY: ABNORMAL
NRBC AUTOMATED: ABNORMAL PER 100 WBC
PARTIAL THROMBOPLASTIN TIME: 22.4 SEC (ref 21.3–31.3)
PDW BLD-RTO: 14.9 % (ref 12.5–15.4)
PLATELET # BLD: 203 K/UL (ref 140–450)
PLATELET ESTIMATE: ABNORMAL
PMV BLD AUTO: 8.6 FL (ref 8–14)
POTASSIUM SERPL-SCNC: 3.4 MMOL/L (ref 3.7–5.3)
PRO-BNP: 2249 PG/ML
PROTHROMBIN TIME: 10.1 SEC (ref 9.4–12.6)
RBC # BLD: 3.07 M/UL (ref 4–5.2)
RBC # BLD: ABNORMAL 10*6/UL
SEG NEUTROPHILS: 78 % (ref 36–66)
SEGMENTED NEUTROPHILS ABSOLUTE COUNT: 2.18 K/UL (ref 1.8–7.7)
SODIUM BLD-SCNC: 142 MMOL/L (ref 135–144)
TROPONIN INTERP: ABNORMAL
TROPONIN T: ABNORMAL NG/ML
TROPONIN, HIGH SENSITIVITY: 21 NG/L (ref 0–14)
TROPONIN, HIGH SENSITIVITY: 22 NG/L (ref 0–14)
TROPONIN, HIGH SENSITIVITY: 23 NG/L (ref 0–14)
WBC # BLD: 2.8 K/UL (ref 3.5–11)
WBC # BLD: ABNORMAL 10*3/UL

## 2019-03-08 PROCEDURE — 87088 URINE BACTERIA CULTURE: CPT

## 2019-03-08 PROCEDURE — 87186 SC STD MICRODIL/AGAR DIL: CPT

## 2019-03-08 PROCEDURE — 85610 PROTHROMBIN TIME: CPT

## 2019-03-08 PROCEDURE — 81001 URINALYSIS AUTO W/SCOPE: CPT

## 2019-03-08 PROCEDURE — 87086 URINE CULTURE/COLONY COUNT: CPT

## 2019-03-08 PROCEDURE — 93971 EXTREMITY STUDY: CPT

## 2019-03-08 PROCEDURE — 96372 THER/PROPH/DIAG INJ SC/IM: CPT

## 2019-03-08 PROCEDURE — 85025 COMPLETE CBC W/AUTO DIFF WBC: CPT

## 2019-03-08 PROCEDURE — 36415 COLL VENOUS BLD VENIPUNCTURE: CPT

## 2019-03-08 PROCEDURE — 1210000000 HC MED SURG R&B

## 2019-03-08 PROCEDURE — 2580000003 HC RX 258: Performed by: CLINICAL NURSE SPECIALIST

## 2019-03-08 PROCEDURE — 83735 ASSAY OF MAGNESIUM: CPT

## 2019-03-08 PROCEDURE — 85730 THROMBOPLASTIN TIME PARTIAL: CPT

## 2019-03-08 PROCEDURE — APPSS45 APP SPLIT SHARED TIME 31-45 MINUTES: Performed by: CLINICAL NURSE SPECIALIST

## 2019-03-08 PROCEDURE — 84484 ASSAY OF TROPONIN QUANT: CPT

## 2019-03-08 PROCEDURE — 6370000000 HC RX 637 (ALT 250 FOR IP): Performed by: CLINICAL NURSE SPECIALIST

## 2019-03-08 PROCEDURE — 6360000004 HC RX CONTRAST MEDICATION: Performed by: EMERGENCY MEDICINE

## 2019-03-08 PROCEDURE — 80048 BASIC METABOLIC PNL TOTAL CA: CPT

## 2019-03-08 PROCEDURE — 6360000002 HC RX W HCPCS: Performed by: CLINICAL NURSE SPECIALIST

## 2019-03-08 PROCEDURE — 93005 ELECTROCARDIOGRAM TRACING: CPT

## 2019-03-08 PROCEDURE — 71260 CT THORAX DX C+: CPT

## 2019-03-08 PROCEDURE — 6360000002 HC RX W HCPCS: Performed by: EMERGENCY MEDICINE

## 2019-03-08 PROCEDURE — 99285 EMERGENCY DEPT VISIT HI MDM: CPT

## 2019-03-08 PROCEDURE — 83880 ASSAY OF NATRIURETIC PEPTIDE: CPT

## 2019-03-08 PROCEDURE — 2580000003 HC RX 258: Performed by: EMERGENCY MEDICINE

## 2019-03-08 PROCEDURE — 99222 1ST HOSP IP/OBS MODERATE 55: CPT | Performed by: FAMILY MEDICINE

## 2019-03-08 RX ORDER — MAGNESIUM SULFATE 1 G/100ML
1 INJECTION INTRAVENOUS PRN
Status: DISCONTINUED | OUTPATIENT
Start: 2019-03-08 | End: 2019-03-11 | Stop reason: HOSPADM

## 2019-03-08 RX ORDER — NITROGLYCERIN 0.4 MG/1
0.4 TABLET SUBLINGUAL EVERY 5 MIN PRN
Status: DISCONTINUED | OUTPATIENT
Start: 2019-03-08 | End: 2019-03-11 | Stop reason: HOSPADM

## 2019-03-08 RX ORDER — PROPRANOLOL HYDROCHLORIDE 10 MG/1
10 TABLET ORAL DAILY
Status: DISCONTINUED | OUTPATIENT
Start: 2019-03-08 | End: 2019-03-11 | Stop reason: HOSPADM

## 2019-03-08 RX ORDER — POTASSIUM CHLORIDE 7.45 MG/ML
10 INJECTION INTRAVENOUS PRN
Status: DISCONTINUED | OUTPATIENT
Start: 2019-03-08 | End: 2019-03-08

## 2019-03-08 RX ORDER — PREDNISONE 10 MG/1
5 TABLET ORAL DAILY
Status: DISCONTINUED | OUTPATIENT
Start: 2019-03-08 | End: 2019-03-11 | Stop reason: HOSPADM

## 2019-03-08 RX ORDER — ASPIRIN 81 MG/1
81 TABLET, CHEWABLE ORAL DAILY
Status: DISCONTINUED | OUTPATIENT
Start: 2019-03-09 | End: 2019-03-10

## 2019-03-08 RX ORDER — PROCHLORPERAZINE MALEATE 10 MG
5 TABLET ORAL 2 TIMES DAILY
Status: ON HOLD | COMMUNITY
End: 2019-11-25 | Stop reason: SDUPTHER

## 2019-03-08 RX ORDER — DIAPER,BRIEF,INFANT-TODD,DISP
EACH MISCELLANEOUS 2 TIMES DAILY
Status: DISCONTINUED | OUTPATIENT
Start: 2019-03-08 | End: 2019-03-11 | Stop reason: HOSPADM

## 2019-03-08 RX ORDER — 0.9 % SODIUM CHLORIDE 0.9 %
80 INTRAVENOUS SOLUTION INTRAVENOUS ONCE
Status: COMPLETED | OUTPATIENT
Start: 2019-03-08 | End: 2019-03-08

## 2019-03-08 RX ORDER — ASPIRIN 81 MG/1
324 TABLET, CHEWABLE ORAL ONCE
Status: DISCONTINUED | OUTPATIENT
Start: 2019-03-08 | End: 2019-03-08

## 2019-03-08 RX ORDER — POTASSIUM CHLORIDE 20MEQ/15ML
40 LIQUID (ML) ORAL PRN
Status: DISCONTINUED | OUTPATIENT
Start: 2019-03-08 | End: 2019-03-11 | Stop reason: HOSPADM

## 2019-03-08 RX ORDER — ONDANSETRON 2 MG/ML
4 INJECTION INTRAMUSCULAR; INTRAVENOUS EVERY 6 HOURS PRN
Status: DISCONTINUED | OUTPATIENT
Start: 2019-03-08 | End: 2019-03-11 | Stop reason: HOSPADM

## 2019-03-08 RX ORDER — DOCUSATE SODIUM 100 MG/1
100 CAPSULE, LIQUID FILLED ORAL 2 TIMES DAILY PRN
Status: DISCONTINUED | OUTPATIENT
Start: 2019-03-08 | End: 2019-03-11 | Stop reason: HOSPADM

## 2019-03-08 RX ORDER — ALPRAZOLAM 0.25 MG/1
0.38 TABLET ORAL NIGHTLY
Status: DISCONTINUED | OUTPATIENT
Start: 2019-03-08 | End: 2019-03-11 | Stop reason: HOSPADM

## 2019-03-08 RX ORDER — HEPARIN SODIUM 1000 [USP'U]/ML
80 INJECTION, SOLUTION INTRAVENOUS; SUBCUTANEOUS ONCE
Status: DISCONTINUED | OUTPATIENT
Start: 2019-03-08 | End: 2019-03-08

## 2019-03-08 RX ORDER — LOPERAMIDE HYDROCHLORIDE 2 MG/1
2 CAPSULE ORAL NIGHTLY
Status: ON HOLD | COMMUNITY
End: 2019-09-05

## 2019-03-08 RX ORDER — PROCHLORPERAZINE MALEATE 10 MG
10 TABLET ORAL 2 TIMES DAILY
Status: DISCONTINUED | OUTPATIENT
Start: 2019-03-08 | End: 2019-03-10

## 2019-03-08 RX ORDER — SODIUM CHLORIDE 0.9 % (FLUSH) 0.9 %
10 SYRINGE (ML) INJECTION PRN
Status: DISCONTINUED | OUTPATIENT
Start: 2019-03-08 | End: 2019-03-08

## 2019-03-08 RX ORDER — POTASSIUM CHLORIDE 7.45 MG/ML
10 INJECTION INTRAVENOUS PRN
Status: DISCONTINUED | OUTPATIENT
Start: 2019-03-08 | End: 2019-03-11 | Stop reason: HOSPADM

## 2019-03-08 RX ORDER — SODIUM CHLORIDE 0.9 % (FLUSH) 0.9 %
10 SYRINGE (ML) INJECTION PRN
Status: DISCONTINUED | OUTPATIENT
Start: 2019-03-08 | End: 2019-03-11 | Stop reason: HOSPADM

## 2019-03-08 RX ORDER — ALPRAZOLAM 0.25 MG/1
0.25 TABLET ORAL 3 TIMES DAILY PRN
Status: DISCONTINUED | OUTPATIENT
Start: 2019-03-08 | End: 2019-03-08

## 2019-03-08 RX ORDER — SODIUM CHLORIDE 0.9 % (FLUSH) 0.9 %
10 SYRINGE (ML) INJECTION EVERY 12 HOURS SCHEDULED
Status: DISCONTINUED | OUTPATIENT
Start: 2019-03-08 | End: 2019-03-11 | Stop reason: HOSPADM

## 2019-03-08 RX ORDER — EXEMESTANE 25 MG/1
25 TABLET ORAL DAILY
Status: DISCONTINUED | OUTPATIENT
Start: 2019-03-08 | End: 2019-03-11 | Stop reason: HOSPADM

## 2019-03-08 RX ORDER — HEPARIN SODIUM 10000 [USP'U]/100ML
18 INJECTION, SOLUTION INTRAVENOUS CONTINUOUS
Status: DISCONTINUED | OUTPATIENT
Start: 2019-03-08 | End: 2019-03-08

## 2019-03-08 RX ORDER — ALPRAZOLAM 0.25 MG/1
0.12 TABLET ORAL DAILY
Status: DISCONTINUED | OUTPATIENT
Start: 2019-03-08 | End: 2019-03-11 | Stop reason: HOSPADM

## 2019-03-08 RX ORDER — LOPERAMIDE HYDROCHLORIDE 2 MG/1
2 CAPSULE ORAL 4 TIMES DAILY PRN
Status: DISCONTINUED | OUTPATIENT
Start: 2019-03-08 | End: 2019-03-11 | Stop reason: HOSPADM

## 2019-03-08 RX ORDER — POTASSIUM CHLORIDE 20 MEQ/1
40 TABLET, EXTENDED RELEASE ORAL PRN
Status: DISCONTINUED | OUTPATIENT
Start: 2019-03-08 | End: 2019-03-11 | Stop reason: HOSPADM

## 2019-03-08 RX ADMIN — IOVERSOL 75 ML: 741 INJECTION INTRA-ARTERIAL; INTRAVENOUS at 11:43

## 2019-03-08 RX ADMIN — EXEMESTANE 25 MG: 25 TABLET ORAL at 16:49

## 2019-03-08 RX ADMIN — Medication 10 ML: at 20:50

## 2019-03-08 RX ADMIN — ALPRAZOLAM 0.12 MG: 0.25 TABLET ORAL at 16:50

## 2019-03-08 RX ADMIN — Medication 10 ML: at 11:44

## 2019-03-08 RX ADMIN — SODIUM CHLORIDE 80 ML: 9 INJECTION, SOLUTION INTRAVENOUS at 11:44

## 2019-03-08 RX ADMIN — PROPRANOLOL HYDROCHLORIDE 10 MG: 10 TABLET ORAL at 20:33

## 2019-03-08 RX ADMIN — PREDNISONE 5 MG: 10 TABLET ORAL at 16:51

## 2019-03-08 RX ADMIN — LOPERAMIDE HYDROCHLORIDE 2 MG: 2 CAPSULE ORAL at 20:42

## 2019-03-08 RX ADMIN — APIXABAN 10 MG: 5 TABLET, FILM COATED ORAL at 20:33

## 2019-03-08 RX ADMIN — ENOXAPARIN SODIUM 50 MG: 60 INJECTION SUBCUTANEOUS at 13:03

## 2019-03-08 RX ADMIN — ENOXAPARIN SODIUM 50 MG: 60 INJECTION SUBCUTANEOUS at 20:34

## 2019-03-08 RX ADMIN — ALPRAZOLAM 0.38 MG: 0.25 TABLET ORAL at 20:33

## 2019-03-08 ASSESSMENT — PAIN DESCRIPTION - FREQUENCY: FREQUENCY: CONTINUOUS

## 2019-03-08 ASSESSMENT — PAIN DESCRIPTION - ORIENTATION: ORIENTATION: MID;UPPER

## 2019-03-08 ASSESSMENT — PAIN DESCRIPTION - PROGRESSION: CLINICAL_PROGRESSION: NOT CHANGED

## 2019-03-08 ASSESSMENT — PAIN SCALES - GENERAL
PAINLEVEL_OUTOF10: 2
PAINLEVEL_OUTOF10: 0

## 2019-03-08 ASSESSMENT — PAIN DESCRIPTION - ONSET: ONSET: PROGRESSIVE

## 2019-03-08 ASSESSMENT — PAIN DESCRIPTION - LOCATION: LOCATION: CHEST

## 2019-03-08 ASSESSMENT — PAIN DESCRIPTION - PAIN TYPE: TYPE: ACUTE PAIN

## 2019-03-08 ASSESSMENT — PAIN DESCRIPTION - DESCRIPTORS: DESCRIPTORS: PRESSURE

## 2019-03-09 ENCOUNTER — APPOINTMENT (OUTPATIENT)
Dept: GENERAL RADIOLOGY | Age: 84
DRG: 176 | End: 2019-03-09
Payer: MEDICARE

## 2019-03-09 LAB
-: ABNORMAL
ALBUMIN SERPL-MCNC: 3.4 G/DL (ref 3.5–5.2)
ALBUMIN/GLOBULIN RATIO: 1.1 (ref 1–2.5)
ALP BLD-CCNC: 39 U/L (ref 35–104)
ALT SERPL-CCNC: 9 U/L (ref 5–33)
AMORPHOUS: ABNORMAL
ANION GAP SERPL CALCULATED.3IONS-SCNC: 14 MMOL/L (ref 9–17)
AST SERPL-CCNC: 15 U/L
BACTERIA: ABNORMAL
BILIRUB SERPL-MCNC: 0.42 MG/DL (ref 0.3–1.2)
BILIRUBIN URINE: NEGATIVE
BNP INTERPRETATION: ABNORMAL
BUN BLDV-MCNC: 15 MG/DL (ref 8–23)
BUN/CREAT BLD: ABNORMAL (ref 9–20)
CALCIUM SERPL-MCNC: 8.9 MG/DL (ref 8.6–10.4)
CASTS UA: ABNORMAL /LPF
CHLORIDE BLD-SCNC: 100 MMOL/L (ref 98–107)
CO2: 25 MMOL/L (ref 20–31)
COLOR: YELLOW
COMMENT UA: ABNORMAL
CREAT SERPL-MCNC: 0.7 MG/DL (ref 0.5–0.9)
CRYSTALS, UA: ABNORMAL /HPF
EKG ATRIAL RATE: 62 BPM
EKG ATRIAL RATE: 63 BPM
EKG P AXIS: 82 DEGREES
EKG P-R INTERVAL: 104 MS
EKG P-R INTERVAL: 132 MS
EKG Q-T INTERVAL: 440 MS
EKG Q-T INTERVAL: 470 MS
EKG QRS DURATION: 86 MS
EKG QRS DURATION: 90 MS
EKG QTC CALCULATION (BAZETT): 450 MS
EKG QTC CALCULATION (BAZETT): 477 MS
EKG R AXIS: -24 DEGREES
EKG R AXIS: -30 DEGREES
EKG T AXIS: -17 DEGREES
EKG T AXIS: -3 DEGREES
EKG VENTRICULAR RATE: 62 BPM
EKG VENTRICULAR RATE: 63 BPM
EPITHELIAL CELLS UA: ABNORMAL /HPF (ref 0–5)
FOLATE: >20 NG/ML
FOLATE: >20 NG/ML
GFR AFRICAN AMERICAN: >60 ML/MIN
GFR NON-AFRICAN AMERICAN: >60 ML/MIN
GFR SERPL CREATININE-BSD FRML MDRD: ABNORMAL ML/MIN/{1.73_M2}
GFR SERPL CREATININE-BSD FRML MDRD: ABNORMAL ML/MIN/{1.73_M2}
GLUCOSE BLD-MCNC: 94 MG/DL (ref 70–99)
GLUCOSE URINE: NEGATIVE
HCT VFR BLD CALC: 32.9 % (ref 36–46)
HEMOGLOBIN: 10.7 G/DL (ref 12–16)
IRON SATURATION: 46 % (ref 20–55)
IRON: 119 UG/DL (ref 37–145)
KETONES, URINE: ABNORMAL
LEUKOCYTE ESTERASE, URINE: ABNORMAL
MAGNESIUM: 2 MG/DL (ref 1.6–2.6)
MCH RBC QN AUTO: 38.4 PG (ref 26–34)
MCHC RBC AUTO-ENTMCNC: 32.5 G/DL (ref 31–37)
MCV RBC AUTO: 117.9 FL (ref 80–100)
MUCUS: ABNORMAL
NITRITE, URINE: POSITIVE
NRBC AUTOMATED: ABNORMAL PER 100 WBC
OTHER OBSERVATIONS UA: ABNORMAL
PDW BLD-RTO: 15.1 % (ref 12.5–15.4)
PH UA: 6.5 (ref 5–8)
PLATELET # BLD: 189 K/UL (ref 140–450)
PMV BLD AUTO: 9 FL (ref 8–14)
POTASSIUM SERPL-SCNC: 3.6 MMOL/L (ref 3.7–5.3)
PRO-BNP: 2052 PG/ML
PROTEIN UA: ABNORMAL
RBC # BLD: 2.79 M/UL (ref 4–5.2)
RBC UA: ABNORMAL /HPF (ref 0–2)
RENAL EPITHELIAL, UA: ABNORMAL /HPF
SODIUM BLD-SCNC: 139 MMOL/L (ref 135–144)
SPECIFIC GRAVITY UA: 1.02 (ref 1–1.03)
TOTAL IRON BINDING CAPACITY: 261 UG/DL (ref 250–450)
TOTAL PROTEIN: 6.4 G/DL (ref 6.4–8.3)
TRICHOMONAS: ABNORMAL
TROPONIN INTERP: ABNORMAL
TROPONIN INTERP: ABNORMAL
TROPONIN T: ABNORMAL NG/ML
TROPONIN T: ABNORMAL NG/ML
TROPONIN, HIGH SENSITIVITY: 19 NG/L (ref 0–14)
TROPONIN, HIGH SENSITIVITY: 21 NG/L (ref 0–14)
TSH SERPL DL<=0.05 MIU/L-ACNC: 6.51 MIU/L (ref 0.3–5)
TURBIDITY: ABNORMAL
UNSATURATED IRON BINDING CAPACITY: 142 UG/DL (ref 112–347)
URINE HGB: ABNORMAL
UROBILINOGEN, URINE: NORMAL
VITAMIN B-12: 1603 PG/ML (ref 232–1245)
VITAMIN B-12: 1696 PG/ML (ref 232–1245)
VITAMIN D 25-HYDROXY: 116.5 NG/ML (ref 30–100)
WBC # BLD: 2.2 K/UL (ref 3.5–11)
WBC UA: ABNORMAL /HPF (ref 0–5)
YEAST: ABNORMAL

## 2019-03-09 PROCEDURE — 6370000000 HC RX 637 (ALT 250 FOR IP): Performed by: NURSE PRACTITIONER

## 2019-03-09 PROCEDURE — 93005 ELECTROCARDIOGRAM TRACING: CPT

## 2019-03-09 PROCEDURE — 82607 VITAMIN B-12: CPT

## 2019-03-09 PROCEDURE — 84484 ASSAY OF TROPONIN QUANT: CPT

## 2019-03-09 PROCEDURE — 80053 COMPREHEN METABOLIC PANEL: CPT

## 2019-03-09 PROCEDURE — 82378 CARCINOEMBRYONIC ANTIGEN: CPT

## 2019-03-09 PROCEDURE — 86301 IMMUNOASSAY TUMOR CA 19-9: CPT

## 2019-03-09 PROCEDURE — C9113 INJ PANTOPRAZOLE SODIUM, VIA: HCPCS | Performed by: NURSE PRACTITIONER

## 2019-03-09 PROCEDURE — 6370000000 HC RX 637 (ALT 250 FOR IP): Performed by: CLINICAL NURSE SPECIALIST

## 2019-03-09 PROCEDURE — 6360000002 HC RX W HCPCS: Performed by: NURSE PRACTITIONER

## 2019-03-09 PROCEDURE — 6370000000 HC RX 637 (ALT 250 FOR IP)

## 2019-03-09 PROCEDURE — 6370000000 HC RX 637 (ALT 250 FOR IP): Performed by: FAMILY MEDICINE

## 2019-03-09 PROCEDURE — 86304 IMMUNOASSAY TUMOR CA 125: CPT

## 2019-03-09 PROCEDURE — 6360000002 HC RX W HCPCS

## 2019-03-09 PROCEDURE — 83550 IRON BINDING TEST: CPT

## 2019-03-09 PROCEDURE — 71046 X-RAY EXAM CHEST 2 VIEWS: CPT

## 2019-03-09 PROCEDURE — 84439 ASSAY OF FREE THYROXINE: CPT

## 2019-03-09 PROCEDURE — 6360000002 HC RX W HCPCS: Performed by: FAMILY MEDICINE

## 2019-03-09 PROCEDURE — 2580000003 HC RX 258: Performed by: NURSE PRACTITIONER

## 2019-03-09 PROCEDURE — 2580000003 HC RX 258: Performed by: CLINICAL NURSE SPECIALIST

## 2019-03-09 PROCEDURE — 86300 IMMUNOASSAY TUMOR CA 15-3: CPT

## 2019-03-09 PROCEDURE — 83735 ASSAY OF MAGNESIUM: CPT

## 2019-03-09 PROCEDURE — 1210000000 HC MED SURG R&B

## 2019-03-09 PROCEDURE — 99233 SBSQ HOSP IP/OBS HIGH 50: CPT | Performed by: FAMILY MEDICINE

## 2019-03-09 PROCEDURE — 85027 COMPLETE CBC AUTOMATED: CPT

## 2019-03-09 PROCEDURE — 83540 ASSAY OF IRON: CPT

## 2019-03-09 PROCEDURE — 83880 ASSAY OF NATRIURETIC PEPTIDE: CPT

## 2019-03-09 PROCEDURE — 82746 ASSAY OF FOLIC ACID SERUM: CPT

## 2019-03-09 PROCEDURE — 97116 GAIT TRAINING THERAPY: CPT

## 2019-03-09 PROCEDURE — 99497 ADVNCD CARE PLAN 30 MIN: CPT | Performed by: FAMILY MEDICINE

## 2019-03-09 PROCEDURE — 36415 COLL VENOUS BLD VENIPUNCTURE: CPT

## 2019-03-09 PROCEDURE — 84443 ASSAY THYROID STIM HORMONE: CPT

## 2019-03-09 PROCEDURE — 82306 VITAMIN D 25 HYDROXY: CPT

## 2019-03-09 PROCEDURE — 97162 PT EVAL MOD COMPLEX 30 MIN: CPT

## 2019-03-09 RX ORDER — UREA 10 %
3 LOTION (ML) TOPICAL NIGHTLY PRN
Status: DISCONTINUED | OUTPATIENT
Start: 2019-03-09 | End: 2019-03-11 | Stop reason: HOSPADM

## 2019-03-09 RX ORDER — MAGNESIUM HYDROXIDE/ALUMINUM HYDROXICE/SIMETHICONE 120; 1200; 1200 MG/30ML; MG/30ML; MG/30ML
5 SUSPENSION ORAL ONCE
Status: COMPLETED | OUTPATIENT
Start: 2019-03-09 | End: 2019-03-09

## 2019-03-09 RX ORDER — 0.9 % SODIUM CHLORIDE 0.9 %
500 INTRAVENOUS SOLUTION INTRAVENOUS ONCE
Status: COMPLETED | OUTPATIENT
Start: 2019-03-09 | End: 2019-03-09

## 2019-03-09 RX ORDER — METOCLOPRAMIDE HYDROCHLORIDE 5 MG/ML
5 INJECTION INTRAMUSCULAR; INTRAVENOUS
Status: DISCONTINUED | OUTPATIENT
Start: 2019-03-09 | End: 2019-03-10

## 2019-03-09 RX ORDER — 0.9 % SODIUM CHLORIDE 0.9 %
10 VIAL (ML) INJECTION DAILY
Status: DISCONTINUED | OUTPATIENT
Start: 2019-03-09 | End: 2019-03-11 | Stop reason: HOSPADM

## 2019-03-09 RX ORDER — PANTOPRAZOLE SODIUM 40 MG/10ML
40 INJECTION, POWDER, LYOPHILIZED, FOR SOLUTION INTRAVENOUS DAILY
Status: DISCONTINUED | OUTPATIENT
Start: 2019-03-09 | End: 2019-03-10 | Stop reason: ALTCHOICE

## 2019-03-09 RX ORDER — FENTANYL 12 UG/H
1 PATCH TRANSDERMAL
Status: DISCONTINUED | OUTPATIENT
Start: 2019-03-09 | End: 2019-03-11 | Stop reason: HOSPADM

## 2019-03-09 RX ORDER — DIPHENHYDRAMINE HCL 12.5MG/5ML
12.5 LIQUID (ML) ORAL ONCE
Status: DISCONTINUED | OUTPATIENT
Start: 2019-03-09 | End: 2019-03-10

## 2019-03-09 RX ORDER — PROCHLORPERAZINE MALEATE 5 MG/1
TABLET ORAL
Status: COMPLETED
Start: 2019-03-09 | End: 2019-03-09

## 2019-03-09 RX ORDER — FENTANYL 25 UG/H
PATCH TRANSDERMAL
Status: DISPENSED
Start: 2019-03-09 | End: 2019-03-10

## 2019-03-09 RX ORDER — FUROSEMIDE 10 MG/ML
40 INJECTION INTRAMUSCULAR; INTRAVENOUS ONCE
Status: COMPLETED | OUTPATIENT
Start: 2019-03-09 | End: 2019-03-09

## 2019-03-09 RX ORDER — DIPHENHYDRAMINE HCL 12.5MG/5ML
12.5 LIQUID (ML) ORAL ONCE
Status: COMPLETED | OUTPATIENT
Start: 2019-03-09 | End: 2019-03-09

## 2019-03-09 RX ORDER — FENTANYL 12 UG/H
PATCH TRANSDERMAL
Status: DISCONTINUED
Start: 2019-03-09 | End: 2019-03-11 | Stop reason: HOSPADM

## 2019-03-09 RX ORDER — MAGNESIUM HYDROXIDE/ALUMINUM HYDROXICE/SIMETHICONE 120; 1200; 1200 MG/30ML; MG/30ML; MG/30ML
30 SUSPENSION ORAL ONCE
Status: COMPLETED | OUTPATIENT
Start: 2019-03-09 | End: 2019-03-09

## 2019-03-09 RX ORDER — HYDROCODONE BITARTRATE AND ACETAMINOPHEN 5; 325 MG/1; MG/1
1 TABLET ORAL EVERY 4 HOURS PRN
Status: DISCONTINUED | OUTPATIENT
Start: 2019-03-09 | End: 2019-03-11 | Stop reason: HOSPADM

## 2019-03-09 RX ORDER — MAGNESIUM HYDROXIDE/ALUMINUM HYDROXICE/SIMETHICONE 120; 1200; 1200 MG/30ML; MG/30ML; MG/30ML
5 SUSPENSION ORAL ONCE
Status: DISCONTINUED | OUTPATIENT
Start: 2019-03-09 | End: 2019-03-10

## 2019-03-09 RX ORDER — METOCLOPRAMIDE HYDROCHLORIDE 5 MG/ML
5 INJECTION INTRAMUSCULAR; INTRAVENOUS ONCE
Status: COMPLETED | OUTPATIENT
Start: 2019-03-09 | End: 2019-03-09

## 2019-03-09 RX ORDER — FUROSEMIDE 20 MG/1
TABLET ORAL
Status: COMPLETED
Start: 2019-03-09 | End: 2019-03-09

## 2019-03-09 RX ORDER — HYDROCODONE BITARTRATE AND ACETAMINOPHEN 5; 325 MG/1; MG/1
2 TABLET ORAL EVERY 4 HOURS PRN
Status: DISCONTINUED | OUTPATIENT
Start: 2019-03-09 | End: 2019-03-11 | Stop reason: HOSPADM

## 2019-03-09 RX ORDER — FLUCONAZOLE 100 MG/1
200 TABLET ORAL DAILY
Status: DISCONTINUED | OUTPATIENT
Start: 2019-03-09 | End: 2019-03-10

## 2019-03-09 RX ORDER — CALCIUM CARBONATE 200(500)MG
1000 TABLET,CHEWABLE ORAL 3 TIMES DAILY PRN
Status: DISCONTINUED | OUTPATIENT
Start: 2019-03-09 | End: 2019-03-11 | Stop reason: HOSPADM

## 2019-03-09 RX ADMIN — CALCIUM CARBONATE 1000 MG: 500 TABLET, CHEWABLE ORAL at 09:45

## 2019-03-09 RX ADMIN — HYDROCORTISONE: 10 CREAM TOPICAL at 08:49

## 2019-03-09 RX ADMIN — HYDROMORPHONE HYDROCHLORIDE 0.5 MG: 1 INJECTION, SOLUTION INTRAMUSCULAR; INTRAVENOUS; SUBCUTANEOUS at 13:13

## 2019-03-09 RX ADMIN — ALUMINUM HYDROXIDE, MAGNESIUM HYDROXIDE, AND SIMETHICONE 5 ML: 200; 200; 20 SUSPENSION ORAL at 12:44

## 2019-03-09 RX ADMIN — Medication 10 ML: at 08:49

## 2019-03-09 RX ADMIN — ALPRAZOLAM 0.38 MG: 0.25 TABLET ORAL at 21:57

## 2019-03-09 RX ADMIN — ALPRAZOLAM 0.12 MG: 0.25 TABLET ORAL at 12:35

## 2019-03-09 RX ADMIN — ALUMINUM HYDROXIDE, MAGNESIUM HYDROXIDE, AND SIMETHICONE 30 ML: 200; 200; 20 SUSPENSION ORAL at 12:34

## 2019-03-09 RX ADMIN — PROPRANOLOL HYDROCHLORIDE 10 MG: 10 TABLET ORAL at 21:57

## 2019-03-09 RX ADMIN — SODIUM CHLORIDE 500 ML: 0.9 INJECTION, SOLUTION INTRAVENOUS at 20:04

## 2019-03-09 RX ADMIN — Medication 10 MG: at 08:45

## 2019-03-09 RX ADMIN — LIDOCAINE HYDROCHLORIDE 5 ML: 20 SOLUTION ORAL; TOPICAL at 17:33

## 2019-03-09 RX ADMIN — Medication 10 ML: at 05:40

## 2019-03-09 RX ADMIN — PREDNISONE 5 MG: 10 TABLET ORAL at 08:46

## 2019-03-09 RX ADMIN — ASPIRIN 81 MG 81 MG: 81 TABLET ORAL at 08:46

## 2019-03-09 RX ADMIN — EXEMESTANE 25 MG: 25 TABLET ORAL at 08:46

## 2019-03-09 RX ADMIN — HYDROMORPHONE HYDROCHLORIDE 0.5 MG: 1 INJECTION, SOLUTION INTRAMUSCULAR; INTRAVENOUS; SUBCUTANEOUS at 17:41

## 2019-03-09 RX ADMIN — METOCLOPRAMIDE 5 MG: 5 INJECTION, SOLUTION INTRAMUSCULAR; INTRAVENOUS at 17:41

## 2019-03-09 RX ADMIN — PROCHLORPERAZINE MALEATE 10 MG: 5 TABLET, FILM COATED ORAL at 08:45

## 2019-03-09 RX ADMIN — HYDROCORTISONE: 10 CREAM TOPICAL at 21:59

## 2019-03-09 RX ADMIN — DIPHENHYDRAMINE HYDROCHLORIDE 12.5 MG: 25 SOLUTION ORAL at 12:34

## 2019-03-09 RX ADMIN — HYDROCODONE BITARTRATE AND ACETAMINOPHEN 2 TABLET: 5; 325 TABLET ORAL at 14:44

## 2019-03-09 RX ADMIN — FLUCONAZOLE 200 MG: 100 TABLET ORAL at 12:35

## 2019-03-09 RX ADMIN — HYDROCODONE BITARTRATE AND ACETAMINOPHEN 2 TABLET: 5; 325 TABLET ORAL at 05:40

## 2019-03-09 RX ADMIN — FUROSEMIDE 40 MG: 10 INJECTION, SOLUTION INTRAMUSCULAR; INTRAVENOUS at 12:45

## 2019-03-09 RX ADMIN — HYDROCODONE BITARTRATE AND ACETAMINOPHEN 1 TABLET: 5; 325 TABLET ORAL at 09:44

## 2019-03-09 RX ADMIN — PROCHLORPERAZINE MALEATE 10 MG: 5 TABLET, FILM COATED ORAL at 22:00

## 2019-03-09 RX ADMIN — METOCLOPRAMIDE 5 MG: 5 INJECTION, SOLUTION INTRAMUSCULAR; INTRAVENOUS at 12:35

## 2019-03-09 RX ADMIN — FUROSEMIDE 20 MG: 20 TABLET ORAL at 12:36

## 2019-03-09 RX ADMIN — Medication 10 MG: at 22:00

## 2019-03-09 RX ADMIN — NITROGLYCERIN 0.4 MG: 0.4 TABLET SUBLINGUAL at 05:07

## 2019-03-09 RX ADMIN — METOCLOPRAMIDE 5 MG: 5 INJECTION, SOLUTION INTRAMUSCULAR; INTRAVENOUS at 21:59

## 2019-03-09 RX ADMIN — CALCIUM CARBONATE 1000 MG: 500 TABLET, CHEWABLE ORAL at 00:26

## 2019-03-09 RX ADMIN — LIDOCAINE HYDROCHLORIDE 5 ML: 20 SOLUTION ORAL; TOPICAL at 12:34

## 2019-03-09 RX ADMIN — PANTOPRAZOLE SODIUM 40 MG: 40 INJECTION, POWDER, FOR SOLUTION INTRAVENOUS at 05:40

## 2019-03-09 RX ADMIN — APIXABAN 10 MG: 5 TABLET, FILM COATED ORAL at 08:46

## 2019-03-09 ASSESSMENT — PAIN DESCRIPTION - DESCRIPTORS
DESCRIPTORS: PRESSURE
DESCRIPTORS: PRESSURE

## 2019-03-09 ASSESSMENT — PAIN SCALES - GENERAL
PAINLEVEL_OUTOF10: 10
PAINLEVEL_OUTOF10: 0
PAINLEVEL_OUTOF10: 10
PAINLEVEL_OUTOF10: 0
PAINLEVEL_OUTOF10: 3
PAINLEVEL_OUTOF10: 0
PAINLEVEL_OUTOF10: 8
PAINLEVEL_OUTOF10: 10
PAINLEVEL_OUTOF10: 10
PAINLEVEL_OUTOF10: 6
PAINLEVEL_OUTOF10: 8
PAINLEVEL_OUTOF10: 8
PAINLEVEL_OUTOF10: 0

## 2019-03-09 ASSESSMENT — PAIN DESCRIPTION - ORIENTATION
ORIENTATION: MID;UPPER

## 2019-03-09 ASSESSMENT — PAIN DESCRIPTION - FREQUENCY
FREQUENCY: CONTINUOUS
FREQUENCY: CONTINUOUS

## 2019-03-09 ASSESSMENT — PAIN DESCRIPTION - PAIN TYPE
TYPE: ACUTE PAIN

## 2019-03-09 ASSESSMENT — PAIN DESCRIPTION - PROGRESSION: CLINICAL_PROGRESSION: NOT CHANGED

## 2019-03-09 ASSESSMENT — PAIN DESCRIPTION - ONSET
ONSET: ON-GOING
ONSET: ON-GOING

## 2019-03-09 ASSESSMENT — PAIN DESCRIPTION - LOCATION
LOCATION: CHEST

## 2019-03-09 ASSESSMENT — PAIN - FUNCTIONAL ASSESSMENT: PAIN_FUNCTIONAL_ASSESSMENT: PREVENTS OR INTERFERES SOME ACTIVE ACTIVITIES AND ADLS

## 2019-03-10 ENCOUNTER — APPOINTMENT (OUTPATIENT)
Dept: GENERAL RADIOLOGY | Age: 84
DRG: 176 | End: 2019-03-10
Payer: MEDICARE

## 2019-03-10 ENCOUNTER — APPOINTMENT (OUTPATIENT)
Dept: CT IMAGING | Age: 84
DRG: 176 | End: 2019-03-10
Payer: MEDICARE

## 2019-03-10 LAB
-: NORMAL
ABSOLUTE EOS #: 0 K/UL (ref 0–0.4)
ABSOLUTE IMMATURE GRANULOCYTE: ABNORMAL K/UL (ref 0–0.3)
ABSOLUTE LYMPH #: 0.23 K/UL (ref 1–4.8)
ABSOLUTE MONO #: 0.17 K/UL (ref 0.1–0.8)
BASOPHILS # BLD: 0 % (ref 0–2)
BASOPHILS ABSOLUTE: 0 K/UL (ref 0–0.2)
CULTURE: ABNORMAL
DIFFERENTIAL TYPE: ABNORMAL
EOSINOPHILS RELATIVE PERCENT: 0 % (ref 1–4)
HCT VFR BLD CALC: 33.4 % (ref 36–46)
HEMOGLOBIN: 11.4 G/DL (ref 12–16)
IMMATURE GRANULOCYTES: ABNORMAL %
LYMPHOCYTES # BLD: 7 % (ref 24–44)
Lab: ABNORMAL
MCH RBC QN AUTO: 38 PG (ref 26–34)
MCHC RBC AUTO-ENTMCNC: 34 G/DL (ref 31–37)
MCV RBC AUTO: 111.9 FL (ref 80–100)
MONOCYTES # BLD: 5 % (ref 1–7)
MORPHOLOGY: ABNORMAL
NRBC AUTOMATED: ABNORMAL PER 100 WBC
PDW BLD-RTO: 16.9 % (ref 12.5–15.4)
PLATELET # BLD: 145 K/UL (ref 140–450)
PLATELET ESTIMATE: ABNORMAL
PMV BLD AUTO: 7.6 FL (ref 6–12)
RBC # BLD: 2.99 M/UL (ref 4–5.2)
RBC # BLD: ABNORMAL 10*6/UL
REASON FOR REJECTION: NORMAL
SEG NEUTROPHILS: 88 % (ref 36–66)
SEGMENTED NEUTROPHILS ABSOLUTE COUNT: 2.9 K/UL (ref 1.8–7.7)
SPECIMEN DESCRIPTION: ABNORMAL
THYROXINE, FREE: 1.17 NG/DL (ref 0.93–1.7)
VITAMIN D 25-HYDROXY: 131.8 NG/ML (ref 30–100)
WBC # BLD: 3.3 K/UL (ref 3.5–11)
WBC # BLD: ABNORMAL 10*3/UL
ZZ NTE CLEAN UP: ORDERED TEST: NORMAL
ZZ NTE WITH NAME CLEAN UP: SPECIMEN SOURCE: NORMAL

## 2019-03-10 PROCEDURE — 6360000002 HC RX W HCPCS: Performed by: FAMILY MEDICINE

## 2019-03-10 PROCEDURE — 1210000000 HC MED SURG R&B

## 2019-03-10 PROCEDURE — 74018 RADEX ABDOMEN 1 VIEW: CPT

## 2019-03-10 PROCEDURE — C9113 INJ PANTOPRAZOLE SODIUM, VIA: HCPCS | Performed by: NURSE PRACTITIONER

## 2019-03-10 PROCEDURE — 6360000002 HC RX W HCPCS: Performed by: NURSE PRACTITIONER

## 2019-03-10 PROCEDURE — 6370000000 HC RX 637 (ALT 250 FOR IP): Performed by: FAMILY MEDICINE

## 2019-03-10 PROCEDURE — 71250 CT THORAX DX C-: CPT

## 2019-03-10 PROCEDURE — 6370000000 HC RX 637 (ALT 250 FOR IP): Performed by: CLINICAL NURSE SPECIALIST

## 2019-03-10 PROCEDURE — 6370000000 HC RX 637 (ALT 250 FOR IP)

## 2019-03-10 PROCEDURE — 6370000000 HC RX 637 (ALT 250 FOR IP): Performed by: NURSE PRACTITIONER

## 2019-03-10 PROCEDURE — 2580000003 HC RX 258: Performed by: CLINICAL NURSE SPECIALIST

## 2019-03-10 PROCEDURE — 99223 1ST HOSP IP/OBS HIGH 75: CPT | Performed by: INTERNAL MEDICINE

## 2019-03-10 PROCEDURE — 2580000003 HC RX 258: Performed by: NURSE PRACTITIONER

## 2019-03-10 PROCEDURE — 99232 SBSQ HOSP IP/OBS MODERATE 35: CPT | Performed by: FAMILY MEDICINE

## 2019-03-10 PROCEDURE — 85025 COMPLETE CBC W/AUTO DIFF WBC: CPT

## 2019-03-10 PROCEDURE — 6370000000 HC RX 637 (ALT 250 FOR IP): Performed by: INTERNAL MEDICINE

## 2019-03-10 RX ORDER — PROCHLORPERAZINE MALEATE 5 MG/1
TABLET ORAL
Status: COMPLETED
Start: 2019-03-10 | End: 2019-03-10

## 2019-03-10 RX ORDER — SENNA PLUS 8.6 MG/1
2 TABLET ORAL ONCE
Status: COMPLETED | OUTPATIENT
Start: 2019-03-10 | End: 2019-03-10

## 2019-03-10 RX ORDER — PANTOPRAZOLE SODIUM 40 MG/1
40 TABLET, DELAYED RELEASE ORAL
Status: DISCONTINUED | OUTPATIENT
Start: 2019-03-11 | End: 2019-03-11 | Stop reason: HOSPADM

## 2019-03-10 RX ORDER — CEFTRIAXONE 1 G/1
1 INJECTION, POWDER, FOR SOLUTION INTRAMUSCULAR; INTRAVENOUS EVERY 24 HOURS
Status: DISCONTINUED | OUTPATIENT
Start: 2019-03-10 | End: 2019-03-10

## 2019-03-10 RX ORDER — METOCLOPRAMIDE HYDROCHLORIDE 5 MG/ML
10 INJECTION INTRAMUSCULAR; INTRAVENOUS 3 TIMES DAILY
Status: DISCONTINUED | OUTPATIENT
Start: 2019-03-10 | End: 2019-03-10 | Stop reason: ALTCHOICE

## 2019-03-10 RX ORDER — CEFDINIR 300 MG/1
300 CAPSULE ORAL EVERY 12 HOURS SCHEDULED
Status: DISCONTINUED | OUTPATIENT
Start: 2019-03-10 | End: 2019-03-11

## 2019-03-10 RX ORDER — SIMETHICONE 80 MG
160 TABLET,CHEWABLE ORAL ONCE
Status: COMPLETED | OUTPATIENT
Start: 2019-03-10 | End: 2019-03-10

## 2019-03-10 RX ORDER — METOCLOPRAMIDE 10 MG/1
10 TABLET ORAL
Status: DISCONTINUED | OUTPATIENT
Start: 2019-03-10 | End: 2019-03-11 | Stop reason: HOSPADM

## 2019-03-10 RX ORDER — POLYETHYLENE GLYCOL 3350 17 G/17G
17 POWDER, FOR SOLUTION ORAL ONCE
Status: COMPLETED | OUTPATIENT
Start: 2019-03-10 | End: 2019-03-10

## 2019-03-10 RX ADMIN — METOCLOPRAMIDE 5 MG: 5 INJECTION, SOLUTION INTRAMUSCULAR; INTRAVENOUS at 12:20

## 2019-03-10 RX ADMIN — Medication 10 MG: at 09:19

## 2019-03-10 RX ADMIN — SENNOSIDES 17.2 MG: 8.6 TABLET, FILM COATED ORAL at 11:43

## 2019-03-10 RX ADMIN — PROCHLORPERAZINE MALEATE 10 MG: 5 TABLET, FILM COATED ORAL at 09:19

## 2019-03-10 RX ADMIN — EXEMESTANE 25 MG: 25 TABLET ORAL at 09:20

## 2019-03-10 RX ADMIN — METOCLOPRAMIDE 5 MG: 5 INJECTION, SOLUTION INTRAMUSCULAR; INTRAVENOUS at 09:18

## 2019-03-10 RX ADMIN — ALPRAZOLAM 0.38 MG: 0.25 TABLET ORAL at 21:44

## 2019-03-10 RX ADMIN — LIDOCAINE HYDROCHLORIDE 5 ML: 20 SOLUTION ORAL; TOPICAL at 09:18

## 2019-03-10 RX ADMIN — SIMETHICONE CHEW TAB 80 MG 160 MG: 80 TABLET ORAL at 11:43

## 2019-03-10 RX ADMIN — ALPRAZOLAM 0.12 MG: 0.25 TABLET ORAL at 11:43

## 2019-03-10 RX ADMIN — Medication 10 ML: at 09:20

## 2019-03-10 RX ADMIN — ASPIRIN 81 MG 81 MG: 81 TABLET ORAL at 09:20

## 2019-03-10 RX ADMIN — PREDNISONE 5 MG: 10 TABLET ORAL at 09:20

## 2019-03-10 RX ADMIN — HYDROCORTISONE: 10 CREAM TOPICAL at 09:18

## 2019-03-10 RX ADMIN — PANTOPRAZOLE SODIUM 40 MG: 40 INJECTION, POWDER, FOR SOLUTION INTRAVENOUS at 09:19

## 2019-03-10 RX ADMIN — FLUCONAZOLE 200 MG: 100 TABLET ORAL at 09:20

## 2019-03-10 RX ADMIN — Medication 10 ML: at 12:21

## 2019-03-10 RX ADMIN — RIVAROXABAN 15 MG: 15 TABLET, FILM COATED ORAL at 09:19

## 2019-03-10 RX ADMIN — HYDROCORTISONE: 10 CREAM TOPICAL at 21:44

## 2019-03-10 RX ADMIN — POLYETHYLENE GLYCOL 3350 17 G: 17 POWDER, FOR SOLUTION ORAL at 11:43

## 2019-03-10 RX ADMIN — RIVAROXABAN 15 MG: 15 TABLET, FILM COATED ORAL at 22:00

## 2019-03-10 RX ADMIN — PROPRANOLOL HYDROCHLORIDE 10 MG: 10 TABLET ORAL at 21:44

## 2019-03-10 RX ADMIN — METOCLOPRAMIDE 10 MG: 10 TABLET ORAL at 21:44

## 2019-03-10 ASSESSMENT — PAIN SCALES - GENERAL: PAINLEVEL_OUTOF10: 0

## 2019-03-11 VITALS
RESPIRATION RATE: 16 BRPM | TEMPERATURE: 97.8 F | OXYGEN SATURATION: 90 % | SYSTOLIC BLOOD PRESSURE: 169 MMHG | HEART RATE: 63 BPM | WEIGHT: 120 LBS | HEIGHT: 64 IN | DIASTOLIC BLOOD PRESSURE: 78 MMHG | BODY MASS INDEX: 20.49 KG/M2

## 2019-03-11 LAB
CA 125: 226 U/ML
CA 19-9: 16 U/ML (ref 0–35)
CARCINOEMBRYONIC ANTIGEN: 3.1 NG/ML
DATE, STOOL #1: NORMAL
DATE, STOOL #2: NORMAL
DATE, STOOL #3: NORMAL
HEMOCCULT SP1 STL QL: NEGATIVE
HEMOCCULT SP2 STL QL: NORMAL
HEMOCCULT SP3 STL QL: NORMAL
TIME, STOOL #1: 740
TIME, STOOL #2: NORMAL
TIME, STOOL #3: NORMAL
TROPONIN INTERP: ABNORMAL
TROPONIN T: ABNORMAL NG/ML
TROPONIN, HIGH SENSITIVITY: 20 NG/L (ref 0–14)

## 2019-03-11 PROCEDURE — 99239 HOSP IP/OBS DSCHRG MGMT >30: CPT | Performed by: CLINICAL NURSE SPECIALIST

## 2019-03-11 PROCEDURE — 36415 COLL VENOUS BLD VENIPUNCTURE: CPT

## 2019-03-11 PROCEDURE — 6370000000 HC RX 637 (ALT 250 FOR IP): Performed by: NURSE PRACTITIONER

## 2019-03-11 PROCEDURE — 6370000000 HC RX 637 (ALT 250 FOR IP): Performed by: CLINICAL NURSE SPECIALIST

## 2019-03-11 PROCEDURE — 93005 ELECTROCARDIOGRAM TRACING: CPT

## 2019-03-11 PROCEDURE — 97166 OT EVAL MOD COMPLEX 45 MIN: CPT

## 2019-03-11 PROCEDURE — 6370000000 HC RX 637 (ALT 250 FOR IP)

## 2019-03-11 PROCEDURE — 97535 SELF CARE MNGMENT TRAINING: CPT

## 2019-03-11 PROCEDURE — 84484 ASSAY OF TROPONIN QUANT: CPT

## 2019-03-11 PROCEDURE — 6370000000 HC RX 637 (ALT 250 FOR IP): Performed by: FAMILY MEDICINE

## 2019-03-11 PROCEDURE — 82272 OCCULT BLD FECES 1-3 TESTS: CPT

## 2019-03-11 PROCEDURE — 97530 THERAPEUTIC ACTIVITIES: CPT

## 2019-03-11 PROCEDURE — 97110 THERAPEUTIC EXERCISES: CPT

## 2019-03-11 PROCEDURE — 97116 GAIT TRAINING THERAPY: CPT

## 2019-03-11 RX ORDER — UREA 10 %
3 LOTION (ML) TOPICAL NIGHTLY PRN
Qty: 30 TABLET | Refills: 0 | Status: ON HOLD | COMMUNITY
Start: 2019-03-11 | End: 2019-11-18

## 2019-03-11 RX ORDER — LEVOFLOXACIN 500 MG/1
250 TABLET, FILM COATED ORAL DAILY
Status: DISCONTINUED | OUTPATIENT
Start: 2019-03-11 | End: 2019-03-11 | Stop reason: HOSPADM

## 2019-03-11 RX ORDER — PANTOPRAZOLE SODIUM 40 MG/1
40 TABLET, DELAYED RELEASE ORAL
Qty: 30 TABLET | Refills: 0 | Status: SHIPPED | OUTPATIENT
Start: 2019-03-12

## 2019-03-11 RX ORDER — DIAPER,BRIEF,INFANT-TODD,DISP
EACH MISCELLANEOUS
Qty: 1 TUBE | Refills: 1 | Status: SHIPPED | OUTPATIENT
Start: 2019-03-11 | End: 2019-03-18

## 2019-03-11 RX ORDER — SUCRALFATE 1 G/1
1 TABLET ORAL EVERY 6 HOURS SCHEDULED
Status: DISCONTINUED | OUTPATIENT
Start: 2019-03-11 | End: 2019-03-11 | Stop reason: HOSPADM

## 2019-03-11 RX ORDER — SUCRALFATE 1 G/1
TABLET ORAL
Status: COMPLETED
Start: 2019-03-11 | End: 2019-03-11

## 2019-03-11 RX ORDER — LEVOFLOXACIN 250 MG/1
250 TABLET ORAL DAILY
Qty: 7 TABLET | Refills: 0 | Status: SHIPPED | OUTPATIENT
Start: 2019-03-12 | End: 2019-03-22

## 2019-03-11 RX ORDER — SUCRALFATE ORAL 1 G/10ML
1 SUSPENSION ORAL 4 TIMES DAILY
Qty: 1200 ML | Refills: 3 | Status: ON HOLD | OUTPATIENT
Start: 2019-03-11 | End: 2019-09-05

## 2019-03-11 RX ORDER — HYDROCODONE BITARTRATE AND ACETAMINOPHEN 5; 325 MG/1; MG/1
2 TABLET ORAL EVERY 6 HOURS PRN
Qty: 20 TABLET | Refills: 0 | Status: SHIPPED | OUTPATIENT
Start: 2019-03-11 | End: 2019-03-14

## 2019-03-11 RX ADMIN — RIVAROXABAN 15 MG: 15 TABLET, FILM COATED ORAL at 09:33

## 2019-03-11 RX ADMIN — METOCLOPRAMIDE 10 MG: 10 TABLET ORAL at 12:31

## 2019-03-11 RX ADMIN — ALPRAZOLAM 0.12 MG: 0.25 TABLET ORAL at 12:30

## 2019-03-11 RX ADMIN — PANTOPRAZOLE SODIUM 40 MG: 40 TABLET, DELAYED RELEASE ORAL at 09:34

## 2019-03-11 RX ADMIN — HYDROCODONE BITARTRATE AND ACETAMINOPHEN 2 TABLET: 5; 325 TABLET ORAL at 09:32

## 2019-03-11 RX ADMIN — HYDROCORTISONE: 10 CREAM TOPICAL at 09:37

## 2019-03-11 RX ADMIN — SUCRALFATE 1 G: 1 TABLET ORAL at 16:22

## 2019-03-11 RX ADMIN — METOCLOPRAMIDE 10 MG: 10 TABLET ORAL at 16:52

## 2019-03-11 RX ADMIN — HYDROCODONE BITARTRATE AND ACETAMINOPHEN 1 TABLET: 5; 325 TABLET ORAL at 01:29

## 2019-03-11 RX ADMIN — CEFDINIR 300 MG: 300 CAPSULE ORAL at 09:38

## 2019-03-11 RX ADMIN — METOCLOPRAMIDE 10 MG: 10 TABLET ORAL at 09:33

## 2019-03-11 RX ADMIN — EXEMESTANE 25 MG: 25 TABLET ORAL at 09:36

## 2019-03-11 RX ADMIN — RIVAROXABAN 15 MG: 15 TABLET, FILM COATED ORAL at 16:52

## 2019-03-11 RX ADMIN — LEVOFLOXACIN 250 MG: 500 TABLET, FILM COATED ORAL at 12:31

## 2019-03-11 RX ADMIN — PREDNISONE 5 MG: 10 TABLET ORAL at 09:34

## 2019-03-11 ASSESSMENT — PAIN DESCRIPTION - PAIN TYPE: TYPE: ACUTE PAIN

## 2019-03-11 ASSESSMENT — PAIN DESCRIPTION - LOCATION: LOCATION: CHEST

## 2019-03-11 ASSESSMENT — PAIN DESCRIPTION - ONSET: ONSET: SUDDEN

## 2019-03-11 ASSESSMENT — PAIN SCALES - GENERAL
PAINLEVEL_OUTOF10: 0
PAINLEVEL_OUTOF10: 8
PAINLEVEL_OUTOF10: 5
PAINLEVEL_OUTOF10: 6
PAINLEVEL_OUTOF10: 6

## 2019-03-12 LAB
CA 15-3: 397 U/ML (ref 0–31)
CA 27-29: 442 U/ML (ref 0–38)
EKG ATRIAL RATE: 300 BPM
EKG P AXIS: 59 DEGREES
EKG Q-T INTERVAL: 418 MS
EKG QRS DURATION: 118 MS
EKG QTC CALCULATION (BAZETT): 427 MS
EKG R AXIS: -19 DEGREES
EKG T AXIS: -2 DEGREES
EKG VENTRICULAR RATE: 63 BPM

## 2019-03-14 ENCOUNTER — TELEPHONE (OUTPATIENT)
Dept: RADIATION ONCOLOGY | Age: 84
End: 2019-03-14

## 2019-03-28 ENCOUNTER — TELEPHONE (OUTPATIENT)
Dept: RADIATION ONCOLOGY | Age: 84
End: 2019-03-28

## 2019-04-01 ENCOUNTER — HOSPITAL ENCOUNTER (OUTPATIENT)
Age: 84
Discharge: HOME | End: 2019-04-01
Payer: MEDICARE

## 2019-04-01 ENCOUNTER — HOSPITAL ENCOUNTER (OUTPATIENT)
Age: 84
Discharge: HOME | End: 2019-04-03
Payer: MEDICARE

## 2019-04-01 ENCOUNTER — HOSPITAL ENCOUNTER (OUTPATIENT)
Dept: GENERAL RADIOLOGY | Age: 84
Discharge: HOME | End: 2019-04-03
Payer: MEDICARE

## 2019-04-01 DIAGNOSIS — C79.51 SECONDARY ADENOCARCINOMA OF BONE (HCC): ICD-10-CM

## 2019-04-01 DIAGNOSIS — C50.911 INFILTRATING DUCTAL CARCINOMA OF RIGHT BREAST (HCC): ICD-10-CM

## 2019-04-01 LAB
ALBUMIN SERPL-MCNC: 4.2 G/DL (ref 3.5–5.2)
ALBUMIN/GLOBULIN RATIO: 1.7 (ref 1–2.5)
ALP BLD-CCNC: 41 U/L (ref 35–104)
ALT SERPL-CCNC: 13 U/L (ref 5–33)
ANION GAP SERPL CALCULATED.3IONS-SCNC: 12 MMOL/L (ref 9–17)
AST SERPL-CCNC: 20 U/L
BILIRUB SERPL-MCNC: 0.29 MG/DL (ref 0.3–1.2)
BUN BLDV-MCNC: 15 MG/DL (ref 8–23)
BUN/CREAT BLD: ABNORMAL (ref 9–20)
CALCIUM SERPL-MCNC: 8.8 MG/DL (ref 8.6–10.4)
CHLORIDE BLD-SCNC: 96 MMOL/L (ref 98–107)
CO2: 28 MMOL/L (ref 20–31)
CREAT SERPL-MCNC: 0.85 MG/DL (ref 0.5–0.9)
GFR AFRICAN AMERICAN: >60 ML/MIN
GFR NON-AFRICAN AMERICAN: >60 ML/MIN
GFR SERPL CREATININE-BSD FRML MDRD: ABNORMAL ML/MIN/{1.73_M2}
GFR SERPL CREATININE-BSD FRML MDRD: ABNORMAL ML/MIN/{1.73_M2}
GLUCOSE BLD-MCNC: 101 MG/DL (ref 70–99)
POTASSIUM SERPL-SCNC: 4.1 MMOL/L (ref 3.7–5.3)
SODIUM BLD-SCNC: 136 MMOL/L (ref 135–144)
TOTAL PROTEIN: 6.7 G/DL (ref 6.4–8.3)

## 2019-04-01 PROCEDURE — 85025 COMPLETE CBC W/AUTO DIFF WBC: CPT

## 2019-04-01 PROCEDURE — 80053 COMPREHEN METABOLIC PANEL: CPT

## 2019-04-01 PROCEDURE — 72100 X-RAY EXAM L-S SPINE 2/3 VWS: CPT

## 2019-04-01 PROCEDURE — 72072 X-RAY EXAM THORAC SPINE 3VWS: CPT

## 2019-04-01 PROCEDURE — 36415 COLL VENOUS BLD VENIPUNCTURE: CPT

## 2019-04-02 LAB
ABSOLUTE EOS #: 0 K/UL (ref 0–0.4)
ABSOLUTE IMMATURE GRANULOCYTE: 0 K/UL (ref 0–0.3)
ABSOLUTE LYMPH #: 0.3 K/UL (ref 1–4.8)
ABSOLUTE MONO #: 0.13 K/UL (ref 0.1–0.8)
BASOPHILS # BLD: 0 % (ref 0–2)
BASOPHILS ABSOLUTE: 0 K/UL (ref 0–0.2)
DIFFERENTIAL TYPE: ABNORMAL
EOSINOPHILS RELATIVE PERCENT: 0 % (ref 1–4)
HCT VFR BLD CALC: 35.7 % (ref 36.3–47.1)
HEMOGLOBIN: 11.7 G/DL (ref 11.9–15.1)
IMMATURE GRANULOCYTES: 0 %
LYMPHOCYTES # BLD: 9 % (ref 24–44)
MCH RBC QN AUTO: 40.5 PG (ref 25.2–33.5)
MCHC RBC AUTO-ENTMCNC: 32.8 G/DL (ref 28.4–34.8)
MCV RBC AUTO: 123.5 FL (ref 82.6–102.9)
MONOCYTES # BLD: 4 % (ref 1–7)
MORPHOLOGY: ABNORMAL
MORPHOLOGY: ABNORMAL
NRBC AUTOMATED: 0 PER 100 WBC
PDW BLD-RTO: 17.6 % (ref 11.8–14.4)
PLATELET # BLD: 334 K/UL (ref 138–453)
PLATELET ESTIMATE: ABNORMAL
PMV BLD AUTO: 9 FL (ref 8.1–13.5)
RBC # BLD: 2.89 M/UL (ref 3.95–5.11)
RBC # BLD: ABNORMAL 10*6/UL
SEG NEUTROPHILS: 87 % (ref 36–66)
SEGMENTED NEUTROPHILS ABSOLUTE COUNT: 2.87 K/UL (ref 1.8–7.7)
WBC # BLD: 3.3 K/UL (ref 3.5–11.3)
WBC # BLD: ABNORMAL 10*3/UL

## 2019-04-04 ENCOUNTER — HOSPITAL ENCOUNTER (OUTPATIENT)
Dept: RADIATION ONCOLOGY | Age: 84
Discharge: HOME | End: 2019-04-04
Attending: RADIOLOGY
Payer: MEDICARE

## 2019-04-04 VITALS
SYSTOLIC BLOOD PRESSURE: 112 MMHG | WEIGHT: 121 LBS | OXYGEN SATURATION: 98 % | TEMPERATURE: 97.5 F | HEART RATE: 68 BPM | BODY MASS INDEX: 20.77 KG/M2 | DIASTOLIC BLOOD PRESSURE: 68 MMHG | RESPIRATION RATE: 18 BRPM

## 2019-04-04 DIAGNOSIS — C50.911 INFILTRATING DUCTAL CARCINOMA OF RIGHT BREAST (HCC): Primary | ICD-10-CM

## 2019-04-04 PROCEDURE — 99212 OFFICE O/P EST SF 10 MIN: CPT | Performed by: RADIOLOGY

## 2019-04-04 NOTE — PROGRESS NOTES
Barb Tanner  4/4/2019  3:41 PM      Vitals:    04/04/19 1527   BP: 112/68   Pulse: 68   Resp: 18   Temp: 97.5 °F (36.4 °C)   SpO2: 98%    :     Wt Readings from Last 1 Encounters:   04/04/19 121 lb (54.9 kg)                Current Outpatient Medications:     apixaban (ELIQUIS STARTER PACK) 5 MG TABS tablet, Take 10 mg (2 tablets) orally twice daily for 7 days, then take 5 mg (1 tablet) orally twice daily thereafter., Disp: 74 tablet, Rfl: 0    silver sulfADIAZINE (SILVADENE) 1 % cream, Apply topically daily to reddened area on back., Disp: 20 g, Rfl: 0    melatonin 1 MG tablet, Take 3 tablets by mouth nightly as needed for Sleep, Disp: 30 tablet, Rfl: 0    pantoprazole (PROTONIX) 40 MG tablet, Take 1 tablet by mouth every morning (before breakfast), Disp: 30 tablet, Rfl: 0    sucralfate (CARAFATE) 1 GM/10ML suspension, Take 10 mLs by mouth 4 times daily, Disp: 1200 mL, Rfl: 3    loperamide (IMODIUM) 2 MG capsule, Take 2 mg by mouth nightly, Disp: , Rfl:     prochlorperazine (COMPAZINE) 5 MG tablet, Take 5 mg by mouth 2 times daily, Disp: , Rfl:     exemestane (AROMASIN) 25 MG tablet, TK 1 T PO QD, Disp: , Rfl: 5    ALPRAZolam (XANAX) 0.25 MG tablet, TK 1 T PO TID PRN FOR ANXIETY, Disp: , Rfl: 5    palbociclib (IBRANCE) 125 MG capsule, Take 125 mg by mouth daily, Disp: , Rfl:     predniSONE (DELTASONE) 10 MG tablet, Take 5 mg by mouth daily , Disp: , Rfl:     propranolol (INDERAL) 10 MG tablet, Take 10 mg by mouth daily, Disp: , Rfl:     Immunizations:    Influenza status:    [x]   Current   []   Patient declined    Pneumococcal status:  [x]   Current  []   Patient declined    Smoking Status:    [] Smoker - PPD:   [] Nonsmoker - Quit Date:               [x] Never a smoker           Cancer Screening:  Colonoscopy [] Current [] Not current   [] Not current, but scheduled   [] NA  Mammogram [] Current [] Not current   [] Not current, but scheduled   [] NA  Prostate [] Current [] Not current   [] Not current, but scheduled   [] NA  PAP/Pelvic [] Current [] Not current   [] Not current, but scheduled   [] NA  Skin  [] Current  [] Not current   [] Not current, but scheduled   [] NA    Hormone Injection:  Lupron []   Last dose given:           Next dose due:   Eligard []   Last dose given:           Next dose due:   N/A:  [x]         FALLS RISK SCREEN  Instructions:  Assess the patient and enter the appropriate indicators that are present for fall risk identification. Total the numbers entered and assign a fall risk score from Table 2.  Reassess patient at a minimum every 12 weeks or with status change. Assessment   Date  4/4/2019   1. Mental Ability: confusion/cognitively impaired 0(3)   2. Elimination Issues: incontinence, frequency 0(3)   3. Ambulatory: use of assistive devices (walker, cane, off-loading devices),        attached to equipment (IV pole, oxygen) 0 (2)   4.  Sensory Limitations: dizziness, vertigo, impaired vision 0 (3)   5. Age less than 65                 0 (0)   6. Age 72 or greater 0(1)   7. Medication: diuretics, strong analgesics, hypnotics, sedatives,        antihypertensive agents 0 (3)   8. Falls:  recent history of falls within the last 3 months (not to include slipping or        tripping) 0 (7)   TOTAL 0  If score of 4 or greater was education given? No         TABLE 2   Risk Score Risk Level Plan of Care   0-3 Little or  No Risk 1. Provide assistance as indicated for ambulation activities  2. Reorient confused/cognitively impaired patient  3. Chair/bed in low position, stretcher/bed with siderails up except when performing patient care activities  5. Educate patient/family/caregiver on falls prevention  6.  Reassess in 12 weeks or with any noted change in patient condition which places them at a risk for a fall   4-6 Moderate Risk 1. Provide assistance as indicated for ambulation activities  2. Reorient confused/cognitively impaired patient  3.   Chair/bed in low position, stretcher/bed with siderails up except when performing patient care activities  4. Educate patient/family/caregiver on falls prevention     7 or   Higher High Risk 1. Place patient in easily observable treatment room  2. Patient attended at all times by family member or staff  3. Provide assistance as indicated for ambulation activities  4. Reorient confused/cognitively impaired patient  5. Chair/bed in low position, stretcher/bed with siderails up except when performing patient care activities  6. Educate patient/family/caregiver on falls prevention         PLAN: Patient is seen today in follow up. Patient reports no pain. Dr Shahida Baron notified and examined pt.  Pt to f/u with SHERRIE Zabala

## 2019-04-04 NOTE — PROGRESS NOTES
Bao Maya M.D. Feliberto Huerta. Anselmo Willingham, Ph.D., M.D., Fred Wills M.D. Alpa Oconnor, Ph.D., M.STEPHAN. Sachi Moss M.D. Date of Service: 2019    Location:  [address] [place] [address],   [address] [facility]   [phone]     RADIATION ONCOLOGY FOLLOW UP    Patient ID:   Any Carroll  : 1934   MRN: [id number]    DIAGNOSIS:  Cancer Staging  No matching staging information was found for the patient. INTERVAL HISTORY:   Any Carroll is a 80 y.o. female a diagnosis of a right sided breast cancer that dates back to . She presented with a lesion in the upper outer quadrant of breast and a biopsy returned positive for an invasive ductal carcinoma ER [place] positive HER-2 negative. I did time the patient elected for holistic treatments. She declined surgery, chemotherapy or radiation therapy. She and a bone scan did document uptake in multiple of the lumbar vertebra with the largest at L5. Is also uptake in the right hip, sternum, T12. An x-ray of the right hip dated document a fracture at the neck of the femur. The patient saw orthopedic surgery and had an ORIF on 2019. The pathology returned positive for adenocarcinoma consistent with a breast primary. She completed a course of adjuvant radiation therapy to the right hip and radiation therapy to T 11 through L1, encompassing the lesion at T12. The patient today is without complaints except for some discomfort in the right hip. She is on physical therapy. She has no further pains in the back. He has no other complaints.         MEDICATIONS:    Current Outpatient Medications:     apixaban (ELIQUIS STARTER PACK) 5 MG TABS tablet, Take 10 mg (2 tablets) orally twice daily for 7 days, then take 5 mg (1 tablet) orally twice daily thereafter., Disp: 74 tablet, Rfl: 0    silver sulfADIAZINE (SILVADENE) 1 % cream, Apply topically daily to reddened area on back., Disp: 20 g, Range Status   2019 16 0 - 35 U/mL Final     Comment:     The Roche \"ECLIA\" assay is used. Results obtained with different assay methods cannot be   used interchangeably. [email or web address] results found for: PSALASTLAB(GLUCOSE,BUN,CREATININE,BUNCRER,CALCIUM,NA,K,CL,CO2,ANIONGAP,ALKPHOS,ALT,AST,BILITOT,PROT,LABALBU,ALBUMIN,LABGLOM,GFRAA,GFR)@    REVIEW OF SYSTEMS:    Review of Systems    PHYSICAL EXAMINATION:  CHAPERONE: Not Required  ECO Symptomatic but completely ambulatory  VITAL SIGNS: /68   Pulse 68   Temp 97.5 °F (36.4 °C) (Oral)   Resp 18   Wt 121 lb (54.9 kg)   LMP 1984   SpO2 98%   BMI 20.77 kg/m²   Wt Readings from Last 3 Encounters:   19 121 lb (54.9 kg)   19 120 lb (54.4 kg)   19 120 lb 5.9 oz (54.6 kg)     Physical Exam  The patient is in no acute distress, oriented in time, person and place. Mucous membrane is pink, moist and nonicteric. Pupils are equal and reactive to light. Neck is supple without adenopathy. Chest is clear to auscultation without added breath sounds or wheezes. Examination of the surgical site in the right hip reveals a well-healed surgical scar. The skin is intact without erythema or hyperpigmentation. ASSESSMENT AND PLAN:  The patient is an 72-year-old female with a diagnosis of a right sided breast cancer that dates back to . I did time the patient declined surgery, chemotherapy or radiation therapy. She elected for holistic treatments. She developed pains in the right hip and in 2019 had a bone scan data documented uptake in the right hip, sternum, spine, T 12 vertebral body. An x-ray of the right hip showed a fracture of the neck of the femur. The patient proceeded with an ORIF on 2019. She then completed adjuvant radiation therapy to the T12 lesion together with a right hip. Today the patient is without complaints. She has no pains in the back. She has occasional discomfort in the right hip. She is undergoing physical therapy. The patient is receiving Ibrance under the care of her primary care physician and noted that the lesion in the breasts has decreased in size. I did not to the patient and her daughter the possibility of palliative radiation therapy to the right breast if it becomes painful, foul-smelling or bleeding. At this point the patient and her daughter again declined. I did offer the patient is 6 month follow-up appointment. He however elected to follow-up with her primary care physician would therefore be seen in radiation oncology as needed. Electronically signed by Dennys Barrow MD on 4/4/2019 at 4:17 PM  [address]      Medications Prescribed:   New Prescriptions    No medications on file       Orders: No orders of the defined types were placed in this encounter.

## 2019-04-10 ENCOUNTER — TELEPHONE (OUTPATIENT)
Dept: RADIATION ONCOLOGY | Age: 84
End: 2019-04-10

## 2019-04-23 ENCOUNTER — TELEPHONE (OUTPATIENT)
Dept: RADIATION ONCOLOGY | Age: 84
End: 2019-04-23

## 2019-05-03 ENCOUNTER — TELEPHONE (OUTPATIENT)
Dept: RADIATION ONCOLOGY | Age: 84
End: 2019-05-03

## 2019-05-03 NOTE — TELEPHONE ENCOUNTER
Intial entry not charted daughter Purvi Pinto called office and wanted MD to look over recent imaging for the pt. MD reviewed chart and would like pt to come in for a follow up appt. I called and spoke with Purvi Pinto pt daughter letting her know MD would like to see pt in office for a follow up visit. While on the phone with Purvi Pinto the other line was ringing another MD calling the office I asked Purvi Pinto if she could hold the line for one second so I could pick the phone up she states NO she can not hold and I would need to call her back at 3:30p and hung up on me. Daughter is not very nice at all to office staff anytime she calls. I will await a call back from Purvi Pinto if she would like to schedule her [facility] for a follow up visit at her convenience.

## 2019-05-10 ENCOUNTER — HOSPITAL ENCOUNTER (OUTPATIENT)
Age: 84
Discharge: HOME | End: 2019-05-10
Payer: MEDICARE

## 2019-05-10 ENCOUNTER — HOSPITAL ENCOUNTER (OUTPATIENT)
Dept: RADIATION ONCOLOGY | Age: 84
Discharge: HOME | End: 2019-05-10
Attending: RADIOLOGY
Payer: MEDICARE

## 2019-05-10 VITALS
SYSTOLIC BLOOD PRESSURE: 135 MMHG | DIASTOLIC BLOOD PRESSURE: 77 MMHG | TEMPERATURE: 97.6 F | WEIGHT: 120 LBS | HEART RATE: 59 BPM | BODY MASS INDEX: 20.6 KG/M2 | OXYGEN SATURATION: 95 % | RESPIRATION RATE: 18 BRPM

## 2019-05-10 DIAGNOSIS — C50.911 INFILTRATING DUCTAL CARCINOMA OF RIGHT BREAST (HCC): ICD-10-CM

## 2019-05-10 DIAGNOSIS — C79.51 BONE METASTASIS (HCC): Primary | ICD-10-CM

## 2019-05-10 LAB
-: ABNORMAL
AMORPHOUS: ABNORMAL
BACTERIA: ABNORMAL
BILIRUBIN URINE: NEGATIVE
CASTS UA: ABNORMAL /LPF
COLOR: ABNORMAL
COMMENT UA: ABNORMAL
CRYSTALS, UA: ABNORMAL /HPF
EPITHELIAL CELLS UA: ABNORMAL /HPF
GLUCOSE URINE: NEGATIVE
KETONES, URINE: ABNORMAL
LEUKOCYTE ESTERASE, URINE: ABNORMAL
MUCUS: ABNORMAL
NITRITE, URINE: POSITIVE
OTHER OBSERVATIONS UA: ABNORMAL
PH UA: 6
PROTEIN UA: ABNORMAL
RBC UA: ABNORMAL /HPF
RENAL EPITHELIAL, UA: ABNORMAL /HPF
SPECIFIC GRAVITY UA: 1.02
TRICHOMONAS: ABNORMAL
TURBIDITY: ABNORMAL
URINE HGB: ABNORMAL
UROBILINOGEN, URINE: NORMAL
WBC UA: ABNORMAL /HPF
YEAST: ABNORMAL

## 2019-05-10 PROCEDURE — 87088 URINE BACTERIA CULTURE: CPT

## 2019-05-10 PROCEDURE — 81001 URINALYSIS AUTO W/SCOPE: CPT

## 2019-05-10 PROCEDURE — 99212 OFFICE O/P EST SF 10 MIN: CPT | Performed by: RADIOLOGY

## 2019-05-10 PROCEDURE — 87186 SC STD MICRODIL/AGAR DIL: CPT

## 2019-05-10 PROCEDURE — 87086 URINE CULTURE/COLONY COUNT: CPT

## 2019-05-10 RX ORDER — FENTANYL 25 UG/H
1 PATCH TRANSDERMAL
Status: ON HOLD | COMMUNITY
End: 2019-11-18

## 2019-05-10 ASSESSMENT — PAIN DESCRIPTION - ORIENTATION: ORIENTATION: RIGHT

## 2019-05-10 ASSESSMENT — PAIN DESCRIPTION - LOCATION: LOCATION: HIP

## 2019-05-10 ASSESSMENT — PAIN SCALES - GENERAL: PAINLEVEL_OUTOF10: 10

## 2019-05-10 NOTE — PROGRESS NOTES
Barb Tanner  5/10/2019  2:54 PM      Vitals:    05/10/19 1452   BP: 135/77   Pulse: 59   Resp: 18   Temp: 97.6 °F (36.4 °C)   SpO2: 95%    : Wt Readings from Last 1 Encounters:   05/10/19 120 lb (54.4 kg)                Current Outpatient Medications:     fentaNYL (DURAGESIC) 25 MCG/HR, Place 1 patch onto the skin every 72 hours. , Disp: , Rfl:     apixaban (ELIQUIS STARTER PACK) 5 MG TABS tablet, Take 10 mg (2 tablets) orally twice daily for 7 days, then take 5 mg (1 tablet) orally twice daily thereafter., Disp: 74 tablet, Rfl: 0    silver sulfADIAZINE (SILVADENE) 1 % cream, Apply topically daily to reddened area on back., Disp: 20 g, Rfl: 0    melatonin 1 MG tablet, Take 3 tablets by mouth nightly as needed for Sleep, Disp: 30 tablet, Rfl: 0    pantoprazole (PROTONIX) 40 MG tablet, Take 1 tablet by mouth every morning (before breakfast), Disp: 30 tablet, Rfl: 0    sucralfate (CARAFATE) 1 GM/10ML suspension, Take 10 mLs by mouth 4 times daily, Disp: 1200 mL, Rfl: 3    loperamide (IMODIUM) 2 MG capsule, Take 2 mg by mouth nightly, Disp: , Rfl:     prochlorperazine (COMPAZINE) 5 MG tablet, Take 5 mg by mouth 2 times daily, Disp: , Rfl:     exemestane (AROMASIN) 25 MG tablet, TK 1 T PO QD, Disp: , Rfl: 5    ALPRAZolam (XANAX) 0.25 MG tablet, TK 1 T PO TID PRN FOR ANXIETY, Disp: , Rfl: 5    palbociclib (IBRANCE) 125 MG capsule, Take 125 mg by mouth daily, Disp: , Rfl:     predniSONE (DELTASONE) 10 MG tablet, Take 5 mg by mouth daily , Disp: , Rfl:     propranolol (INDERAL) 10 MG tablet, Take 10 mg by mouth daily, Disp: , Rfl:       PLAN: Patient is seen today in follow up. She reports new pain in her Right hip. Rates pain a 10/10. Dr Raulito Becker notified and examined pt.   Pt signed consent and will return for [facility]

## 2019-05-11 NOTE — PROGRESS NOTES
Edmund Álvarez M.D. Brandi Simeon. Obi Verdin, Ph.D., M.D., Kennedi Weir M.D. Dipak Helton, Ph.D., M.D. Aristeo Vernon M.D. Date of Service: 5/10/2019    Location:  [facility] [address] [place]   [phone]     RADIATION ONCOLOGY FOLLOW UP    Patient ID:   Agnes Galarza  : 1934   MRN: [id number]    DIAGNOSIS:  Cancer Staging  No matching staging information was found for the patient. INTERVAL HISTORY:   Agnes Galarza is a 80 y.o. female with a diagnosis of stage IV breast cancer with bone metastases. The patient diagnosis dates back to  when she presented with a lesion in the right breast and a biopsy returned positive for invasive ductal carcinoma ER [place] positive HER-2 negative. At that time the patient elected for holistic treatments. The patient subsequently developed pains in the right hip and back and a bone scan documented uptake in multiple lumbar vertebrae together with the right hip, sternum and T12. The patient then proceeded to have an ORIF of the right hip on 2019. Pathology returned positive for adenocarcinoma consistent with a breast primary. The patient completed adjuvant radiation therapy to the right hip and T11 through L1 on 3/6/2019. She did not resolution of the pains in the hip region. However over the last 2-3 weeks the patient noted increasing pains in the right hip which she now rates as a 10 on 10. A bone scan on 2019 did document uptake in the right acetabulum region. A an x-ray of the right hip and did document the internal fixation device to be in place. MEDICATIONS:    Current Outpatient Medications:     fentaNYL (DURAGESIC) 25 MCG/HR, Place 1 patch onto the skin every 72 hours. , Disp: , Rfl:     apixaban (ELIQUIS STARTER PACK) 5 MG TABS tablet, Take 10 mg (2 tablets) orally twice daily for 7 days, then take 5 mg (1 tablet) orally twice daily thereafter. , \"ECLIA\" assay is used. Results obtained with different assay methods cannot be   used interchangeably. CA 19-9   Date Value Ref Range Status   2019 16 0 - 35 U/mL Final     Comment:     The Roche \"ECLIA\" assay is used. Results obtained with different assay methods cannot be   used interchangeably. [email or web address] results found for: PSALASTLAB(GLUCOSE,BUN,CREATININE,BUNCRER,CALCIUM,NA,K,CL,CO2,ANIONGAP,ALKPHOS,ALT,AST,BILITOT,PROT,LABALBU,ALBUMIN,LABGLOM,GFRAA,GFR)@    REVIEW OF SYSTEMS:    Review of Systems    PHYSICAL EXAMINATION:  CHAPERONE: Not Required  ECO Symptomatic but completely ambulatory  VITAL SIGNS: /77   Pulse 59   Temp 97.6 °F (36.4 °C) (Oral)   Resp 18   Wt 120 lb (54.4 kg)   LMP 1984   SpO2 95%   BMI 20.60 kg/m²   Wt Readings from Last 3 Encounters:   05/10/19 120 lb (54.4 kg)   19 121 lb (54.9 kg)   19 120 lb (54.4 kg)     Physical Exam  The patient is in no acute distress oriented in time, person and place. Mucous membrane is pink, moist and nonicteric. Chest is clear to auscultation without added breath sounds or wheezes. Heart sounds S1 and S2 heard, regular rate and rhythm. Palpation around the hip region reveals tenderness towards the ischial tuberosity. No tenderness palpated around the hip region. ASSESSMENT AND PLAN:  The patient is an 28-year-old female with a diagnosis of stage IV breast cancer with bone metastases. The patient initially sought holistic treatment for do breast cancer. She then presented with pains in the right hip and imaging studies that documented a lesion within the right hip. She did have an ORIF followed by palliative radiation therapy on 3/6/2019. She did have resolution of the pains in the right hip. However over the last 2-3 weeks the patient pains returned and she rates it as a 10 on 10. She is pointing towards the ischial tuberosity area as the site of pains.     This area was not previously treated. I did for the patient a course of palliative radiation to include this site. I did however counseled the patient of the possibility of overlap with the prior radiation field. I did however offer the patient to proceed with a CT simulation and to treat the symptomatic area attempted to avoid overlap as much as possible. The patient did agree to proceed with a CT simulation and then to determine whether she would proceed with radiation therapy depending on the extent of overlap. An informed consent was obtained with the patient aware offered the potential for overlap within the bowel. She will however decide on the treatment after the plan is generated. Electronically signed by Laura Caldera MD on 5/10/2019 at 9:58 PM  [address]      Medications Prescribed:   New Prescriptions    No medications on file       Orders: No orders of the defined types were placed in this encounter.

## 2019-05-12 LAB
CULTURE: ABNORMAL
Lab: ABNORMAL
SPECIMEN DESCRIPTION: ABNORMAL

## 2019-05-16 ENCOUNTER — TELEPHONE (OUTPATIENT)
Dept: RADIATION ONCOLOGY | Age: 84
End: 2019-05-16

## 2019-05-16 NOTE — TELEPHONE ENCOUNTER
Pts daughter called [facility] pt's SIM appt for 5/17/19, as pt has an infusion that day now. Daughter will call back to reschedule.

## 2019-05-17 ENCOUNTER — HOSPITAL ENCOUNTER (OUTPATIENT)
Dept: INFUSION THERAPY | Age: 84
Discharge: HOME | End: 2019-05-17
Payer: MEDICARE

## 2019-05-17 ENCOUNTER — APPOINTMENT (OUTPATIENT)
Dept: RADIATION ONCOLOGY | Age: 84
End: 2019-05-17
Attending: RADIOLOGY
Payer: MEDICARE

## 2019-05-17 VITALS
RESPIRATION RATE: 18 BRPM | SYSTOLIC BLOOD PRESSURE: 136 MMHG | DIASTOLIC BLOOD PRESSURE: 71 MMHG | HEART RATE: 62 BPM | TEMPERATURE: 98 F

## 2019-05-17 DIAGNOSIS — C50.411 MALIGNANT NEOPLASM OF UPPER-OUTER QUADRANT OF RIGHT BREAST IN FEMALE, ESTROGEN RECEPTOR POSITIVE (HCC): ICD-10-CM

## 2019-05-17 DIAGNOSIS — D89.9 DISORDER INVOLVING IMMUNE MECHANISM (HCC): Primary | ICD-10-CM

## 2019-05-17 DIAGNOSIS — Z17.0 MALIGNANT NEOPLASM OF UPPER-OUTER QUADRANT OF RIGHT BREAST IN FEMALE, ESTROGEN RECEPTOR POSITIVE (HCC): ICD-10-CM

## 2019-05-17 PROCEDURE — 96366 THER/PROPH/DIAG IV INF ADDON: CPT

## 2019-05-17 PROCEDURE — 2500000003 HC RX 250 WO HCPCS: Performed by: INTERNAL MEDICINE

## 2019-05-17 PROCEDURE — 2580000003 HC RX 258: Performed by: INTERNAL MEDICINE

## 2019-05-17 PROCEDURE — 96365 THER/PROPH/DIAG IV INF INIT: CPT

## 2019-05-17 RX ORDER — SODIUM CHLORIDE 9 MG/ML
INJECTION, SOLUTION INTRAVENOUS ONCE
Status: COMPLETED | OUTPATIENT
Start: 2019-05-17 | End: 2019-05-17

## 2019-05-17 RX ORDER — SODIUM CHLORIDE 0.9 % (FLUSH) 0.9 %
10 SYRINGE (ML) INJECTION PRN
Status: CANCELLED | OUTPATIENT
Start: 2019-05-24

## 2019-05-17 RX ORDER — SODIUM CHLORIDE 9 MG/ML
INJECTION, SOLUTION INTRAVENOUS ONCE
Status: CANCELLED | OUTPATIENT
Start: 2019-05-24

## 2019-05-17 RX ADMIN — MAGNESIUM CHLORIDE: 200 INJECTION INTRAVENOUS at 13:44

## 2019-05-17 RX ADMIN — SODIUM CHLORIDE: 9 INJECTION, SOLUTION INTRAVENOUS at 13:21

## 2019-05-17 NOTE — PROGRESS NOTES
Pt here for infusion. Tolerates very well. C/o of hip pain and states she had radiation to it and it still hurts. Assistance needed to and from chair. Will return 5/24/19 for treatment.

## 2019-05-17 NOTE — FLOWSHEET NOTE
Situation:   encountered Ms. Tanner and her  and daughter, Taye Keyes, when rounding the infusion clinic. Assessment:  Ms. Aydin Gill is a 79 y/o female who has been receiving treatment for cancer. Today, Ms. Aydin Gill was joined by her  of 61 years and her daughter, Taye Keyes. Ms. Aydin Gill smiled and introduced herself. After sharing her name and parish,  realized a connection with one of her daughters from childhood. Ms. Aydin Gill identified her family (7 children, 36 grandchildren, and three great grandchildren) as what has helped her cope with having cancer and receiving treatment. Daughter described Pt's experience since she was diagnosed five years ago as a \"journey. \" Dtr named Pt's strengths, including that she is strong, does not give up, and was willing to research to find the best treatment options for her. Dtr affirmed Pt and Spouse and expressed gratitude for how she was raised. Dtr shared that someone from the family stays overnight and into the morning with Pt and Spouse. Ms. Aydin Gill smiled and expressed gratitude for the support.  accessed his humor, when sharing how he and Pt met, joking about their combined ethnic heritages as being [place]. \" Pt agreed that she does not give up and that she is not going through this alone. She also agreed with daughter's observation that her iraj helps her. She admitted that this past year has been challenging and described the present time as a \"downward slope. \" Dtr offered words of encouragement to Pt about her current struggles. Intervention:   provided supportive presence and explored how Pt has been coping.  connected with Pt about her daughter and the parish she attends.  explored Pt's sources of strength and personal strengths.  offered words of encouragement and affirmation to Pt.     Outcome:  Ms. Aydin Gill acknowledged 's words of encouragement and told her that her prayers would be appreciated. She and family expressed thanks. 05/17/19 1450   Encounter Summary   Services provided to: Patient and family together   Referral/Consult From: [address] [place] [address] Drive; Family members; Worship/iraj community; Children   Place of [address] [place] [address] [facility] [place]   (5/17/19)   Complexity of Encounter Low   Length of Encounter 30 minutes   Spiritual Assessment Completed Yes   Spiritual/Mosque   Type Spiritual support   Assessment Calm; Approachable; Hopeful;Coping   Intervention Active listening;Explored feelings, thoughts, concerns;Explored coping resources;Prayer;Sustaining presence/ Ministry of presence; Discussed meaning/purpose   Outcome Expressed gratitude;Engaged in conversation;Coping; Hopeful;Receptive     Electronically signed by Ino Collins, Oncology [facility]patient [facility], [address] [facility] Radiation Oncology  5/17/2019  2:53 PM

## 2019-05-24 ENCOUNTER — HOSPITAL ENCOUNTER (OUTPATIENT)
Dept: INFUSION THERAPY | Age: 84
Discharge: HOME | End: 2019-05-24
Payer: MEDICARE

## 2019-05-24 VITALS
TEMPERATURE: 97.7 F | SYSTOLIC BLOOD PRESSURE: 127 MMHG | HEART RATE: 69 BPM | RESPIRATION RATE: 16 BRPM | DIASTOLIC BLOOD PRESSURE: 71 MMHG

## 2019-05-24 DIAGNOSIS — D89.9 DISORDER INVOLVING IMMUNE MECHANISM (HCC): Primary | ICD-10-CM

## 2019-05-24 DIAGNOSIS — C50.411 MALIGNANT NEOPLASM OF UPPER-OUTER QUADRANT OF RIGHT BREAST IN FEMALE, ESTROGEN RECEPTOR POSITIVE (HCC): ICD-10-CM

## 2019-05-24 DIAGNOSIS — Z17.0 MALIGNANT NEOPLASM OF UPPER-OUTER QUADRANT OF RIGHT BREAST IN FEMALE, ESTROGEN RECEPTOR POSITIVE (HCC): ICD-10-CM

## 2019-05-24 PROCEDURE — 96365 THER/PROPH/DIAG IV INF INIT: CPT

## 2019-05-24 PROCEDURE — 2580000003 HC RX 258: Performed by: INTERNAL MEDICINE

## 2019-05-24 PROCEDURE — 2500000003 HC RX 250 WO HCPCS: Performed by: INTERNAL MEDICINE

## 2019-05-24 PROCEDURE — 96366 THER/PROPH/DIAG IV INF ADDON: CPT

## 2019-05-24 RX ORDER — SODIUM CHLORIDE 0.9 % (FLUSH) 0.9 %
10 SYRINGE (ML) INJECTION PRN
Status: CANCELLED | OUTPATIENT
Start: 2019-05-31

## 2019-05-24 RX ORDER — SODIUM CHLORIDE 9 MG/ML
INJECTION, SOLUTION INTRAVENOUS ONCE
Status: COMPLETED | OUTPATIENT
Start: 2019-05-24 | End: 2019-05-24

## 2019-05-24 RX ORDER — SODIUM CHLORIDE 9 MG/ML
INJECTION, SOLUTION INTRAVENOUS ONCE
Status: CANCELLED | OUTPATIENT
Start: 2019-05-31

## 2019-05-24 RX ADMIN — SODIUM CHLORIDE: 9 INJECTION, SOLUTION INTRAVENOUS at 13:13

## 2019-05-24 RX ADMIN — MAGNESIUM CHLORIDE: 200 INJECTION INTRAVENOUS at 13:44

## 2019-05-31 ENCOUNTER — HOSPITAL ENCOUNTER (OUTPATIENT)
Dept: INFUSION THERAPY | Age: 84
Discharge: HOME | End: 2019-05-31
Payer: MEDICARE

## 2019-05-31 VITALS
SYSTOLIC BLOOD PRESSURE: 125 MMHG | DIASTOLIC BLOOD PRESSURE: 73 MMHG | TEMPERATURE: 97.8 F | RESPIRATION RATE: 16 BRPM | HEART RATE: 61 BPM

## 2019-05-31 DIAGNOSIS — Z17.0 MALIGNANT NEOPLASM OF UPPER-OUTER QUADRANT OF RIGHT BREAST IN FEMALE, ESTROGEN RECEPTOR POSITIVE (HCC): ICD-10-CM

## 2019-05-31 DIAGNOSIS — C50.411 MALIGNANT NEOPLASM OF UPPER-OUTER QUADRANT OF RIGHT BREAST IN FEMALE, ESTROGEN RECEPTOR POSITIVE (HCC): ICD-10-CM

## 2019-05-31 DIAGNOSIS — D89.9 DISORDER INVOLVING IMMUNE MECHANISM (HCC): Primary | ICD-10-CM

## 2019-05-31 PROCEDURE — 96366 THER/PROPH/DIAG IV INF ADDON: CPT

## 2019-05-31 PROCEDURE — 2500000003 HC RX 250 WO HCPCS: Performed by: INTERNAL MEDICINE

## 2019-05-31 PROCEDURE — 96365 THER/PROPH/DIAG IV INF INIT: CPT

## 2019-05-31 PROCEDURE — 2580000003 HC RX 258: Performed by: INTERNAL MEDICINE

## 2019-05-31 RX ORDER — SODIUM CHLORIDE 9 MG/ML
INJECTION, SOLUTION INTRAVENOUS ONCE
Status: CANCELLED | OUTPATIENT
Start: 2019-06-07

## 2019-05-31 RX ORDER — SODIUM CHLORIDE 0.9 % (FLUSH) 0.9 %
10 SYRINGE (ML) INJECTION PRN
Status: CANCELLED | OUTPATIENT
Start: 2019-06-07

## 2019-05-31 RX ORDER — SODIUM CHLORIDE 9 MG/ML
INJECTION, SOLUTION INTRAVENOUS ONCE
Status: COMPLETED | OUTPATIENT
Start: 2019-05-31 | End: 2019-05-31

## 2019-05-31 RX ADMIN — MAGNESIUM CHLORIDE: 200 INJECTION INTRAVENOUS at 14:36

## 2019-05-31 RX ADMIN — SODIUM CHLORIDE: 9 INJECTION, SOLUTION INTRAVENOUS at 14:20

## 2019-06-07 ENCOUNTER — HOSPITAL ENCOUNTER (OUTPATIENT)
Dept: INFUSION THERAPY | Age: 84
Discharge: HOME | End: 2019-06-07
Payer: MEDICARE

## 2019-06-07 VITALS
TEMPERATURE: 98.3 F | DIASTOLIC BLOOD PRESSURE: 78 MMHG | SYSTOLIC BLOOD PRESSURE: 132 MMHG | HEART RATE: 68 BPM | RESPIRATION RATE: 16 BRPM

## 2019-06-07 DIAGNOSIS — C50.411 MALIGNANT NEOPLASM OF UPPER-OUTER QUADRANT OF RIGHT BREAST IN FEMALE, ESTROGEN RECEPTOR POSITIVE (HCC): ICD-10-CM

## 2019-06-07 DIAGNOSIS — D89.9 DISORDER INVOLVING IMMUNE MECHANISM (HCC): Primary | ICD-10-CM

## 2019-06-07 DIAGNOSIS — Z17.0 MALIGNANT NEOPLASM OF UPPER-OUTER QUADRANT OF RIGHT BREAST IN FEMALE, ESTROGEN RECEPTOR POSITIVE (HCC): ICD-10-CM

## 2019-06-07 PROCEDURE — 2500000003 HC RX 250 WO HCPCS: Performed by: INTERNAL MEDICINE

## 2019-06-07 PROCEDURE — 96365 THER/PROPH/DIAG IV INF INIT: CPT

## 2019-06-07 PROCEDURE — 96366 THER/PROPH/DIAG IV INF ADDON: CPT

## 2019-06-07 RX ORDER — SODIUM CHLORIDE 9 MG/ML
INJECTION, SOLUTION INTRAVENOUS ONCE
Status: CANCELLED | OUTPATIENT
Start: 2019-06-14

## 2019-06-07 RX ORDER — SODIUM CHLORIDE 9 MG/ML
INJECTION, SOLUTION INTRAVENOUS ONCE
Status: DISCONTINUED | OUTPATIENT
Start: 2019-06-07 | End: 2019-06-08 | Stop reason: HOSPADM

## 2019-06-07 RX ORDER — SODIUM CHLORIDE 0.9 % (FLUSH) 0.9 %
10 SYRINGE (ML) INJECTION PRN
Status: CANCELLED | OUTPATIENT
Start: 2019-06-14

## 2019-06-07 RX ADMIN — MAGNESIUM CHLORIDE: 200 INJECTION INTRAVENOUS at 13:49

## 2019-06-07 NOTE — PROGRESS NOTES
Pt here for IV vitamin C and Mag infusion. Labs drawn and reviewed. Pt was treated without incident and discharged in stable condition. Pt will return on 6/12 for Vit C and Mag.

## 2019-06-12 ENCOUNTER — HOSPITAL ENCOUNTER (OUTPATIENT)
Dept: INFUSION THERAPY | Age: 84
Discharge: HOME | End: 2019-06-12
Payer: MEDICARE

## 2019-06-12 VITALS
DIASTOLIC BLOOD PRESSURE: 75 MMHG | SYSTOLIC BLOOD PRESSURE: 133 MMHG | RESPIRATION RATE: 16 BRPM | TEMPERATURE: 97.8 F | HEART RATE: 75 BPM

## 2019-06-12 DIAGNOSIS — D89.9 DISORDER INVOLVING IMMUNE MECHANISM (HCC): Primary | ICD-10-CM

## 2019-06-12 DIAGNOSIS — Z17.0 MALIGNANT NEOPLASM OF UPPER-OUTER QUADRANT OF RIGHT BREAST IN FEMALE, ESTROGEN RECEPTOR POSITIVE (HCC): ICD-10-CM

## 2019-06-12 DIAGNOSIS — C50.411 MALIGNANT NEOPLASM OF UPPER-OUTER QUADRANT OF RIGHT BREAST IN FEMALE, ESTROGEN RECEPTOR POSITIVE (HCC): ICD-10-CM

## 2019-06-12 PROCEDURE — 96365 THER/PROPH/DIAG IV INF INIT: CPT

## 2019-06-12 PROCEDURE — 2500000003 HC RX 250 WO HCPCS: Performed by: INTERNAL MEDICINE

## 2019-06-12 PROCEDURE — 96366 THER/PROPH/DIAG IV INF ADDON: CPT

## 2019-06-12 RX ORDER — SODIUM CHLORIDE 9 MG/ML
INJECTION, SOLUTION INTRAVENOUS ONCE
Status: CANCELLED | OUTPATIENT
Start: 2019-06-14

## 2019-06-12 RX ORDER — SODIUM CHLORIDE 9 MG/ML
INJECTION, SOLUTION INTRAVENOUS ONCE
Status: DISCONTINUED | OUTPATIENT
Start: 2019-06-12 | End: 2019-06-13 | Stop reason: HOSPADM

## 2019-06-12 RX ORDER — SODIUM CHLORIDE 0.9 % (FLUSH) 0.9 %
10 SYRINGE (ML) INJECTION PRN
Status: CANCELLED | OUTPATIENT
Start: 2019-06-14

## 2019-06-12 RX ADMIN — MAGNESIUM CHLORIDE: 200 INJECTION INTRAVENOUS at 14:53

## 2019-06-12 NOTE — PROGRESS NOTES
Pt here for IV vitamin C and Mag infusion. Labs drawn and reviewed. Pt was treated without incident and discharged in stable condition. Pt will return on 6/21 for Vit C and Mag.

## 2019-06-21 ENCOUNTER — HOSPITAL ENCOUNTER (OUTPATIENT)
Dept: INFUSION THERAPY | Age: 84
Discharge: HOME | End: 2019-06-21
Payer: MEDICARE

## 2019-06-21 VITALS
SYSTOLIC BLOOD PRESSURE: 121 MMHG | RESPIRATION RATE: 16 BRPM | TEMPERATURE: 97.7 F | HEART RATE: 68 BPM | DIASTOLIC BLOOD PRESSURE: 73 MMHG

## 2019-06-21 DIAGNOSIS — C50.411 MALIGNANT NEOPLASM OF UPPER-OUTER QUADRANT OF RIGHT BREAST IN FEMALE, ESTROGEN RECEPTOR POSITIVE (HCC): ICD-10-CM

## 2019-06-21 DIAGNOSIS — D89.9 DISORDER INVOLVING IMMUNE MECHANISM (HCC): Primary | ICD-10-CM

## 2019-06-21 DIAGNOSIS — Z17.0 MALIGNANT NEOPLASM OF UPPER-OUTER QUADRANT OF RIGHT BREAST IN FEMALE, ESTROGEN RECEPTOR POSITIVE (HCC): ICD-10-CM

## 2019-06-21 PROCEDURE — 2500000003 HC RX 250 WO HCPCS: Performed by: INTERNAL MEDICINE

## 2019-06-21 PROCEDURE — 96366 THER/PROPH/DIAG IV INF ADDON: CPT

## 2019-06-21 PROCEDURE — 96365 THER/PROPH/DIAG IV INF INIT: CPT

## 2019-06-21 RX ORDER — SODIUM CHLORIDE 0.9 % (FLUSH) 0.9 %
10 SYRINGE (ML) INJECTION PRN
Status: CANCELLED | OUTPATIENT
Start: 2019-06-28

## 2019-06-21 RX ORDER — SODIUM CHLORIDE 9 MG/ML
INJECTION, SOLUTION INTRAVENOUS ONCE
Status: CANCELLED | OUTPATIENT
Start: 2019-06-28

## 2019-06-21 RX ADMIN — MAGNESIUM CHLORIDE: 200 INJECTION INTRAVENOUS at 14:40

## 2019-06-21 NOTE — PROGRESS NOTES
Pt here for Vit C/Mag infusion. Pt was treated without incident and discharged in stable condition. She will return on 6/28/19 for next infusion.

## 2019-07-03 ENCOUNTER — HOSPITAL ENCOUNTER (OUTPATIENT)
Dept: INFUSION THERAPY | Age: 84
Discharge: HOME | End: 2019-07-03
Payer: MEDICARE

## 2019-07-03 VITALS
RESPIRATION RATE: 16 BRPM | HEART RATE: 67 BPM | DIASTOLIC BLOOD PRESSURE: 74 MMHG | TEMPERATURE: 98.6 F | SYSTOLIC BLOOD PRESSURE: 126 MMHG

## 2019-07-03 DIAGNOSIS — C50.411 MALIGNANT NEOPLASM OF UPPER-OUTER QUADRANT OF RIGHT BREAST IN FEMALE, ESTROGEN RECEPTOR POSITIVE (HCC): ICD-10-CM

## 2019-07-03 DIAGNOSIS — D89.9 DISORDER INVOLVING IMMUNE MECHANISM (HCC): Primary | ICD-10-CM

## 2019-07-03 DIAGNOSIS — Z17.0 MALIGNANT NEOPLASM OF UPPER-OUTER QUADRANT OF RIGHT BREAST IN FEMALE, ESTROGEN RECEPTOR POSITIVE (HCC): ICD-10-CM

## 2019-07-03 PROCEDURE — 96360 HYDRATION IV INFUSION INIT: CPT

## 2019-07-03 PROCEDURE — 96361 HYDRATE IV INFUSION ADD-ON: CPT

## 2019-07-03 PROCEDURE — 2500000003 HC RX 250 WO HCPCS: Performed by: INTERNAL MEDICINE

## 2019-07-03 RX ORDER — SODIUM CHLORIDE 0.9 % (FLUSH) 0.9 %
10 SYRINGE (ML) INJECTION PRN
Status: CANCELLED | OUTPATIENT
Start: 2019-07-10

## 2019-07-03 RX ORDER — SODIUM CHLORIDE 9 MG/ML
INJECTION, SOLUTION INTRAVENOUS ONCE
Status: CANCELLED | OUTPATIENT
Start: 2019-07-10

## 2019-07-03 RX ADMIN — MAGNESIUM CHLORIDE: 200 INJECTION INTRAVENOUS at 13:25

## 2019-07-03 ASSESSMENT — PAIN SCALES - GENERAL: PAINLEVEL_OUTOF10: 5

## 2019-07-03 ASSESSMENT — PAIN DESCRIPTION - ORIENTATION: ORIENTATION: RIGHT

## 2019-07-03 ASSESSMENT — PAIN DESCRIPTION - LOCATION: LOCATION: HIP

## 2019-07-03 ASSESSMENT — PAIN DESCRIPTION - PAIN TYPE: TYPE: CHRONIC PAIN

## 2019-07-12 ENCOUNTER — HOSPITAL ENCOUNTER (OUTPATIENT)
Dept: INFUSION THERAPY | Age: 84
Discharge: HOME | End: 2019-07-12
Payer: MEDICARE

## 2019-07-12 VITALS
RESPIRATION RATE: 16 BRPM | DIASTOLIC BLOOD PRESSURE: 80 MMHG | HEART RATE: 60 BPM | SYSTOLIC BLOOD PRESSURE: 128 MMHG | TEMPERATURE: 98 F

## 2019-07-12 DIAGNOSIS — Z17.0 MALIGNANT NEOPLASM OF UPPER-OUTER QUADRANT OF RIGHT BREAST IN FEMALE, ESTROGEN RECEPTOR POSITIVE (HCC): ICD-10-CM

## 2019-07-12 DIAGNOSIS — D89.9 DISORDER INVOLVING IMMUNE MECHANISM (HCC): Primary | ICD-10-CM

## 2019-07-12 DIAGNOSIS — C50.411 MALIGNANT NEOPLASM OF UPPER-OUTER QUADRANT OF RIGHT BREAST IN FEMALE, ESTROGEN RECEPTOR POSITIVE (HCC): ICD-10-CM

## 2019-07-12 PROCEDURE — 2500000003 HC RX 250 WO HCPCS: Performed by: INTERNAL MEDICINE

## 2019-07-12 PROCEDURE — 96360 HYDRATION IV INFUSION INIT: CPT

## 2019-07-12 PROCEDURE — 96361 HYDRATE IV INFUSION ADD-ON: CPT

## 2019-07-12 RX ORDER — SODIUM CHLORIDE 9 MG/ML
INJECTION, SOLUTION INTRAVENOUS ONCE
Status: CANCELLED | OUTPATIENT
Start: 2019-07-19

## 2019-07-12 RX ORDER — SODIUM CHLORIDE 0.9 % (FLUSH) 0.9 %
10 SYRINGE (ML) INJECTION PRN
Status: CANCELLED | OUTPATIENT
Start: 2019-07-19

## 2019-07-12 RX ADMIN — MAGNESIUM CHLORIDE: 200 INJECTION INTRAVENOUS at 13:25

## 2019-07-12 NOTE — PROGRESS NOTES
Patient here for Vit C Mag Potassium Hydration. Vitals stable Patient tolerated treatment well and was discharged with family in stable condition. Patient to return 7/17 for Hydration.

## 2019-07-17 ENCOUNTER — HOSPITAL ENCOUNTER (OUTPATIENT)
Dept: INFUSION THERAPY | Age: 84
Discharge: HOME | End: 2019-07-17
Payer: MEDICARE

## 2019-07-17 VITALS
SYSTOLIC BLOOD PRESSURE: 119 MMHG | DIASTOLIC BLOOD PRESSURE: 47 MMHG | TEMPERATURE: 98.7 F | HEART RATE: 60 BPM | RESPIRATION RATE: 17 BRPM

## 2019-07-17 DIAGNOSIS — D89.9 DISORDER INVOLVING IMMUNE MECHANISM (HCC): Primary | ICD-10-CM

## 2019-07-17 DIAGNOSIS — Z17.0 MALIGNANT NEOPLASM OF UPPER-OUTER QUADRANT OF RIGHT BREAST IN FEMALE, ESTROGEN RECEPTOR POSITIVE (HCC): ICD-10-CM

## 2019-07-17 DIAGNOSIS — C50.411 MALIGNANT NEOPLASM OF UPPER-OUTER QUADRANT OF RIGHT BREAST IN FEMALE, ESTROGEN RECEPTOR POSITIVE (HCC): ICD-10-CM

## 2019-07-17 PROCEDURE — 96365 THER/PROPH/DIAG IV INF INIT: CPT

## 2019-07-17 PROCEDURE — 96366 THER/PROPH/DIAG IV INF ADDON: CPT

## 2019-07-17 PROCEDURE — 2500000003 HC RX 250 WO HCPCS: Performed by: INTERNAL MEDICINE

## 2019-07-17 PROCEDURE — 96361 HYDRATE IV INFUSION ADD-ON: CPT

## 2019-07-17 PROCEDURE — 96360 HYDRATION IV INFUSION INIT: CPT

## 2019-07-17 RX ORDER — SODIUM CHLORIDE 0.9 % (FLUSH) 0.9 %
10 SYRINGE (ML) INJECTION PRN
Status: CANCELLED | OUTPATIENT
Start: 2019-07-24

## 2019-07-17 RX ORDER — SODIUM CHLORIDE 9 MG/ML
INJECTION, SOLUTION INTRAVENOUS ONCE
Status: DISCONTINUED | OUTPATIENT
Start: 2019-07-17 | End: 2019-07-18 | Stop reason: HOSPADM

## 2019-07-17 RX ORDER — SODIUM CHLORIDE 9 MG/ML
INJECTION, SOLUTION INTRAVENOUS ONCE
Status: CANCELLED | OUTPATIENT
Start: 2019-07-24

## 2019-07-17 RX ADMIN — MAGNESIUM CHLORIDE: 200 INJECTION INTRAVENOUS at 13:35

## 2019-07-17 NOTE — PROGRESS NOTES
Patient here for hydration. Vitals stable. Patient tolerated infusion well and was discharged home with family in stable condition. Patient to return 7/24 for hydration.

## 2019-07-24 ENCOUNTER — HOSPITAL ENCOUNTER (OUTPATIENT)
Dept: INFUSION THERAPY | Age: 84
Discharge: HOME | End: 2019-07-24
Payer: MEDICARE

## 2019-07-24 VITALS
DIASTOLIC BLOOD PRESSURE: 62 MMHG | RESPIRATION RATE: 16 BRPM | SYSTOLIC BLOOD PRESSURE: 100 MMHG | HEART RATE: 60 BPM | TEMPERATURE: 98 F | OXYGEN SATURATION: 94 %

## 2019-07-24 DIAGNOSIS — Z17.0 MALIGNANT NEOPLASM OF UPPER-OUTER QUADRANT OF RIGHT BREAST IN FEMALE, ESTROGEN RECEPTOR POSITIVE (HCC): ICD-10-CM

## 2019-07-24 DIAGNOSIS — C50.411 MALIGNANT NEOPLASM OF UPPER-OUTER QUADRANT OF RIGHT BREAST IN FEMALE, ESTROGEN RECEPTOR POSITIVE (HCC): ICD-10-CM

## 2019-07-24 DIAGNOSIS — D89.9 DISORDER INVOLVING IMMUNE MECHANISM (HCC): Primary | ICD-10-CM

## 2019-07-24 PROCEDURE — 96360 HYDRATION IV INFUSION INIT: CPT

## 2019-07-24 PROCEDURE — 96361 HYDRATE IV INFUSION ADD-ON: CPT

## 2019-07-24 PROCEDURE — 2500000003 HC RX 250 WO HCPCS: Performed by: INTERNAL MEDICINE

## 2019-07-24 RX ORDER — SODIUM CHLORIDE 0.9 % (FLUSH) 0.9 %
10 SYRINGE (ML) INJECTION PRN
Status: CANCELLED | OUTPATIENT
Start: 2019-07-31

## 2019-07-24 RX ORDER — SODIUM CHLORIDE 9 MG/ML
INJECTION, SOLUTION INTRAVENOUS ONCE
Status: CANCELLED | OUTPATIENT
Start: 2019-07-31

## 2019-07-24 RX ADMIN — MAGNESIUM CHLORIDE: 200 INJECTION INTRAVENOUS at 14:02

## 2019-07-24 NOTE — PROGRESS NOTES
Pt here for vit C + magnesium hydration. Tolerated treatment without incident. Discharged by wheelchair with  in stable condition.

## 2019-07-31 ENCOUNTER — HOSPITAL ENCOUNTER (OUTPATIENT)
Dept: INFUSION THERAPY | Age: 84
Discharge: HOME | End: 2019-07-31
Payer: MEDICARE

## 2019-07-31 VITALS
HEART RATE: 56 BPM | DIASTOLIC BLOOD PRESSURE: 49 MMHG | TEMPERATURE: 97 F | RESPIRATION RATE: 16 BRPM | SYSTOLIC BLOOD PRESSURE: 102 MMHG

## 2019-07-31 DIAGNOSIS — D89.9 DISORDER INVOLVING IMMUNE MECHANISM (HCC): Primary | ICD-10-CM

## 2019-07-31 DIAGNOSIS — C50.411 MALIGNANT NEOPLASM OF UPPER-OUTER QUADRANT OF RIGHT BREAST IN FEMALE, ESTROGEN RECEPTOR POSITIVE (HCC): ICD-10-CM

## 2019-07-31 DIAGNOSIS — Z17.0 MALIGNANT NEOPLASM OF UPPER-OUTER QUADRANT OF RIGHT BREAST IN FEMALE, ESTROGEN RECEPTOR POSITIVE (HCC): ICD-10-CM

## 2019-07-31 PROCEDURE — 96360 HYDRATION IV INFUSION INIT: CPT

## 2019-07-31 PROCEDURE — 2500000003 HC RX 250 WO HCPCS: Performed by: INTERNAL MEDICINE

## 2019-07-31 PROCEDURE — 96361 HYDRATE IV INFUSION ADD-ON: CPT

## 2019-07-31 RX ORDER — SODIUM CHLORIDE 9 MG/ML
INJECTION, SOLUTION INTRAVENOUS ONCE
Status: CANCELLED | OUTPATIENT
Start: 2019-08-07

## 2019-07-31 RX ORDER — SODIUM CHLORIDE 9 MG/ML
INJECTION, SOLUTION INTRAVENOUS ONCE
Status: DISCONTINUED | OUTPATIENT
Start: 2019-07-31 | End: 2019-08-01 | Stop reason: HOSPADM

## 2019-07-31 RX ORDER — SODIUM CHLORIDE 0.9 % (FLUSH) 0.9 %
10 SYRINGE (ML) INJECTION PRN
Status: CANCELLED | OUTPATIENT
Start: 2019-08-07

## 2019-07-31 RX ADMIN — MAGNESIUM CHLORIDE: 200 INJECTION INTRAVENOUS at 13:19

## 2019-07-31 NOTE — PROGRESS NOTES
Pt here for Vit C/Mag infusion. Given without incident. Pt d/c'd in stable condition and will return on 8/9/19 for next infusion.

## 2019-08-09 ENCOUNTER — HOSPITAL ENCOUNTER (OUTPATIENT)
Dept: INFUSION THERAPY | Age: 84
Discharge: HOME | End: 2019-08-09
Payer: MEDICARE

## 2019-08-09 VITALS
TEMPERATURE: 98.4 F | HEART RATE: 62 BPM | SYSTOLIC BLOOD PRESSURE: 123 MMHG | RESPIRATION RATE: 16 BRPM | DIASTOLIC BLOOD PRESSURE: 72 MMHG

## 2019-08-09 DIAGNOSIS — C50.411 MALIGNANT NEOPLASM OF UPPER-OUTER QUADRANT OF RIGHT BREAST IN FEMALE, ESTROGEN RECEPTOR POSITIVE (HCC): ICD-10-CM

## 2019-08-09 DIAGNOSIS — Z17.0 MALIGNANT NEOPLASM OF UPPER-OUTER QUADRANT OF RIGHT BREAST IN FEMALE, ESTROGEN RECEPTOR POSITIVE (HCC): ICD-10-CM

## 2019-08-09 DIAGNOSIS — D89.9 DISORDER INVOLVING IMMUNE MECHANISM (HCC): Primary | ICD-10-CM

## 2019-08-09 PROCEDURE — 2500000003 HC RX 250 WO HCPCS: Performed by: INTERNAL MEDICINE

## 2019-08-09 PROCEDURE — 96361 HYDRATE IV INFUSION ADD-ON: CPT

## 2019-08-09 PROCEDURE — 96360 HYDRATION IV INFUSION INIT: CPT

## 2019-08-09 RX ORDER — SODIUM CHLORIDE 9 MG/ML
INJECTION, SOLUTION INTRAVENOUS ONCE
Status: CANCELLED | OUTPATIENT
Start: 2019-08-14

## 2019-08-09 RX ORDER — SODIUM CHLORIDE 9 MG/ML
INJECTION, SOLUTION INTRAVENOUS ONCE
Status: DISCONTINUED | OUTPATIENT
Start: 2019-08-09 | End: 2019-08-10 | Stop reason: HOSPADM

## 2019-08-09 RX ORDER — SODIUM CHLORIDE 0.9 % (FLUSH) 0.9 %
10 SYRINGE (ML) INJECTION PRN
Status: CANCELLED | OUTPATIENT
Start: 2019-08-14

## 2019-08-09 RX ADMIN — MAGNESIUM CHLORIDE: 200 INJECTION INTRAVENOUS at 13:04

## 2019-08-09 NOTE — PROGRESS NOTES
Pt here for Vitamin C and Mag infusion. Pt was treated without incident and discharged in stable condition. Pt will return in 1 week for next infusion.

## 2019-08-15 ENCOUNTER — TELEPHONE (OUTPATIENT)
Dept: ONCOLOGY | Age: 84
End: 2019-08-15

## 2019-09-04 ENCOUNTER — OFFICE VISIT (OUTPATIENT)
Dept: ORTHOPEDIC SURGERY | Age: 84
End: 2019-09-04
Payer: MEDICARE

## 2019-09-04 VITALS
HEIGHT: 64 IN | WEIGHT: 119.93 LBS | HEART RATE: 61 BPM | DIASTOLIC BLOOD PRESSURE: 73 MMHG | SYSTOLIC BLOOD PRESSURE: 125 MMHG | BODY MASS INDEX: 20.47 KG/M2

## 2019-09-04 DIAGNOSIS — S72.044D CLOSED NONDISPLACED BASICERVICAL FRACTURE OF RIGHT FEMUR WITH ROUTINE HEALING, SUBSEQUENT ENCOUNTER: Primary | ICD-10-CM

## 2019-09-04 PROCEDURE — G8420 CALC BMI NORM PARAMETERS: HCPCS | Performed by: ORTHOPAEDIC SURGERY

## 2019-09-04 PROCEDURE — 4040F PNEUMOC VAC/ADMIN/RCVD: CPT | Performed by: ORTHOPAEDIC SURGERY

## 2019-09-04 PROCEDURE — G8400 PT W/DXA NO RESULTS DOC: HCPCS | Performed by: ORTHOPAEDIC SURGERY

## 2019-09-04 PROCEDURE — 1036F TOBACCO NON-USER: CPT | Performed by: ORTHOPAEDIC SURGERY

## 2019-09-04 PROCEDURE — 1123F ACP DISCUSS/DSCN MKR DOCD: CPT | Performed by: ORTHOPAEDIC SURGERY

## 2019-09-04 PROCEDURE — 99213 OFFICE O/P EST LOW 20 MIN: CPT | Performed by: ORTHOPAEDIC SURGERY

## 2019-09-04 PROCEDURE — 1090F PRES/ABSN URINE INCON ASSESS: CPT | Performed by: ORTHOPAEDIC SURGERY

## 2019-09-04 PROCEDURE — G8427 DOCREV CUR MEDS BY ELIG CLIN: HCPCS | Performed by: ORTHOPAEDIC SURGERY

## 2019-09-04 RX ORDER — ESCITALOPRAM OXALATE 10 MG/1
5 TABLET ORAL 2 TIMES DAILY
COMMUNITY

## 2019-09-04 NOTE — PROGRESS NOTES
cholesterol     History of appetite changes     Invasive ductal carcinoma of breast (HCC)     Lymphedema     right arm     Nausea & vomiting     Nervous     Wears glasses     Weight gain      Past Surgical History:   Procedure Laterality Date    APPENDECTOMY  12/2000    [facility] [place] [facility] [address] Right 11/01/2016    [facility]    CATARACT REMOVAL WITH IMPLANT Left 12/06/2016    [facility]    CATARACT REMOVAL WITH IMPLANT Left 12/06/2015    FEMUR FRACTURE SURGERY Right 1/22/2019    INSERTION SHORT TFN - SYNTHES, C-ARM  *SHORT STAY performed by Royce Aguillon MD at [address] Right 01/22/2019    Insertion short TFN synthes    TONSILLECTOMY AND ADENOIDECTOMY Bilateral [address]     Family History   Problem Relation Age of Onset    Hypertension Mother     Heart Failure Mother     Hypertension Father     Stroke Father     Kidney Disease Brother      Social History     Tobacco Use    Smoking status: Never Smoker    Smokeless tobacco: Never Used   Substance Use Topics    Alcohol use: No    Drug use: No       Objective:     Vitals:    09/04/19 1032   BP: 125/73   Pulse: 61   Weight: 119 lb 14.9 oz (54.4 kg)   Height: 5' 4.02\" (1.626 m)     Physical Exam  On examination the patient is alert and oriented x3 and appears well kempt. She is in a wheelchair. Incisions are well-healed. She does have very limited motion with pain. I did not push to try to move her hip at all. No obvious motor or sensory deficits. Again on x-rays done recently the patient has protrusion of the blade with presumed collapse of the fracture. Radiology:            Impression:        Assessment:          Plan:      We will proceed with surgery tomorrow for removal of the TFN and probable hemiarthroplasty     Please be aware that portions of this chart note were created using voice recognition software and that unforseen errors may have occured   Electronically signed by Jeff Bliss MD on 9/4/2019 at 10:49 AM

## 2019-09-05 ENCOUNTER — HOSPITAL ENCOUNTER (INPATIENT)
Age: 84
LOS: 2 days | Discharge: NURSING, REHAB OR LONG-TERM CARE | DRG: 470 | End: 2019-09-07
Attending: ORTHOPAEDIC SURGERY | Admitting: ORTHOPAEDIC SURGERY
Payer: MEDICARE

## 2019-09-05 ENCOUNTER — APPOINTMENT (OUTPATIENT)
Dept: GENERAL RADIOLOGY | Age: 84
DRG: 470 | End: 2019-09-05
Attending: ORTHOPAEDIC SURGERY
Payer: MEDICARE

## 2019-09-05 ENCOUNTER — ANESTHESIA (OUTPATIENT)
Dept: OPERATING ROOM | Age: 84
DRG: 470 | End: 2019-09-05
Payer: MEDICARE

## 2019-09-05 ENCOUNTER — ANESTHESIA EVENT (OUTPATIENT)
Dept: OPERATING ROOM | Age: 84
DRG: 470 | End: 2019-09-05
Payer: MEDICARE

## 2019-09-05 VITALS — DIASTOLIC BLOOD PRESSURE: 108 MMHG | SYSTOLIC BLOOD PRESSURE: 166 MMHG | TEMPERATURE: 96.8 F | OXYGEN SATURATION: 100 %

## 2019-09-05 DIAGNOSIS — Z96.641 HISTORY OF RIGHT HIP HEMIARTHROPLASTY: Primary | ICD-10-CM

## 2019-09-05 PROBLEM — M16.11 ARTHRITIS OF RIGHT HIP: Status: ACTIVE | Noted: 2019-09-05

## 2019-09-05 PROBLEM — I26.99 OTHER PULMONARY EMBOLISM WITHOUT ACUTE COR PULMONALE (HCC): Status: ACTIVE | Noted: 2019-09-05

## 2019-09-05 PROBLEM — S72.144A CLOSED NONDISPLACED INTERTROCHANTERIC FRACTURE OF RIGHT FEMUR (HCC): Status: ACTIVE | Noted: 2019-02-21

## 2019-09-05 LAB
ABO/RH: NORMAL
ANTIBODY SCREEN: NEGATIVE
ARM BAND NUMBER: NORMAL
EXPIRATION DATE: NORMAL
HCT VFR BLD CALC: 32.9 % (ref 36.3–47.1)
HEMOGLOBIN: 10.9 G/DL (ref 11.9–15.1)

## 2019-09-05 PROCEDURE — 51701 INSERT BLADDER CATHETER: CPT

## 2019-09-05 PROCEDURE — 0QP604Z REMOVAL OF INTERNAL FIXATION DEVICE FROM RIGHT UPPER FEMUR, OPEN APPROACH: ICD-10-PCS | Performed by: ORTHOPAEDIC SURGERY

## 2019-09-05 PROCEDURE — 6360000002 HC RX W HCPCS: Performed by: ORTHOPAEDIC SURGERY

## 2019-09-05 PROCEDURE — 86850 RBC ANTIBODY SCREEN: CPT

## 2019-09-05 PROCEDURE — 6360000002 HC RX W HCPCS

## 2019-09-05 PROCEDURE — 6370000000 HC RX 637 (ALT 250 FOR IP): Performed by: ORTHOPAEDIC SURGERY

## 2019-09-05 PROCEDURE — 3700000001 HC ADD 15 MINUTES (ANESTHESIA): Performed by: ORTHOPAEDIC SURGERY

## 2019-09-05 PROCEDURE — 86900 BLOOD TYPING SEROLOGIC ABO: CPT

## 2019-09-05 PROCEDURE — 2580000003 HC RX 258: Performed by: ORTHOPAEDIC SURGERY

## 2019-09-05 PROCEDURE — 27125 PARTIAL HIP REPLACEMENT: CPT | Performed by: ORTHOPAEDIC SURGERY

## 2019-09-05 PROCEDURE — 2580000003 HC RX 258: Performed by: NURSE PRACTITIONER

## 2019-09-05 PROCEDURE — 73501 X-RAY EXAM HIP UNI 1 VIEW: CPT

## 2019-09-05 PROCEDURE — 0SRR0J9 REPLACEMENT OF RIGHT HIP JOINT, FEMORAL SURFACE WITH SYNTHETIC SUBSTITUTE, CEMENTED, OPEN APPROACH: ICD-10-PCS | Performed by: ORTHOPAEDIC SURGERY

## 2019-09-05 PROCEDURE — 3600000004 HC SURGERY LEVEL 4 BASE: Performed by: ORTHOPAEDIC SURGERY

## 2019-09-05 PROCEDURE — 3600000014 HC SURGERY LEVEL 4 ADDTL 15MIN: Performed by: ORTHOPAEDIC SURGERY

## 2019-09-05 PROCEDURE — C1776 JOINT DEVICE (IMPLANTABLE): HCPCS | Performed by: ORTHOPAEDIC SURGERY

## 2019-09-05 PROCEDURE — 86901 BLOOD TYPING SEROLOGIC RH(D): CPT

## 2019-09-05 PROCEDURE — 99221 1ST HOSP IP/OBS SF/LOW 40: CPT | Performed by: INTERNAL MEDICINE

## 2019-09-05 PROCEDURE — 2709999900 HC NON-CHARGEABLE SUPPLY: Performed by: ORTHOPAEDIC SURGERY

## 2019-09-05 PROCEDURE — 6360000002 HC RX W HCPCS: Performed by: ANESTHESIOLOGY

## 2019-09-05 PROCEDURE — 36415 COLL VENOUS BLD VENIPUNCTURE: CPT

## 2019-09-05 PROCEDURE — 51798 US URINE CAPACITY MEASURE: CPT

## 2019-09-05 PROCEDURE — 85018 HEMOGLOBIN: CPT

## 2019-09-05 PROCEDURE — 1200000000 HC SEMI PRIVATE

## 2019-09-05 PROCEDURE — 7100000001 HC PACU RECOVERY - ADDTL 15 MIN: Performed by: ORTHOPAEDIC SURGERY

## 2019-09-05 PROCEDURE — 3700000000 HC ANESTHESIA ATTENDED CARE: Performed by: ORTHOPAEDIC SURGERY

## 2019-09-05 PROCEDURE — 73502 X-RAY EXAM HIP UNI 2-3 VIEWS: CPT

## 2019-09-05 PROCEDURE — 2500000003 HC RX 250 WO HCPCS: Performed by: NURSE ANESTHETIST, CERTIFIED REGISTERED

## 2019-09-05 PROCEDURE — 7100000000 HC PACU RECOVERY - FIRST 15 MIN: Performed by: ORTHOPAEDIC SURGERY

## 2019-09-05 PROCEDURE — 6360000002 HC RX W HCPCS: Performed by: NURSE ANESTHETIST, CERTIFIED REGISTERED

## 2019-09-05 PROCEDURE — C1713 ANCHOR/SCREW BN/BN,TIS/BN: HCPCS | Performed by: ORTHOPAEDIC SURGERY

## 2019-09-05 PROCEDURE — 85014 HEMATOCRIT: CPT

## 2019-09-05 DEVICE — TANDEM UNIPOLAR 12/14 TAPER SLEEVE                                    + 0
Type: IMPLANTABLE DEVICE | Site: HIP | Status: FUNCTIONAL
Brand: TANDEM

## 2019-09-05 DEVICE — TANDEM UNIPOLAR HEAD 44MM
Type: IMPLANTABLE DEVICE | Site: HIP | Status: FUNCTIONAL
Brand: TANDEM

## 2019-09-05 DEVICE — IMPLANTABLE DEVICE: Type: IMPLANTABLE DEVICE | Site: HIP | Status: FUNCTIONAL

## 2019-09-05 DEVICE — CEMENT BNE 40 GM RADIOPAQUE BA SIMPLEX P: Type: IMPLANTABLE DEVICE | Site: HIP | Status: FUNCTIONAL

## 2019-09-05 RX ORDER — MORPHINE SULFATE 4 MG/ML
4 INJECTION, SOLUTION INTRAMUSCULAR; INTRAVENOUS
Status: DISCONTINUED | OUTPATIENT
Start: 2019-09-05 | End: 2019-09-05

## 2019-09-05 RX ORDER — OXYCODONE HYDROCHLORIDE 5 MG/1
10 TABLET ORAL EVERY 4 HOURS PRN
Status: DISCONTINUED | OUTPATIENT
Start: 2019-09-05 | End: 2019-09-07 | Stop reason: HOSPADM

## 2019-09-05 RX ORDER — UREA 10 %
3 LOTION (ML) TOPICAL NIGHTLY PRN
Status: DISCONTINUED | OUTPATIENT
Start: 2019-09-05 | End: 2019-09-07 | Stop reason: HOSPADM

## 2019-09-05 RX ORDER — FENTANYL CITRATE 50 UG/ML
INJECTION, SOLUTION INTRAMUSCULAR; INTRAVENOUS PRN
Status: DISCONTINUED | OUTPATIENT
Start: 2019-09-05 | End: 2019-09-05 | Stop reason: SDUPTHER

## 2019-09-05 RX ORDER — GLYCOPYRROLATE 1 MG/5 ML
SYRINGE (ML) INTRAVENOUS PRN
Status: DISCONTINUED | OUTPATIENT
Start: 2019-09-05 | End: 2019-09-05 | Stop reason: SDUPTHER

## 2019-09-05 RX ORDER — ROCURONIUM BROMIDE 10 MG/ML
INJECTION, SOLUTION INTRAVENOUS PRN
Status: DISCONTINUED | OUTPATIENT
Start: 2019-09-05 | End: 2019-09-05 | Stop reason: SDUPTHER

## 2019-09-05 RX ORDER — ONDANSETRON 2 MG/ML
INJECTION INTRAMUSCULAR; INTRAVENOUS PRN
Status: DISCONTINUED | OUTPATIENT
Start: 2019-09-05 | End: 2019-09-05 | Stop reason: SDUPTHER

## 2019-09-05 RX ORDER — FENTANYL 25 UG/H
1 PATCH TRANSDERMAL
Status: DISCONTINUED | OUTPATIENT
Start: 2019-09-05 | End: 2019-09-05

## 2019-09-05 RX ORDER — MORPHINE SULFATE 2 MG/ML
2 INJECTION, SOLUTION INTRAMUSCULAR; INTRAVENOUS
Status: DISCONTINUED | OUTPATIENT
Start: 2019-09-05 | End: 2019-09-05

## 2019-09-05 RX ORDER — FENTANYL CITRATE 50 UG/ML
25 INJECTION, SOLUTION INTRAMUSCULAR; INTRAVENOUS EVERY 5 MIN PRN
Status: CANCELLED | OUTPATIENT
Start: 2019-09-05

## 2019-09-05 RX ORDER — OXYCODONE HYDROCHLORIDE 5 MG/1
5 TABLET ORAL EVERY 4 HOURS PRN
Status: DISCONTINUED | OUTPATIENT
Start: 2019-09-05 | End: 2019-09-07 | Stop reason: HOSPADM

## 2019-09-05 RX ORDER — PROCHLORPERAZINE MALEATE 5 MG/1
5 TABLET ORAL 2 TIMES DAILY
Status: DISCONTINUED | OUTPATIENT
Start: 2019-09-05 | End: 2019-09-07 | Stop reason: HOSPADM

## 2019-09-05 RX ORDER — PANTOPRAZOLE SODIUM 40 MG/1
40 TABLET, DELAYED RELEASE ORAL
Status: DISCONTINUED | OUTPATIENT
Start: 2019-09-06 | End: 2019-09-07 | Stop reason: HOSPADM

## 2019-09-05 RX ORDER — SODIUM CHLORIDE, SODIUM LACTATE, POTASSIUM CHLORIDE, CALCIUM CHLORIDE 600; 310; 30; 20 MG/100ML; MG/100ML; MG/100ML; MG/100ML
INJECTION, SOLUTION INTRAVENOUS CONTINUOUS
Status: DISCONTINUED | OUTPATIENT
Start: 2019-09-05 | End: 2019-09-05

## 2019-09-05 RX ORDER — ACETAMINOPHEN 650 MG
TABLET, EXTENDED RELEASE ORAL PRN
Status: DISCONTINUED | OUTPATIENT
Start: 2019-09-05 | End: 2019-09-05 | Stop reason: ALTCHOICE

## 2019-09-05 RX ORDER — FENTANYL 50 UG/H
1 PATCH TRANSDERMAL
Status: CANCELLED | OUTPATIENT
Start: 2019-09-05

## 2019-09-05 RX ORDER — SODIUM CHLORIDE 0.9 % (FLUSH) 0.9 %
10 SYRINGE (ML) INJECTION EVERY 12 HOURS SCHEDULED
Status: DISCONTINUED | OUTPATIENT
Start: 2019-09-05 | End: 2019-09-07 | Stop reason: HOSPADM

## 2019-09-05 RX ORDER — NEOSTIGMINE METHYLSULFATE 5 MG/5 ML
SYRINGE (ML) INTRAVENOUS PRN
Status: DISCONTINUED | OUTPATIENT
Start: 2019-09-05 | End: 2019-09-05 | Stop reason: SDUPTHER

## 2019-09-05 RX ORDER — PREDNISONE 1 MG/1
5 TABLET ORAL DAILY
Status: DISCONTINUED | OUTPATIENT
Start: 2019-09-05 | End: 2019-09-07 | Stop reason: HOSPADM

## 2019-09-05 RX ORDER — ACETAMINOPHEN 500 MG
1000 TABLET ORAL EVERY 8 HOURS SCHEDULED
Status: DISCONTINUED | OUTPATIENT
Start: 2019-09-05 | End: 2019-09-07 | Stop reason: HOSPADM

## 2019-09-05 RX ORDER — PROPRANOLOL HYDROCHLORIDE 10 MG/1
10 TABLET ORAL DAILY
Status: DISCONTINUED | OUTPATIENT
Start: 2019-09-05 | End: 2019-09-07 | Stop reason: HOSPADM

## 2019-09-05 RX ORDER — KETOROLAC TROMETHAMINE 30 MG/ML
INJECTION, SOLUTION INTRAMUSCULAR; INTRAVENOUS
Status: DISPENSED
Start: 2019-09-05 | End: 2019-09-06

## 2019-09-05 RX ORDER — ONDANSETRON 2 MG/ML
4 INJECTION INTRAMUSCULAR; INTRAVENOUS EVERY 6 HOURS PRN
Status: DISCONTINUED | OUTPATIENT
Start: 2019-09-05 | End: 2019-09-07 | Stop reason: HOSPADM

## 2019-09-05 RX ORDER — MAGNESIUM HYDROXIDE 1200 MG/15ML
LIQUID ORAL CONTINUOUS PRN
Status: COMPLETED | OUTPATIENT
Start: 2019-09-05 | End: 2019-09-05

## 2019-09-05 RX ORDER — 0.9 % SODIUM CHLORIDE 0.9 %
500 INTRAVENOUS SOLUTION INTRAVENOUS ONCE
Status: COMPLETED | OUTPATIENT
Start: 2019-09-05 | End: 2019-09-05

## 2019-09-05 RX ORDER — PROPOFOL 10 MG/ML
INJECTION, EMULSION INTRAVENOUS PRN
Status: DISCONTINUED | OUTPATIENT
Start: 2019-09-05 | End: 2019-09-05 | Stop reason: SDUPTHER

## 2019-09-05 RX ORDER — SODIUM CHLORIDE 0.9 % (FLUSH) 0.9 %
10 SYRINGE (ML) INJECTION PRN
Status: DISCONTINUED | OUTPATIENT
Start: 2019-09-05 | End: 2019-09-07 | Stop reason: HOSPADM

## 2019-09-05 RX ORDER — PHENYLEPHRINE HYDROCHLORIDE 10 MG/ML
INJECTION INTRAVENOUS PRN
Status: DISCONTINUED | OUTPATIENT
Start: 2019-09-05 | End: 2019-09-05 | Stop reason: SDUPTHER

## 2019-09-05 RX ORDER — FENTANYL 25 UG/H
1 PATCH TRANSDERMAL
Status: DISCONTINUED | OUTPATIENT
Start: 2019-09-05 | End: 2019-09-07 | Stop reason: HOSPADM

## 2019-09-05 RX ORDER — LIDOCAINE HYDROCHLORIDE 10 MG/ML
INJECTION, SOLUTION EPIDURAL; INFILTRATION; INTRACAUDAL; PERINEURAL PRN
Status: DISCONTINUED | OUTPATIENT
Start: 2019-09-05 | End: 2019-09-05 | Stop reason: SDUPTHER

## 2019-09-05 RX ORDER — SODIUM CHLORIDE 9 MG/ML
INJECTION, SOLUTION INTRAVENOUS CONTINUOUS
Status: DISCONTINUED | OUTPATIENT
Start: 2019-09-05 | End: 2019-09-07 | Stop reason: HOSPADM

## 2019-09-05 RX ORDER — ESCITALOPRAM OXALATE 10 MG/1
5 TABLET ORAL 2 TIMES DAILY
Status: DISCONTINUED | OUTPATIENT
Start: 2019-09-05 | End: 2019-09-07 | Stop reason: HOSPADM

## 2019-09-05 RX ORDER — DEXAMETHASONE SODIUM PHOSPHATE 10 MG/ML
INJECTION INTRAMUSCULAR; INTRAVENOUS PRN
Status: DISCONTINUED | OUTPATIENT
Start: 2019-09-05 | End: 2019-09-05 | Stop reason: SDUPTHER

## 2019-09-05 RX ORDER — KETOROLAC TROMETHAMINE 30 MG/ML
30 INJECTION, SOLUTION INTRAMUSCULAR; INTRAVENOUS EVERY 6 HOURS
Status: DISCONTINUED | OUTPATIENT
Start: 2019-09-05 | End: 2019-09-06

## 2019-09-05 RX ORDER — FENTANYL CITRATE 50 UG/ML
INJECTION, SOLUTION INTRAMUSCULAR; INTRAVENOUS
Status: COMPLETED
Start: 2019-09-05 | End: 2019-09-05

## 2019-09-05 RX ADMIN — PROPOFOL 100 MG: 10 INJECTION, EMULSION INTRAVENOUS at 08:37

## 2019-09-05 RX ADMIN — HYDROMORPHONE HYDROCHLORIDE 0.25 MG: 1 INJECTION, SOLUTION INTRAMUSCULAR; INTRAVENOUS; SUBCUTANEOUS at 11:51

## 2019-09-05 RX ADMIN — SODIUM CHLORIDE 500 ML: 9 INJECTION, SOLUTION INTRAVENOUS at 19:44

## 2019-09-05 RX ADMIN — FENTANYL CITRATE 25 MCG: 50 INJECTION INTRAMUSCULAR; INTRAVENOUS at 12:30

## 2019-09-05 RX ADMIN — ESCITALOPRAM OXALATE 5 MG: 10 TABLET ORAL at 21:52

## 2019-09-05 RX ADMIN — Medication 3 MG: at 23:15

## 2019-09-05 RX ADMIN — PHENYLEPHRINE HYDROCHLORIDE 100 MCG: 10 INJECTION INTRAVENOUS at 09:24

## 2019-09-05 RX ADMIN — FENTANYL CITRATE 25 MCG: 50 INJECTION INTRAMUSCULAR; INTRAVENOUS at 12:25

## 2019-09-05 RX ADMIN — Medication 0.1 MG: at 09:35

## 2019-09-05 RX ADMIN — ACETAMINOPHEN 1000 MG: 500 TABLET ORAL at 15:28

## 2019-09-05 RX ADMIN — ACETAMINOPHEN 1000 MG: 500 TABLET ORAL at 21:51

## 2019-09-05 RX ADMIN — OXYCODONE HYDROCHLORIDE 10 MG: 5 TABLET ORAL at 18:20

## 2019-09-05 RX ADMIN — SODIUM CHLORIDE: 9 INJECTION, SOLUTION INTRAVENOUS at 19:43

## 2019-09-05 RX ADMIN — FENTANYL CITRATE 50 MCG: 50 INJECTION INTRAMUSCULAR; INTRAVENOUS at 09:02

## 2019-09-05 RX ADMIN — FENTANYL CITRATE 25 MCG: 50 INJECTION INTRAMUSCULAR; INTRAVENOUS at 12:45

## 2019-09-05 RX ADMIN — KETOROLAC TROMETHAMINE 30 MG: 30 INJECTION, SOLUTION INTRAMUSCULAR; INTRAVENOUS at 14:20

## 2019-09-05 RX ADMIN — OXYCODONE HYDROCHLORIDE 10 MG: 5 TABLET ORAL at 14:19

## 2019-09-05 RX ADMIN — KETOROLAC TROMETHAMINE 30 MG: 30 INJECTION, SOLUTION INTRAMUSCULAR; INTRAVENOUS at 21:55

## 2019-09-05 RX ADMIN — Medication 0.1 MG: at 09:29

## 2019-09-05 RX ADMIN — HYDROMORPHONE HYDROCHLORIDE 0.25 MG: 1 INJECTION, SOLUTION INTRAMUSCULAR; INTRAVENOUS; SUBCUTANEOUS at 11:30

## 2019-09-05 RX ADMIN — FENTANYL CITRATE 25 MCG: 50 INJECTION INTRAMUSCULAR; INTRAVENOUS at 10:34

## 2019-09-05 RX ADMIN — ROCURONIUM BROMIDE 40 MG: 10 INJECTION INTRAVENOUS at 08:37

## 2019-09-05 RX ADMIN — HYDROMORPHONE HYDROCHLORIDE 0.25 MG: 1 INJECTION, SOLUTION INTRAMUSCULAR; INTRAVENOUS; SUBCUTANEOUS at 11:23

## 2019-09-05 RX ADMIN — FENTANYL CITRATE 50 MCG: 50 INJECTION INTRAMUSCULAR; INTRAVENOUS at 08:37

## 2019-09-05 RX ADMIN — ONDANSETRON 4 MG: 2 INJECTION, SOLUTION INTRAMUSCULAR; INTRAVENOUS at 10:24

## 2019-09-05 RX ADMIN — SODIUM CHLORIDE, POTASSIUM CHLORIDE, SODIUM LACTATE AND CALCIUM CHLORIDE: 600; 310; 30; 20 INJECTION, SOLUTION INTRAVENOUS at 08:17

## 2019-09-05 RX ADMIN — Medication 3 MG: at 10:24

## 2019-09-05 RX ADMIN — PREDNISONE 5 MG: 5 TABLET ORAL at 17:06

## 2019-09-05 RX ADMIN — HYDROMORPHONE HYDROCHLORIDE 0.25 MG: 1 INJECTION, SOLUTION INTRAMUSCULAR; INTRAVENOUS; SUBCUTANEOUS at 11:18

## 2019-09-05 RX ADMIN — OXYCODONE HYDROCHLORIDE 10 MG: 5 TABLET ORAL at 23:15

## 2019-09-05 RX ADMIN — SODIUM CHLORIDE: 9 INJECTION, SOLUTION INTRAVENOUS at 14:21

## 2019-09-05 RX ADMIN — DEXAMETHASONE SODIUM PHOSPHATE 10 MG: 10 INJECTION INTRAMUSCULAR; INTRAVENOUS at 09:08

## 2019-09-05 RX ADMIN — HYDROCORTISONE SODIUM SUCCINATE 50 MG: 100 INJECTION, POWDER, FOR SOLUTION INTRAMUSCULAR; INTRAVENOUS at 09:21

## 2019-09-05 RX ADMIN — HYDROMORPHONE HYDROCHLORIDE 0.25 MG: 1 INJECTION, SOLUTION INTRAMUSCULAR; INTRAVENOUS; SUBCUTANEOUS at 11:28

## 2019-09-05 RX ADMIN — FENTANYL CITRATE 50 MCG: 50 INJECTION INTRAMUSCULAR; INTRAVENOUS at 09:39

## 2019-09-05 RX ADMIN — Medication 2 G: at 09:03

## 2019-09-05 RX ADMIN — PROPRANOLOL HYDROCHLORIDE 10 MG: 10 TABLET ORAL at 21:52

## 2019-09-05 RX ADMIN — FENTANYL CITRATE 25 MCG: 50 INJECTION INTRAMUSCULAR; INTRAVENOUS at 10:09

## 2019-09-05 RX ADMIN — FENTANYL CITRATE 25 MCG: 50 INJECTION INTRAMUSCULAR; INTRAVENOUS at 12:35

## 2019-09-05 RX ADMIN — Medication 0.4 MG: at 10:24

## 2019-09-05 RX ADMIN — Medication 2 G: at 17:04

## 2019-09-05 RX ADMIN — HYDROMORPHONE HYDROCHLORIDE 0.25 MG: 1 INJECTION, SOLUTION INTRAMUSCULAR; INTRAVENOUS; SUBCUTANEOUS at 12:01

## 2019-09-05 RX ADMIN — HYDROMORPHONE HYDROCHLORIDE 0.25 MG: 1 INJECTION, SOLUTION INTRAMUSCULAR; INTRAVENOUS; SUBCUTANEOUS at 11:56

## 2019-09-05 RX ADMIN — LIDOCAINE HYDROCHLORIDE 50 MG: 10 INJECTION, SOLUTION EPIDURAL; INFILTRATION; INTRACAUDAL; PERINEURAL at 08:37

## 2019-09-05 RX ADMIN — HYDROMORPHONE HYDROCHLORIDE 0.25 MG: 1 INJECTION, SOLUTION INTRAMUSCULAR; INTRAVENOUS; SUBCUTANEOUS at 12:06

## 2019-09-05 ASSESSMENT — PULMONARY FUNCTION TESTS
PIF_VALUE: 21
PIF_VALUE: 22
PIF_VALUE: 18
PIF_VALUE: 19
PIF_VALUE: 4
PIF_VALUE: 19
PIF_VALUE: 20
PIF_VALUE: 19
PIF_VALUE: 21
PIF_VALUE: 18
PIF_VALUE: 19
PIF_VALUE: 18
PIF_VALUE: 19
PIF_VALUE: 16
PIF_VALUE: 21
PIF_VALUE: 0
PIF_VALUE: 19
PIF_VALUE: 20
PIF_VALUE: 19
PIF_VALUE: 21
PIF_VALUE: 20
PIF_VALUE: 19
PIF_VALUE: 20
PIF_VALUE: 21
PIF_VALUE: 21
PIF_VALUE: 20
PIF_VALUE: 22
PIF_VALUE: 19
PIF_VALUE: 19
PIF_VALUE: 1
PIF_VALUE: 21
PIF_VALUE: 18
PIF_VALUE: 18
PIF_VALUE: 21
PIF_VALUE: 20
PIF_VALUE: 21
PIF_VALUE: 19
PIF_VALUE: 20
PIF_VALUE: 4
PIF_VALUE: 21
PIF_VALUE: 18
PIF_VALUE: 1
PIF_VALUE: 18
PIF_VALUE: 19
PIF_VALUE: 21
PIF_VALUE: 18
PIF_VALUE: 19
PIF_VALUE: 19
PIF_VALUE: 21
PIF_VALUE: 1
PIF_VALUE: 19
PIF_VALUE: 22
PIF_VALUE: 21
PIF_VALUE: 18
PIF_VALUE: 20
PIF_VALUE: 20
PIF_VALUE: 21
PIF_VALUE: 19
PIF_VALUE: 19
PIF_VALUE: 20
PIF_VALUE: 19
PIF_VALUE: 1
PIF_VALUE: 19
PIF_VALUE: 21
PIF_VALUE: 19
PIF_VALUE: 18
PIF_VALUE: 20
PIF_VALUE: 21
PIF_VALUE: 19
PIF_VALUE: 20
PIF_VALUE: 21
PIF_VALUE: 19
PIF_VALUE: 18
PIF_VALUE: 21
PIF_VALUE: 19
PIF_VALUE: 18
PIF_VALUE: 19
PIF_VALUE: 20
PIF_VALUE: 21
PIF_VALUE: 21
PIF_VALUE: 1
PIF_VALUE: 20
PIF_VALUE: 19
PIF_VALUE: 22
PIF_VALUE: 0
PIF_VALUE: 19
PIF_VALUE: 19
PIF_VALUE: 21
PIF_VALUE: 22
PIF_VALUE: 20
PIF_VALUE: 21
PIF_VALUE: 20
PIF_VALUE: 20
PIF_VALUE: 19
PIF_VALUE: 1
PIF_VALUE: 22
PIF_VALUE: 19
PIF_VALUE: 22
PIF_VALUE: 21
PIF_VALUE: 19
PIF_VALUE: 31
PIF_VALUE: 1
PIF_VALUE: 18
PIF_VALUE: 21
PIF_VALUE: 21
PIF_VALUE: 0
PIF_VALUE: 20
PIF_VALUE: 19
PIF_VALUE: 19
PIF_VALUE: 1
PIF_VALUE: 19
PIF_VALUE: 20
PIF_VALUE: 21
PIF_VALUE: 20
PIF_VALUE: 20
PIF_VALUE: 21
PIF_VALUE: 19
PIF_VALUE: 21
PIF_VALUE: 20
PIF_VALUE: 2
PIF_VALUE: 21
PIF_VALUE: 19
PIF_VALUE: 20
PIF_VALUE: 21
PIF_VALUE: 19
PIF_VALUE: 22
PIF_VALUE: 18
PIF_VALUE: 19
PIF_VALUE: 19
PIF_VALUE: 18
PIF_VALUE: 19
PIF_VALUE: 19
PIF_VALUE: 26
PIF_VALUE: 20
PIF_VALUE: 19
PIF_VALUE: 19
PIF_VALUE: 21
PIF_VALUE: 21
PIF_VALUE: 19
PIF_VALUE: 20
PIF_VALUE: 19
PIF_VALUE: 18
PIF_VALUE: 3

## 2019-09-05 ASSESSMENT — PAIN SCALES - GENERAL
PAINLEVEL_OUTOF10: 10
PAINLEVEL_OUTOF10: 10
PAINLEVEL_OUTOF10: 8
PAINLEVEL_OUTOF10: 10
PAINLEVEL_OUTOF10: 8
PAINLEVEL_OUTOF10: 10
PAINLEVEL_OUTOF10: 8
PAINLEVEL_OUTOF10: 10
PAINLEVEL_OUTOF10: 4
PAINLEVEL_OUTOF10: 10
PAINLEVEL_OUTOF10: 7
PAINLEVEL_OUTOF10: 7
PAINLEVEL_OUTOF10: 8
PAINLEVEL_OUTOF10: 8
PAINLEVEL_OUTOF10: 10

## 2019-09-05 ASSESSMENT — PAIN DESCRIPTION - PAIN TYPE
TYPE: ACUTE PAIN;SURGICAL PAIN
TYPE: SURGICAL PAIN
TYPE: SURGICAL PAIN

## 2019-09-05 ASSESSMENT — PAIN DESCRIPTION - ORIENTATION
ORIENTATION: RIGHT

## 2019-09-05 ASSESSMENT — PAIN DESCRIPTION - FREQUENCY
FREQUENCY: CONTINUOUS

## 2019-09-05 ASSESSMENT — PAIN DESCRIPTION - LOCATION
LOCATION: HIP

## 2019-09-05 ASSESSMENT — PAIN DESCRIPTION - ONSET: ONSET: ON-GOING

## 2019-09-05 ASSESSMENT — PAIN - FUNCTIONAL ASSESSMENT: PAIN_FUNCTIONAL_ASSESSMENT: 0-10

## 2019-09-05 ASSESSMENT — PAIN DESCRIPTION - DESCRIPTORS: DESCRIPTORS: CONSTANT

## 2019-09-05 NOTE — ANESTHESIA PRE PROCEDURE
Take 1 tablet by mouth daily Indications: ldn naltrexone 4.5mg   Yes Historical Provider, MD   NONFORMULARY Take 2 tablets by mouth daily Indications: metatrol   Yes Historical Provider, MD   escitalopram (LEXAPRO) 10 MG tablet Take 5 mg by mouth 2 times daily    Yes Historical Provider, MD   fentaNYL (DURAGESIC) 25 MCG/HR Place 1 patch onto the skin every 72 hours. Indications: 50mcg    Yes Historical Provider, MD   apixaban (ELIQUIS STARTER PACK) 5 MG TABS tablet Take 10 mg (2 tablets) orally twice daily for 7 days, then take 5 mg (1 tablet) orally twice daily thereafter. Patient taking differently: Take 2.5 mg by mouth 2 times daily Take 10 mg (2 tablets) orally twice daily for 7 days, then take 5 mg (1 tablet) orally twice daily thereafter. 3/11/19  Yes ELIGIO Estrada   melatonin 1 MG tablet Take 3 tablets by mouth nightly as needed for Sleep 3/11/19  Yes ELIGIO Estrada - CNS   pantoprazole (PROTONIX) 40 MG tablet Take 1 tablet by mouth every morning (before breakfast) 3/12/19  Yes ELIGIO Estrada - CNS   palbociclib (IBRANCE) 125 MG capsule Take 125 mg by mouth daily Indications: 21days on 7 days off    Yes Historical Provider, MD   predniSONE (DELTASONE) 10 MG tablet Take 5 mg by mouth daily    Yes Historical Provider, MD   propranolol (INDERAL) 10 MG tablet Take 10 mg by mouth daily   Yes Historical Provider, MD   prochlorperazine (COMPAZINE) 5 MG tablet Take 5 mg by mouth 2 times daily    Historical Provider, MD       Current medications:    Current Facility-Administered Medications   Medication Dose Route Frequency Provider Last Rate Last Dose    ceFAZolin (ANCEF) 2 g in dextrose 5 % 50 mL IVPB  2 g Intravenous Once Minus MD Henna        lactated ringers infusion   Intravenous Continuous Minus MD Henna 100 mL/hr at 09/05/19 1626         Allergies:     Allergies   Allergen Reactions    Ativan [Lorazepam] Other (See Comments)     Confusion, Fatigue        Problem List:    Patient Active Problem List   Diagnosis Code    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    Malignant neoplasm of upper-outer quadrant of right breast in female, estrogen receptor positive ([facility] C50.411, Z17.0    Hyponatremia E87.1    Sialadenitis K11.20    Autoimmune thyroiditis E06.3    Infiltrating ductal carcinoma of breast ([facility] C50.919    Hypothyroidism due to Hashimoto's thyroiditis E03.8, E06.3    Closed nondisplaced fracture of base of neck of right femur ([facility]) S72.044A    Pathologic fracture of femoral neck, right, initial encounter ([facility]) M84.451A    Post-op pain G89.18    Bone metastasis ([facility]) C79.51    Chronic diarrhea K52.9    Closed fracture of right hip with routine healing S72.001D    Dyslipidemia E78.5    Fatigue R53.83    Insomnia G47.00    Osteopenia M85.80    Recurrent major depressive disorder, in partial remission ([facility] F33.41    Spinal stenosis of lumbar region with neurogenic claudication M48.062    Systemic lupus erythematosus ([facility]) M32.9    Acute pulmonary embolism ([facility]) I26.99    Pleural effusion J90       Past Medical History:        Diagnosis Date    Anxiousness     Blood clot in vein     Depressed     High cholesterol     History of appetite changes     Invasive ductal carcinoma of breast (HCC)     Lymphedema     right arm     Nausea & vomiting     Nervous     Wears glasses     Weight gain        Past Surgical History:        Procedure Laterality Date    APPENDECTOMY  12/2000    [facility] [place] [address] Right 11/01/2016    [facility]    CATARACT REMOVAL WITH IMPLANT Left 12/06/2016    [facility]    CATARACT REMOVAL WITH IMPLANT Left 12/06/2015    FEMUR FRACTURE SURGERY Right 1/22/2019    INSERTION SHORT TFN - SYNTHES, C-ARM  *SHORT STAY performed by Vanna Hayden MD at [address] [facility] Right 01/22/2019    Insertion short TFN synthes    TONSILLECTOMY AND

## 2019-09-05 NOTE — LETTER
quality and coordination of care from the initial hospitalization through recovery. Through this bundled payment model, Donaldo Royal will receive additional payments if quality and spending performance are strong or, if not, potentially have to repay Medicare for a portion of the spending for care surrounding a lower extremity joint replacement procedure. Medicare is using the CJR model to encourage Donaldo Royal to work more closely with your doctors and other health care providers that help patients recover after discharge from the hospital, including nursing homes (skilled nursing facilities), home health agencies, inpatient rehabilitation facilities, and long-term care hospitals. The goal of the model is to encourage these providers and suppliers to provide you with better, more coordinated care during and following your hospital stay. The model is expected to lower the cost of care to [facility] but your costs for covered care will not increase due to these changes. Donaldo Royal is working closely with the doctors and other health care providers and suppliers who will care for you during and following your hospital stay and extending through the recovery period. By working together, your health care providers and suppliers are planning more efficient, high quality care as you undergo treatment. Medicare will monitor your care to ensure you and others are receiving high quality care. It's your choice which hospital, doctor, or other providers you use. You have the right to choose which hospital, doctor, or other post-hospital stay health care provider you use. · To find a different doctor, visit Medicare's Physician Compare website, Glassdoor.co.nz, or call 1-800-MEDICARE (603 5255). TTY users should call 5-392.912.8037.   · To find a different hospital, visit [email or web address]

## 2019-09-05 NOTE — CONSULTS
Overview Signed 3/8/2019  1:00 PM by ELIGIO Norris     Overview:   Related to metastatic disease-status post intramedullary nail January 2019-Dr. Theodore at [address] [facility]. Dyslipidemia 9/5/2019 Yes    [facility] 9/5/2019 Yes    Overview Signed 3/8/2019  1:00 PM by ELIGIO Norris     Overview:   Began in the fall of 2017. Symmetrical distal distribution involving metatarsal phalangeal proximal interphalangeal joints and the wrist bones. Also in the elbows shoulders and to some extent in the knees. Not particularly present in the feet. She has characteristic serology including low complement elevated double-stranded DNA severely elevated anti nuclear antibody and proteinuria. She has been treated successfully with prednisone. Her current rheumatologist is Dr. Rachel Juan. He has been somewhat reluctant to be overly aggressive about immune modulating treatment on the basis of the fact that she has advanced breast cancer. Arthritis of right hip 9/5/2019 Yes    Other pulmonary embolism without acute cor pulmonale ([facility] 9/5/2019 Yes          Plan:     1. Continue postop management per orthopedic surgery  2. Patient is on Eliquis for DVT prophylaxis, she has a history of pulmonary embolism and can resume her home dose of Eliquis when okay with orthopedic surgery, her home dose is higher than the DVT prophylaxis dose  3. Continue prednisone for history of SLE  4. Continue propranolol  5. Pain control per primary  6.  Patient has follow-up with PCP and other physicians for her cancer    Consultations:   IP CONSULT TO HOSPITALIST  IP CONSULT TO CASE MANAGEMENT      Gabino Boo MD  9/5/2019  6:09 PM    Copy sent to Dr. Chandana Brand MD

## 2019-09-06 LAB
-: ABNORMAL
ABSOLUTE EOS #: 0 K/UL (ref 0–0.44)
ABSOLUTE IMMATURE GRANULOCYTE: 0.04 K/UL (ref 0–0.3)
ABSOLUTE LYMPH #: 0.37 K/UL (ref 1.1–3.7)
ABSOLUTE MONO #: 0.33 K/UL (ref 0.1–1.2)
AMORPHOUS: ABNORMAL
ANION GAP SERPL CALCULATED.3IONS-SCNC: 12 MMOL/L (ref 9–17)
BACTERIA: ABNORMAL
BASOPHILS # BLD: 0 % (ref 0–2)
BASOPHILS ABSOLUTE: 0 K/UL (ref 0–0.2)
BILIRUBIN URINE: NEGATIVE
BUN BLDV-MCNC: 24 MG/DL (ref 8–23)
BUN/CREAT BLD: ABNORMAL (ref 9–20)
CALCIUM SERPL-MCNC: 8.1 MG/DL (ref 8.6–10.4)
CASTS UA: ABNORMAL /LPF (ref 0–2)
CASTS UA: ABNORMAL /LPF (ref 0–2)
CHLORIDE BLD-SCNC: 100 MMOL/L (ref 98–107)
CO2: 20 MMOL/L (ref 20–31)
COLOR: YELLOW
COMMENT UA: ABNORMAL
CREAT SERPL-MCNC: 1.13 MG/DL (ref 0.5–0.9)
CRYSTALS, UA: ABNORMAL /HPF
DIFFERENTIAL TYPE: ABNORMAL
EOSINOPHILS RELATIVE PERCENT: 0 % (ref 1–4)
EPITHELIAL CELLS UA: ABNORMAL /HPF (ref 0–5)
GFR AFRICAN AMERICAN: 56 ML/MIN
GFR NON-AFRICAN AMERICAN: 46 ML/MIN
GFR SERPL CREATININE-BSD FRML MDRD: ABNORMAL ML/MIN/{1.73_M2}
GFR SERPL CREATININE-BSD FRML MDRD: ABNORMAL ML/MIN/{1.73_M2}
GLUCOSE BLD-MCNC: 125 MG/DL (ref 70–99)
GLUCOSE URINE: NEGATIVE
HCT VFR BLD CALC: 29.1 % (ref 36.3–47.1)
HEMOGLOBIN: 9.9 G/DL (ref 11.9–15.1)
IMMATURE GRANULOCYTES: 1 %
KETONES, URINE: ABNORMAL
LEUKOCYTE ESTERASE, URINE: ABNORMAL
LYMPHOCYTES # BLD: 10 % (ref 24–43)
MCH RBC QN AUTO: 40.2 PG (ref 25.2–33.5)
MCHC RBC AUTO-ENTMCNC: 34 G/DL (ref 28.4–34.8)
MCV RBC AUTO: 118.3 FL (ref 82.6–102.9)
MONOCYTES # BLD: 9 % (ref 3–12)
MORPHOLOGY: ABNORMAL
MORPHOLOGY: ABNORMAL
MUCUS: ABNORMAL
NITRITE, URINE: NEGATIVE
NRBC AUTOMATED: 0 PER 100 WBC
OTHER OBSERVATIONS UA: ABNORMAL
PDW BLD-RTO: 15.1 % (ref 11.8–14.4)
PH UA: 5 (ref 5–8)
PLATELET # BLD: 151 K/UL (ref 138–453)
PLATELET ESTIMATE: ABNORMAL
PMV BLD AUTO: 9.3 FL (ref 8.1–13.5)
POTASSIUM SERPL-SCNC: 4.5 MMOL/L (ref 3.7–5.3)
PROTEIN UA: ABNORMAL
RBC # BLD: 2.46 M/UL (ref 3.95–5.11)
RBC # BLD: ABNORMAL 10*6/UL
RBC UA: ABNORMAL /HPF (ref 0–2)
RENAL EPITHELIAL, UA: ABNORMAL /HPF
SEG NEUTROPHILS: 80 % (ref 36–65)
SEGMENTED NEUTROPHILS ABSOLUTE COUNT: 2.96 K/UL (ref 1.5–8.1)
SODIUM BLD-SCNC: 132 MMOL/L (ref 135–144)
SPECIFIC GRAVITY UA: 1.03 (ref 1–1.03)
TRICHOMONAS: ABNORMAL
TURBIDITY: ABNORMAL
URINE HGB: NEGATIVE
UROBILINOGEN, URINE: NORMAL
WBC # BLD: 3.7 K/UL (ref 3.5–11.3)
WBC # BLD: ABNORMAL 10*3/UL
WBC UA: ABNORMAL /HPF (ref 0–5)
YEAST: ABNORMAL

## 2019-09-06 PROCEDURE — 87086 URINE CULTURE/COLONY COUNT: CPT

## 2019-09-06 PROCEDURE — 80048 BASIC METABOLIC PNL TOTAL CA: CPT

## 2019-09-06 PROCEDURE — 97110 THERAPEUTIC EXERCISES: CPT

## 2019-09-06 PROCEDURE — 81001 URINALYSIS AUTO W/SCOPE: CPT

## 2019-09-06 PROCEDURE — 97166 OT EVAL MOD COMPLEX 45 MIN: CPT

## 2019-09-06 PROCEDURE — 97116 GAIT TRAINING THERAPY: CPT

## 2019-09-06 PROCEDURE — 6360000002 HC RX W HCPCS: Performed by: INTERNAL MEDICINE

## 2019-09-06 PROCEDURE — 85025 COMPLETE CBC W/AUTO DIFF WBC: CPT

## 2019-09-06 PROCEDURE — 2580000003 HC RX 258: Performed by: INTERNAL MEDICINE

## 2019-09-06 PROCEDURE — 51798 US URINE CAPACITY MEASURE: CPT

## 2019-09-06 PROCEDURE — 6360000002 HC RX W HCPCS: Performed by: ORTHOPAEDIC SURGERY

## 2019-09-06 PROCEDURE — 6370000000 HC RX 637 (ALT 250 FOR IP): Performed by: ORTHOPAEDIC SURGERY

## 2019-09-06 PROCEDURE — 97162 PT EVAL MOD COMPLEX 30 MIN: CPT

## 2019-09-06 PROCEDURE — 97535 SELF CARE MNGMENT TRAINING: CPT

## 2019-09-06 PROCEDURE — 97530 THERAPEUTIC ACTIVITIES: CPT

## 2019-09-06 PROCEDURE — 99232 SBSQ HOSP IP/OBS MODERATE 35: CPT | Performed by: INTERNAL MEDICINE

## 2019-09-06 PROCEDURE — 1200000000 HC SEMI PRIVATE

## 2019-09-06 PROCEDURE — 36415 COLL VENOUS BLD VENIPUNCTURE: CPT

## 2019-09-06 RX ORDER — 0.9 % SODIUM CHLORIDE 0.9 %
500 INTRAVENOUS SOLUTION INTRAVENOUS ONCE
Status: COMPLETED | OUTPATIENT
Start: 2019-09-06 | End: 2019-09-06

## 2019-09-06 RX ORDER — OXYCODONE HYDROCHLORIDE AND ACETAMINOPHEN 5; 325 MG/1; MG/1
1 TABLET ORAL EVERY 6 HOURS PRN
Qty: 28 TABLET | Refills: 0 | Status: SHIPPED | OUTPATIENT
Start: 2019-09-06 | End: 2019-09-13

## 2019-09-06 RX ADMIN — SODIUM CHLORIDE 500 ML: 0.9 INJECTION, SOLUTION INTRAVENOUS at 10:33

## 2019-09-06 RX ADMIN — PROPRANOLOL HYDROCHLORIDE 10 MG: 10 TABLET ORAL at 21:24

## 2019-09-06 RX ADMIN — CEFTRIAXONE SODIUM 1 G: 1 INJECTION, POWDER, FOR SOLUTION INTRAMUSCULAR; INTRAVENOUS at 19:30

## 2019-09-06 RX ADMIN — ACETAMINOPHEN 1000 MG: 500 TABLET ORAL at 22:37

## 2019-09-06 RX ADMIN — OXYCODONE HYDROCHLORIDE 10 MG: 5 TABLET ORAL at 03:49

## 2019-09-06 RX ADMIN — SODIUM CHLORIDE: 9 INJECTION, SOLUTION INTRAVENOUS at 18:38

## 2019-09-06 RX ADMIN — ACETAMINOPHEN 1000 MG: 500 TABLET ORAL at 14:20

## 2019-09-06 RX ADMIN — PANTOPRAZOLE SODIUM 40 MG: 40 TABLET, DELAYED RELEASE ORAL at 06:09

## 2019-09-06 RX ADMIN — ACETAMINOPHEN 1000 MG: 500 TABLET ORAL at 06:09

## 2019-09-06 RX ADMIN — APIXABAN 2.5 MG: 2.5 TABLET, FILM COATED ORAL at 08:17

## 2019-09-06 RX ADMIN — Medication 2 G: at 01:00

## 2019-09-06 RX ADMIN — KETOROLAC TROMETHAMINE 30 MG: 30 INJECTION, SOLUTION INTRAMUSCULAR; INTRAVENOUS at 03:50

## 2019-09-06 RX ADMIN — APIXABAN 2.5 MG: 2.5 TABLET, FILM COATED ORAL at 21:24

## 2019-09-06 RX ADMIN — PROCHLORPERAZINE MALEATE 5 MG: 5 TABLET, FILM COATED ORAL at 08:17

## 2019-09-06 RX ADMIN — PREDNISONE 5 MG: 5 TABLET ORAL at 08:17

## 2019-09-06 RX ADMIN — ESCITALOPRAM OXALATE 5 MG: 10 TABLET ORAL at 08:17

## 2019-09-06 RX ADMIN — ESCITALOPRAM OXALATE 5 MG: 10 TABLET ORAL at 21:24

## 2019-09-06 RX ADMIN — Medication 3 MG: at 22:37

## 2019-09-06 ASSESSMENT — PAIN SCALES - GENERAL
PAINLEVEL_OUTOF10: 1
PAINLEVEL_OUTOF10: 7
PAINLEVEL_OUTOF10: 3
PAINLEVEL_OUTOF10: 2
PAINLEVEL_OUTOF10: 5
PAINLEVEL_OUTOF10: 7
PAINLEVEL_OUTOF10: 0
PAINLEVEL_OUTOF10: 2
PAINLEVEL_OUTOF10: 10
PAINLEVEL_OUTOF10: 7

## 2019-09-06 ASSESSMENT — PAIN DESCRIPTION - PROGRESSION

## 2019-09-06 ASSESSMENT — PAIN DESCRIPTION - PAIN TYPE
TYPE: SURGICAL PAIN;ACUTE PAIN
TYPE: ACUTE PAIN;SURGICAL PAIN

## 2019-09-06 ASSESSMENT — PAIN - FUNCTIONAL ASSESSMENT
PAIN_FUNCTIONAL_ASSESSMENT: ACTIVITIES ARE NOT PREVENTED
PAIN_FUNCTIONAL_ASSESSMENT: ACTIVITIES ARE NOT PREVENTED

## 2019-09-06 ASSESSMENT — PAIN DESCRIPTION - DESCRIPTORS
DESCRIPTORS: ACHING;JABBING
DESCRIPTORS: ACHING

## 2019-09-06 ASSESSMENT — PAIN DESCRIPTION - ORIENTATION
ORIENTATION: RIGHT
ORIENTATION: RIGHT

## 2019-09-06 ASSESSMENT — PAIN DESCRIPTION - LOCATION
LOCATION: HIP
LOCATION: HIP

## 2019-09-06 ASSESSMENT — PAIN DESCRIPTION - ONSET: ONSET: ON-GOING

## 2019-09-06 ASSESSMENT — PAIN DESCRIPTION - FREQUENCY
FREQUENCY: CONTINUOUS
FREQUENCY: INTERMITTENT

## 2019-09-06 NOTE — PROGRESS NOTES
SBA; no LOB noted)  Sitting - Dynamic: Fair;+  Standing - Static: Fair;+(RW for stability)  Standing - Dynamic: Fair       Exercises:  Seated: LAQx x 10 reps AROM, heel/toe raises x 10 reps  Supine: quad sets x 8 reps bilaterally     Goals  Short term goals  Time Frame for Short term goals: 10 visits  Short term goal 1: transfers with SBA  Short term goal 2: amb 125 ft with a RW x SBA with WBAT R LE  Short term goal 3: ascend/descend 4 steps with SBA  Short term goal 4: exercise program x SBA  Patient Goals   Patient goals : Return home    Plan    Plan  Times per week: BID  Current Treatment Recommendations: Strengthening, Transfer Training, Endurance Training, Gait Training, Functional Mobility Training, Safety Education & Training  Safety Devices  Type of devices:  All fall risk precautions in place, Gait belt, Call light within reach, Left in bed, Bed alarm in place, Patient at risk for falls, Nurse notified  Restraints  Initially in place: No     Therapy Time   Individual Concurrent Group Co-treatment   Time In 0145         Time Out 0224         Minutes [facility] [address], \[facility]\""

## 2019-09-06 NOTE — PROGRESS NOTES
Walker  Assistance: Minimal assistance  Distance: amb 20 ft with a RW x min assist with WBAT R LE     Balance  Posture: Good  Sitting - Static: Good  Sitting - Dynamic: Fair  Standing - Static: Fair  Standing - Dynamic: [address]  Times per week: BID  Current Treatment Recommendations: Strengthening, Transfer Training, Endurance Training, Gait Training, Functional Mobility Training, Safety Education & Training  Safety Devices  Type of devices: Call light within reach, Patient at risk for falls, Bed alarm in place, Nurse notified, Left in bed    G-Code       OutComes Score       AM-PAC Score  AM-PAC Inpatient Mobility Raw Score : 16 (09/06/19 1124)  AM-PAC Inpatient T-Scale Score : 40.78 (09/06/19 1124)  Mobility Inpatient CMS 0-100% Score: 54.16 (09/06/19 1124)  Mobility Inpatient CMS G-Code Modifier : CK (09/06/19 1124)       Goals  Short term goals  Time Frame for Short term goals: 10 visits  Short term goal 1: transfers with SBA  Short term goal 2: amb 125 ft with a RW x SBA with WBAT R LE  Short term goal 3: ascend/descend 4 steps with SBA  Short term goal 4: exercise program x SBA  Patient Goals   Patient goals : Return home       Therapy Time   Individual Concurrent Group Co-treatment   Time In 0532         Time Out 6725         Minutes 32             1 of [facility], PT

## 2019-09-06 NOTE — PLAN OF CARE
Problem: Falls - Risk of:  Goal: Will remain free from falls  Description  Will remain free from falls  9/6/2019 0336 by Lizbeth Storey RN  Outcome: Met This Shift     Problem: Falls - Risk of:  Goal: Absence of physical injury  Description  Absence of physical injury  9/6/2019 0336 by Lizbeth Storey RN  Outcome: Met This Shift     Problem: Infection - Surgical Site:  Goal: Signs of wound healing will improve  Description  Signs of wound healing will improve  9/6/2019 0336 by Lizbeth Storey RN  Outcome: Ongoing     Problem: Mobility - Impaired:  Goal: Achieve maximum mobility level  Description  Achieve maximum mobility level  9/6/2019 0336 by Lizbeth Storey RN  Outcome: Ongoing     Problem: Pain - Acute:  Goal: Pain level will decrease  Description  Pain level will decrease  9/6/2019 0336 by Lizbeth Storey RN  Outcome: Ongoing     Problem: Pain:  Goal: Pain level will decrease  Description  Pain level will decrease  9/6/2019 0336 by Lizbeth Storey RN  Outcome: Ongoing     Problem: Pain:  Goal: Control of acute pain  Description  Control of acute pain  Outcome: Ongoing     Problem: Pain:  Goal: Control of chronic pain  Description  Control of chronic pain  Outcome: Ongoing     Problem: Discharge Planning:  Goal: Knowledge of discharge instructions  Description  Knowledge of discharge instructions  9/6/2019 0336 by Lizbeth Storey RN  Outcome: Not Met This Shift

## 2019-09-06 NOTE — CONSULTS
Medication Sig Start Date End Date Taking? Authorizing Provider   oxyCODONE-acetaminophen (PERCOCET) 5-325 MG per tablet Take 1 tablet by mouth every 6 hours as needed for Pain for up to 7 days. Intended supply: 7 days.  Take lowest dose possible to manage pain 9/6/19 9/13/19 Yes Memo Just, DO   NONFORMULARY Take 2 tablets by mouth daily Indications: MAX 3   Yes Historical Provider, MD   NONFORMULARY Take 1 tablet by mouth 2 times daily Indications: methyl cpg   Yes Historical Provider, MD   LUTEIN PO Take 1 tablet by mouth daily   Yes Historical Provider, MD   NONFORMULARY Take 1 tablet by mouth daily Indications: align   Yes Historical Provider, MD   Multiple Vitamins-Minerals (B COMPLEX VITAMINS PLUS PO) Take 1 tablet by mouth 3 times daily   Yes Historical ProviderMD ROWANORMULARY Take 2 tablets by mouth 2 times daily Indications: cytoquel   Yes Historical ProviderMD ACOSTAULARY Take 1 tablet by mouth daily Indications: H2 absorb   Yes Historical ProviderMD ACOSTAULARY Take 1 tablet by mouth daily Indications: ubiquinol   Yes Historical ProviderMD ACOSTAULARY Take 1 tablet by mouth daily Indications: iodoral   Yes Historical ProviderMD ACOSTAULARY Take 1 tablet by mouth three times a week Indications: 4 life transfer/ mon wed/ friday   Yes Historical ProviderMD ACOSTAULARY Take 3 tablets by mouth four times a week Indications: sun tues thurs sat/ immune assist   Yes Historical ProviderMD FLEMING Take 1 tablet by mouth daily Indications: megaspore   Yes Historical ProviderMD ACOSTAULARY Take 1 tablet by mouth daily Indications: bio dent   Yes Historical ProviderMD ROWANORMULARY Take 2 tablets by mouth daily Indications: pc by body bio   Yes Historical ProviderMD ROWANORMULARY Take 1 tablet by mouth daily Indications: ldn naltrexone 4.5mg   Yes Historical ProviderMD ROWANORMULARY Take 2 tablets by mouth daily Indications: metatrol   Yes Historical Provider, MD   escitalopram (LEXAPRO) 10 MG tablet Take 5 mg by mouth 2 times daily    Yes Historical Provider, MD   fentaNYL (DURAGESIC) 25 MCG/HR Place 1 patch onto the skin every 72 hours. Indications: 50mcg    Yes Historical Provider, MD   apixaban (ELIQUIS STARTER PACK) 5 MG TABS tablet Take 10 mg (2 tablets) orally twice daily for 7 days, then take 5 mg (1 tablet) orally twice daily thereafter. Patient taking differently: Take 2.5 mg by mouth 2 times daily Take 10 mg (2 tablets) orally twice daily for 7 days, then take 5 mg (1 tablet) orally twice daily thereafter.  3/11/19  Yes Merle Drilling, APRN - CNS   melatonin 1 MG tablet Take 3 tablets by mouth nightly as needed for Sleep 3/11/19  Yes Mrele Drilling, APRN - CNS   pantoprazole (PROTONIX) 40 MG tablet Take 1 tablet by mouth every morning (before breakfast) 3/12/19  Yes Merle Drilling, APRN - CNS   palbociclib (IBRANCE) 125 MG capsule Take 125 mg by mouth daily Indications: 21days on 7 days off    Yes Historical Provider, MD   predniSONE (DELTASONE) 10 MG tablet Take 5 mg by mouth daily    Yes Historical Provider, MD   propranolol (INDERAL) 10 MG tablet Take 10 mg by mouth daily   Yes Historical Provider, MD   prochlorperazine (COMPAZINE) 5 MG tablet Take 5 mg by mouth 2 times daily    Historical Provider, MD       Allergies:  Ativan [lorazepam]    Social History:    Social History     Socioeconomic History    Marital status:      Spouse name: Not on file    Number of children: Not on file    Years of education: Not on file    Highest education level: Not on file   Occupational History    Not on file   Social Needs    Financial resource strain: Not on file    Food insecurity:     Worry: Not on file     Inability: Not on file    Transportation needs:     Medical: Not on file     Non-medical: Not on file   Tobacco Use    Smoking status: Never Smoker    Smokeless tobacco: Never Used   Substance and Sexual Activity    Alcohol use: No   

## 2019-09-06 NOTE — PROGRESS NOTES
Occupational Therapy   Occupational Therapy Initial Assessment  Date: 2019   Patient Name: Job Park  MRN: [id number]     : 1934    Date of Service: 2019    Discharge Recommendations:    Further therapy recommended at discharge. Assessment   Performance deficits / Impairments: Decreased functional mobility ; Decreased safe awareness;Decreased balance;Decreased ADL status; Decreased high-level IADLs  Assessment: pt requires increased physical assistance for functional transfers/mobility and ADLs at this time and would benefit from continued therapy at dicharge in order to increase independence and safety. Treatment Diagnosis: hardware removal and hip hemiarthroplasty   Prognosis: Good  Decision Making: Medium Complexity  Patient Education: pt ed on POC, purpsoe of eval, importance of movement, safety during functional transfers/functional mobility, hand placement during transfers. good return   REQUIRES OT FOLLOW UP: Yes  Activity Tolerance  Activity Tolerance: Patient Tolerated treatment well  Safety Devices  Safety Devices in place: Yes  Type of devices: Gait belt;Bed alarm in place;Call light within reach; Patient at risk for falls; Left in bed  Restraints  Initially in place: No         Patient Diagnosis(es): The encounter diagnosis was History of right hip hemiarthroplasty. has a past medical history of Anxiousness, Blood clot in vein, Depressed, High cholesterol, History of appetite changes, Invasive ductal carcinoma of breast ([facility] Lymphedema, Nausea & vomiting, Nervous, Wears glasses, and Weight gain. has a past surgical history that includes Tonsillectomy and adenoidectomy (Bilateral, 1940); Appendectomy (2000); Cataract removal with implant (Right, 2016); Cataract removal with implant (Left, 2016); Cataract removal with implant (Left, 2015); hip surgery (Right, 2019); Femur fracture surgery (Right, 2019);  Hardware Removal (Right, 2019); Total hip arthroplasty (Right, 09/05/2019); REMOVE HARDWARE HIP (Right, 9/5/2019); and Total hip arthroplasty (Right, 9/5/2019). Treatment Diagnosis: hardware removal and hip hemiarthroplasty       Restrictions  Restrictions/Precautions  Restrictions/Precautions: Fall Risk, Weight Bearing  Required Braces or Orthoses?: No  Lower Extremity Weight Bearing Restrictions  Right Lower Extremity Weight Bearing: Weight Bearing As Tolerated  Position Activity Restriction  Other position/activity restrictions: WBAT R LE    Subjective   General  Chart Reviewed: No  Patient assessed for rehabilitation services?: Yes  Family / Caregiver Present: Yes(pt son and son-in-law present for duration of session)  Diagnosis: hardware removal and hip hemiarthroplasty   Subjective  Subjective: co-eval with PT  General Comment  Comments: RN ok'd for therapy this morning.  Pt agreeable to participate in session and cooperative/pleasant throughout   Patient Currently in Pain: Denies    Oxygen Therapy  O2 Device: None (Room air)    Social/Functional History  Social/Functional History  Lives With: Spouse  Type of Home: House  Home Layout: One level  Home Access: Stairs to enter with rails  Entrance Stairs - Number of Steps: 2  Entrance Stairs - Rails: Both  Bathroom Shower/Tub: Tub/Shower unit, Walk-in shower(pt reported using walk-in shower more often )  Bathroom Equipment: Grab bars in shower, Shower chair  Home Equipment: 4 wheeled walker(pt reported using walker at all times )  Receives Help From: Family  ADL Assistance: Needs assistance(pt reported daughter assists with LB ADLs )  Homemaking Assistance: Needs assistance  Homemaking Responsibilities: No(pt reported daughter completes most IADLs )  Ambulation Assistance: Independent  Transfer Assistance: Independent  Active : No  Patient's  Info: children drive   Leisure & Hobbies: \"everything\"  Additional Comments: pt reported good family support      Objective   Vision: Within

## 2019-09-06 NOTE — PROGRESS NOTES
patient completed 500ml bolus, bladder scan @1345, 265. up walking with therapy,voided 35cc with micky color urine. U &A with reflex obtained as order via clean cath. denies discomfort. Dr Cliff Romeo notified and new order received urology consult.

## 2019-09-06 NOTE — PROGRESS NOTES
hemoptysis, shortness of breath, wheezing  Cardiovascular:  negative for chest pain, chest pressure/discomfort, lower extremity edema, palpitations  Gastrointestinal:  negative for abdominal pain, constipation, diarrhea, nausea, vomiting  Neurological:  negative for dizziness, headache  Positive for right hip pain  Medications: Allergies: Allergies   Allergen Reactions    Ativan [Lorazepam] Other (See Comments)     Confusion, Fatigue        Current Meds:   Scheduled Meds:    sodium chloride flush  10 mL Intravenous 2 times per day    acetaminophen  1,000 mg Oral 3 times per day    apixaban  2.5 mg Oral BID    escitalopram  5 mg Oral BID    palbociclib  125 mg Oral Daily    pantoprazole  40 mg Oral QAM AC    predniSONE  5 mg Oral Daily    prochlorperazine  5 mg Oral BID    propranolol  10 mg Oral Daily    fentaNYL  1 patch Transdermal Q72H     Continuous Infusions:    sodium chloride 75 mL/hr at 19 0813     PRN Meds: sodium chloride flush, magnesium hydroxide, ondansetron, oxyCODONE **OR** oxyCODONE, melatonin    Data:     Past Medical History:   has a past medical history of Anxiousness, Blood clot in vein, Depressed, High cholesterol, History of appetite changes, Invasive ductal carcinoma of breast ([facility] Lymphedema, Nausea & vomiting, Nervous, Wears glasses, and Weight gain. Social History:   reports that she has never smoked. She has never used smokeless tobacco. She reports that she does not drink alcohol or use drugs.      Family History:   Family History   Problem Relation Age of Onset    Hypertension Mother     Heart Failure Mother     Hypertension Father     Stroke Father     Kidney Disease Brother        Vitals:  /69   Pulse 80   Temp 98 °F (36.7 °C) (Oral)   Resp 16   Ht 5' 4\" (1.626 m)   Wt 128 lb (58.1 kg)   LMP 1984   SpO2 97%   BMI 21.97 kg/m²   Temp (24hrs), Av.8 °F (36.6 °C), Min:97.7 °F (36.5 °C), Max:98 °F (36.7 °C)    No results for input(s):

## 2019-09-06 NOTE — OP NOTE
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 PROCEDURES:  1. Removal of Synthes TFN nail, right proximal femur. 2.  Hemiarthroplasty, right hip, using [employer] size 10  stem, cemented, with a 44 unipolar head, 0 neck length. SURGEON:  Jagdish Davis. MD Arben    ESTIMATED BLOOD LOSS:  50 mL. BRIEF CLINICAL HISTORY:  The patient is an 15-year-old female, history  of breast cancer. I was consulted for a pathologic fracture of the  right proximal femur. This was treated with a Synthes TFN nail. The  patient initially was doing well. She did get radiation in the area. Subsequently was developing pain at the right hip. Repeat x-rays showed  that the blade was now protruding from the femoral head with apparent  avascular necrosis. We elected to proceed with removal of the TFN and  then a hemiarthroplasty. The patient understands the risk of procedure  including infection requiring operative irrigation and debridement. She  understands that she may still have some discomfort at the hip.   She  understands the risk with infection and possible need for removal of the  prosthesis; the risk of fracture, DVT with possible pulmonary embolism;  and the risk of blood loss requiring transfusion. OPERATIVE NOTE:  The patient was taken to the operating room on  09/05/2019, placed supine on the OR table. Anesthesia was induced via  general endotracheal intubation. She was given 2 gm Ancef IV for  prophylaxis. The patient was then carefully placed in the lateral  position on the flat Mulu Pillow table. She was held in place with a [facility] hip positioner. The right hip was then draped free, prepped and  draped in the usual sterile fashion. We then did a posterior approach  with the incision centered over the greater trochanter, carried distally  along the shaft of the femur and then proximally and posteriorly. This  was cut with a 10-blade. Cautery was used obtain hemostasis as well as  divide underlying subcu tissue down to the tensor fascia, which was  split in-line with the incision. This allowed placed of the Charnley  retractor, taking care that the blades posteriorly were free of the  sciatic nerve. The leg was then internally rotated as the short  external rotators and capsule were taken off the posterior aspect of the  femur and tagged for later repair. Hip was dislocated. Femoral head  was noted to have delaminated cartilage and subchondral bone loss  consistent with avascular necrosis. We then found the tip of the nail  by making a small incision at the tip of the greater trochanter. The  locking screw was backed up. We then made a small slice splitting the  vastus lateralis and then dissecting down to the entry point for the  blade. There was some bone covering it, which was cleared with a  curette. We could then place the extractor onto the blade and remove  it. We then removed the screw proximally and placed the extractor on  the nail to ensure it was well seated. I could then palpate the distal  locking screw. Small incision was made over this and dissection carried  directly down to the screw. It was removed and then the nail removed.    We could then proceed with

## 2019-09-07 VITALS
WEIGHT: 128 LBS | OXYGEN SATURATION: 98 % | RESPIRATION RATE: 18 BRPM | BODY MASS INDEX: 21.85 KG/M2 | HEART RATE: 61 BPM | TEMPERATURE: 98.1 F | HEIGHT: 64 IN | SYSTOLIC BLOOD PRESSURE: 146 MMHG | DIASTOLIC BLOOD PRESSURE: 65 MMHG

## 2019-09-07 LAB
ABSOLUTE EOS #: 0 K/UL (ref 0–0.44)
ABSOLUTE IMMATURE GRANULOCYTE: 0.04 K/UL (ref 0–0.3)
ABSOLUTE LYMPH #: 0.54 K/UL (ref 1.1–3.7)
ABSOLUTE MONO #: 0.5 K/UL (ref 0.1–1.2)
ANION GAP SERPL CALCULATED.3IONS-SCNC: 11 MMOL/L (ref 9–17)
BASOPHILS # BLD: 1 % (ref 0–2)
BASOPHILS ABSOLUTE: 0.04 K/UL (ref 0–0.2)
BUN BLDV-MCNC: 28 MG/DL (ref 8–23)
BUN/CREAT BLD: ABNORMAL (ref 9–20)
CALCIUM SERPL-MCNC: 8.4 MG/DL (ref 8.6–10.4)
CHLORIDE BLD-SCNC: 102 MMOL/L (ref 98–107)
CO2: 20 MMOL/L (ref 20–31)
CREAT SERPL-MCNC: 0.89 MG/DL (ref 0.5–0.9)
CULTURE: NO GROWTH
DIFFERENTIAL TYPE: ABNORMAL
EOSINOPHILS RELATIVE PERCENT: 0 % (ref 1–4)
GFR AFRICAN AMERICAN: >60 ML/MIN
GFR NON-AFRICAN AMERICAN: >60 ML/MIN
GFR SERPL CREATININE-BSD FRML MDRD: ABNORMAL ML/MIN/{1.73_M2}
GFR SERPL CREATININE-BSD FRML MDRD: ABNORMAL ML/MIN/{1.73_M2}
GLUCOSE BLD-MCNC: 89 MG/DL (ref 70–99)
HCT VFR BLD CALC: 29.1 % (ref 36.3–47.1)
HEMOGLOBIN: 9.4 G/DL (ref 11.9–15.1)
IMMATURE GRANULOCYTES: 1 %
LYMPHOCYTES # BLD: 15 % (ref 24–43)
Lab: NORMAL
MCH RBC QN AUTO: 40 PG (ref 25.2–33.5)
MCHC RBC AUTO-ENTMCNC: 32.3 G/DL (ref 28.4–34.8)
MCV RBC AUTO: 123.8 FL (ref 82.6–102.9)
MONOCYTES # BLD: 14 % (ref 3–12)
MORPHOLOGY: ABNORMAL
MORPHOLOGY: ABNORMAL
NRBC AUTOMATED: 0 PER 100 WBC
PDW BLD-RTO: 15.1 % (ref 11.8–14.4)
PLATELET # BLD: 170 K/UL (ref 138–453)
PLATELET ESTIMATE: ABNORMAL
PMV BLD AUTO: 9.9 FL (ref 8.1–13.5)
POTASSIUM SERPL-SCNC: 4.4 MMOL/L (ref 3.7–5.3)
RBC # BLD: 2.35 M/UL (ref 3.95–5.11)
RBC # BLD: ABNORMAL 10*6/UL
SEG NEUTROPHILS: 69 % (ref 36–65)
SEGMENTED NEUTROPHILS ABSOLUTE COUNT: 2.48 K/UL (ref 1.5–8.1)
SODIUM BLD-SCNC: 133 MMOL/L (ref 135–144)
SPECIMEN DESCRIPTION: NORMAL
WBC # BLD: 3.6 K/UL (ref 3.5–11.3)
WBC # BLD: ABNORMAL 10*3/UL

## 2019-09-07 PROCEDURE — 6370000000 HC RX 637 (ALT 250 FOR IP): Performed by: ORTHOPAEDIC SURGERY

## 2019-09-07 PROCEDURE — 80048 BASIC METABOLIC PNL TOTAL CA: CPT

## 2019-09-07 PROCEDURE — 6370000000 HC RX 637 (ALT 250 FOR IP): Performed by: INTERNAL MEDICINE

## 2019-09-07 PROCEDURE — 97116 GAIT TRAINING THERAPY: CPT

## 2019-09-07 PROCEDURE — 97110 THERAPEUTIC EXERCISES: CPT

## 2019-09-07 PROCEDURE — 99232 SBSQ HOSP IP/OBS MODERATE 35: CPT | Performed by: INTERNAL MEDICINE

## 2019-09-07 PROCEDURE — 36415 COLL VENOUS BLD VENIPUNCTURE: CPT

## 2019-09-07 PROCEDURE — 85025 COMPLETE CBC W/AUTO DIFF WBC: CPT

## 2019-09-07 PROCEDURE — 97530 THERAPEUTIC ACTIVITIES: CPT

## 2019-09-07 RX ORDER — DOCUSATE SODIUM 100 MG/1
100 CAPSULE, LIQUID FILLED ORAL 2 TIMES DAILY PRN
Status: ON HOLD | DISCHARGE
Start: 2019-09-07 | End: 2019-11-18

## 2019-09-07 RX ORDER — POLYETHYLENE GLYCOL 3350 17 G/17G
17 POWDER, FOR SOLUTION ORAL DAILY
Status: DISCONTINUED | OUTPATIENT
Start: 2019-09-07 | End: 2019-09-07 | Stop reason: HOSPADM

## 2019-09-07 RX ORDER — CEPHALEXIN 500 MG/1
500 CAPSULE ORAL 2 TIMES DAILY
DISCHARGE
Start: 2019-09-07 | End: 2019-09-12

## 2019-09-07 RX ADMIN — ACETAMINOPHEN 1000 MG: 500 TABLET ORAL at 14:52

## 2019-09-07 RX ADMIN — ESCITALOPRAM OXALATE 5 MG: 10 TABLET ORAL at 08:43

## 2019-09-07 RX ADMIN — PANTOPRAZOLE SODIUM 40 MG: 40 TABLET, DELAYED RELEASE ORAL at 06:42

## 2019-09-07 RX ADMIN — APIXABAN 2.5 MG: 2.5 TABLET, FILM COATED ORAL at 08:43

## 2019-09-07 RX ADMIN — PREDNISONE 5 MG: 5 TABLET ORAL at 08:43

## 2019-09-07 RX ADMIN — PROCHLORPERAZINE MALEATE 5 MG: 5 TABLET, FILM COATED ORAL at 08:46

## 2019-09-07 RX ADMIN — ACETAMINOPHEN 1000 MG: 500 TABLET ORAL at 06:42

## 2019-09-07 RX ADMIN — OXYCODONE HYDROCHLORIDE 5 MG: 5 TABLET ORAL at 00:55

## 2019-09-07 RX ADMIN — PROPRANOLOL HYDROCHLORIDE 10 MG: 10 TABLET ORAL at 08:44

## 2019-09-07 RX ADMIN — POLYETHYLENE GLYCOL 3350 17 G: 17 POWDER, FOR SOLUTION ORAL at 10:21

## 2019-09-07 ASSESSMENT — PAIN DESCRIPTION - DESCRIPTORS
DESCRIPTORS: ACHING
DESCRIPTORS: CONSTANT

## 2019-09-07 ASSESSMENT — PAIN SCALES - GENERAL
PAINLEVEL_OUTOF10: 4
PAINLEVEL_OUTOF10: 4
PAINLEVEL_OUTOF10: 6
PAINLEVEL_OUTOF10: 6
PAINLEVEL_OUTOF10: 3
PAINLEVEL_OUTOF10: 6

## 2019-09-07 ASSESSMENT — PAIN DESCRIPTION - ONSET
ONSET: ON-GOING
ONSET: ON-GOING

## 2019-09-07 ASSESSMENT — PAIN DESCRIPTION - FREQUENCY
FREQUENCY: CONTINUOUS
FREQUENCY: CONTINUOUS

## 2019-09-07 ASSESSMENT — PAIN DESCRIPTION - ORIENTATION
ORIENTATION: RIGHT
ORIENTATION: RIGHT

## 2019-09-07 ASSESSMENT — PAIN DESCRIPTION - PAIN TYPE
TYPE: SURGICAL PAIN;ACUTE PAIN
TYPE: ACUTE PAIN;SURGICAL PAIN

## 2019-09-07 ASSESSMENT — PAIN - FUNCTIONAL ASSESSMENT: PAIN_FUNCTIONAL_ASSESSMENT: ACTIVITIES ARE NOT PREVENTED

## 2019-09-07 ASSESSMENT — PAIN DESCRIPTION - LOCATION
LOCATION: HIP
LOCATION: HIP

## 2019-09-07 ASSESSMENT — PAIN DESCRIPTION - PROGRESSION: CLINICAL_PROGRESSION: NOT CHANGED

## 2019-09-07 NOTE — PROGRESS NOTES
Physical Therapy  Facility/Department: [address] ORTHO/MED SURG  Daily Treatment Note  NAME: Luisito Harvey  : 1934  MRN: [id number]    Date of Service: 2019    Discharge Recommendations:  Patient would benefit from continued therapy after discharge        Assessment   Body structures, Functions, Activity limitations: Decreased functional mobility ; Decreased balance;Decreased strength;Decreased endurance;Decreased high-level IADLs  Assessment: Improved ambulation distance this AM to 80 feet. Mod assit for bed mobility. PT would benefit from continued PT upon discharge. Prognosis: Good  PT Education: Home Exercise Program;General Safety;Transfer Training;Gait Training;Functional Mobility Training  Patient Education: hip precautions  Activity Tolerance  Activity Tolerance: Patient Tolerated treatment well     Patient Diagnosis(es): The encounter diagnosis was History of right hip hemiarthroplasty. has a past medical history of Anxiousness, Blood clot in vein, Depressed, High cholesterol, History of appetite changes, Invasive ductal carcinoma of breast ([facility] Lymphedema, Nausea & vomiting, Nervous, Wears glasses, and Weight gain. has a past surgical history that includes Tonsillectomy and adenoidectomy (Bilateral, ); Appendectomy (2000); Cataract removal with implant (Right, 2016); Cataract removal with implant (Left, 2016); Cataract removal with implant (Left, 2015); hip surgery (Right, 2019); Femur fracture surgery (Right, 2019); Hardware Removal (Right, 2019); Total hip arthroplasty (Right, 2019); REMOVE HARDWARE HIP (Right, 2019); and Total hip arthroplasty (Right, 2019).     Restrictions  Restrictions/Precautions  Restrictions/Precautions: Fall Risk, Weight Bearing  Required Braces or Orthoses?: No  Lower Extremity Weight Bearing Restrictions  Right Lower Extremity Weight Bearing: Weight Bearing As Tolerated  Position Activity

## 2019-09-07 NOTE — PROGRESS NOTES
reduction in the size of her lymph nodes. She feels well and is tolerating the treatment. She describes another medication that she is taking which is a detoxification pill I do not know what to make of this and I have encouraged her to share my address with her homeopath I would like them to be able to communicate her progress with me. Post-op pain 9/5/2019 Yes    Bone metastasis ([address] 9/5/2019 Yes    Closed nondisplaced intertrochanteric fracture of right femur ([facility] [address] 9/5/2019 Yes    Overview Signed 3/8/2019  1:00 PM by ELIGIO Irvin     Overview:   Related to metastatic disease-status post intramedullary nail January 2019-Dr. Theodore at [address] [facility]. Dyslipidemia 9/5/2019 Yes    Systemic lupus erythematosus ([facility] [address] 9/5/2019 Yes    Overview Signed 3/8/2019  1:00 PM by ELIGIO Irvin     Overview:   Began in the fall of 2017. Symmetrical distal distribution involving metatarsal phalangeal proximal interphalangeal joints and the wrist bones. Also in the elbows shoulders and to some extent in the knees. Not particularly present in the feet. She has characteristic serology including low complement elevated double-stranded DNA severely elevated anti nuclear antibody and proteinuria. She has been treated successfully with prednisone. Her current rheumatologist is Dr. Lynne Martinez. He has been somewhat reluctant to be overly aggressive about immune modulating treatment on the basis of the fact that she has advanced breast cancer. Arthritis of right hip 9/5/2019 Yes    Other pulmonary embolism without acute cor pulmonale ([facility] [address] 9/5/2019 Yes      UTI, acute cystitis without hematuria not related to Villalta catheter    Plan:        1. Urinary retention has resolved, appreciate urology input   2. Discussed with patient's daughter and she says that patient takes Eliquis 2.5 mg twice daily at home, we will continue that on discharge   3.  Keflex for UTI  4. postop management

## 2019-09-11 ENCOUNTER — CARE COORDINATION (OUTPATIENT)
Dept: CASE MANAGEMENT | Age: 84
End: 2019-09-11

## 2019-09-14 ENCOUNTER — CARE COORDINATION (OUTPATIENT)
Dept: CASE MANAGEMENT | Age: 84
End: 2019-09-14

## 2019-09-16 ENCOUNTER — CARE COORDINATION (OUTPATIENT)
Dept: CASE MANAGEMENT | Age: 84
End: 2019-09-16

## 2019-09-18 ENCOUNTER — CARE COORDINATION (OUTPATIENT)
Dept: CASE MANAGEMENT | Age: 84
End: 2019-09-18

## 2019-09-21 ENCOUNTER — CARE COORDINATION (OUTPATIENT)
Dept: CASE MANAGEMENT | Age: 84
End: 2019-09-21

## 2019-09-21 NOTE — CARE COORDINATION
[facility] Transitions Initial Follow Up Call    Call within 2 business days of discharge: Yes    Patient: Judy Kearney Patient : 1934   MRN: [id number]  Reason for Admission:   Discharge Date: 19 RARS: Readmission Risk Score: 32      Last Discharge [facility]       Complaint Diagnosis Description Type Department Provider    19  History of right hip hemiarthroplasty Admission (Discharged) Kevin San MD           Spoke with: Todd Rosario I (Aditya Delgado) who is very vague with information. After introducing myself her reply was, \"Oh, you have to track the money\". I asked if I could speak to [facility] and she hesitated and said she was not available. Asked how she was doing, if she had everything she needs, if she was home with home health and she replied, \"We are taking care of her\". Facility: Discharged from [address] [place] [address] Rehab [facility] with [facility]. Unable to complete medication review.       Follow Up  Future Appointments   Date Time Provider Gwen Fountain   2019 10:40 AM Star Cade MD Sports Med Reynaldo Winters RN

## 2019-09-24 ENCOUNTER — OFFICE VISIT (OUTPATIENT)
Dept: ORTHOPEDIC SURGERY | Age: 84
End: 2019-09-24

## 2019-09-24 VITALS
SYSTOLIC BLOOD PRESSURE: 116 MMHG | HEART RATE: 80 BPM | DIASTOLIC BLOOD PRESSURE: 78 MMHG | WEIGHT: 120 LBS | HEIGHT: 65 IN | RESPIRATION RATE: 20 BRPM | BODY MASS INDEX: 19.99 KG/M2

## 2019-09-24 DIAGNOSIS — S72.044D CLOSED NONDISPLACED BASICERVICAL FRACTURE OF RIGHT FEMUR WITH ROUTINE HEALING, SUBSEQUENT ENCOUNTER: Primary | ICD-10-CM

## 2019-09-24 PROCEDURE — 99024 POSTOP FOLLOW-UP VISIT: CPT | Performed by: ORTHOPAEDIC SURGERY

## 2019-09-24 NOTE — PROGRESS NOTES
Subjective:      Patient ID: Carmelita Sousa is a 80 y.o. female. HPI  The patient is status post hemiarthroplasty right hip. Having a little bit of lateral hip pain but otherwise walking well. Current Outpatient Medications   Medication Sig Dispense Refill    docusate sodium (COLACE) 100 MG capsule Take 1 capsule by mouth 2 times daily as needed for Constipation      apixaban (ELIQUIS) 2.5 MG TABS tablet Take 1 tablet by mouth 2 times daily      NONFORMULARY Take 2 tablets by mouth daily Indications: MAX 3      NONFORMULARY Take 1 tablet by mouth 2 times daily Indications: methyl cpg      LUTEIN PO Take 1 tablet by mouth daily      NONFORMULARY Take 1 tablet by mouth daily Indications: align      Multiple Vitamins-Minerals (B COMPLEX VITAMINS PLUS PO) Take 1 tablet by mouth 3 times daily      NONFORMULARY Take 2 tablets by mouth 2 times daily Indications: cytoquel      NONFORMULARY Take 1 tablet by mouth daily Indications: H2 absorb      NONFORMULARY Take 1 tablet by mouth daily Indications: ubiquinol      NONFORMULARY Take 1 tablet by mouth daily Indications: iodoral      NONFORMULARY Take 1 tablet by mouth three times a week Indications: 4 life transfer/ mon wed/ friday      NONFORMULARY Take 3 tablets by mouth four times a week Indications: sun tues thurs sat/ immune assist      NONFORMULARY Take 1 tablet by mouth daily Indications: megaspore      NONFORMULARY Take 1 tablet by mouth daily Indications: bio dent      NONFORMULARY Take 2 tablets by mouth daily Indications: pc by body bio      NONFORMULARY Take 1 tablet by mouth daily Indications: ldn naltrexone 4.5mg      NONFORMULARY Take 2 tablets by mouth daily Indications: metatrol      escitalopram (LEXAPRO) 10 MG tablet Take 5 mg by mouth 2 times daily       fentaNYL (DURAGESIC) 25 MCG/HR Place 1 patch onto the skin every 72 hours.  Indications: 50mcg       melatonin 1 MG tablet Take 3 tablets by mouth nightly as needed for Sleep 30

## 2019-09-25 ENCOUNTER — CARE COORDINATION (OUTPATIENT)
Dept: CASE MANAGEMENT | Age: 84
End: 2019-09-25

## 2019-09-25 NOTE — CARE COORDINATION
[facility] Transitions Joint Bundle Follow Up Call    2019      Patient Name: Adelita Barnett         Patient : 1934     Discharge Date: 19    RARS: Readmission Risk Score: Readmission Risk Score: 32    PCP: Mark Alonzo MD                   All calls go to daughter, Nilda Kilgore. Call place to her, no answer, mail box is full. No future appointments.

## 2019-10-07 ENCOUNTER — CARE COORDINATION (OUTPATIENT)
Dept: CASE MANAGEMENT | Age: 84
End: 2019-10-07

## 2019-11-01 ENCOUNTER — HOSPITAL ENCOUNTER (OUTPATIENT)
Dept: INFUSION THERAPY | Age: 84
Discharge: HOME | End: 2019-11-01
Payer: MEDICARE

## 2019-11-01 VITALS
HEART RATE: 62 BPM | TEMPERATURE: 97.4 F | DIASTOLIC BLOOD PRESSURE: 70 MMHG | SYSTOLIC BLOOD PRESSURE: 118 MMHG | RESPIRATION RATE: 16 BRPM

## 2019-11-01 DIAGNOSIS — Z17.0 MALIGNANT NEOPLASM OF UPPER-OUTER QUADRANT OF RIGHT BREAST IN FEMALE, ESTROGEN RECEPTOR POSITIVE (HCC): ICD-10-CM

## 2019-11-01 DIAGNOSIS — C50.411 MALIGNANT NEOPLASM OF UPPER-OUTER QUADRANT OF RIGHT BREAST IN FEMALE, ESTROGEN RECEPTOR POSITIVE (HCC): ICD-10-CM

## 2019-11-01 DIAGNOSIS — D89.9 DISORDER INVOLVING IMMUNE MECHANISM (HCC): Primary | ICD-10-CM

## 2019-11-01 PROCEDURE — 96366 THER/PROPH/DIAG IV INF ADDON: CPT

## 2019-11-01 PROCEDURE — 2500000003 HC RX 250 WO HCPCS: Performed by: INTERNAL MEDICINE

## 2019-11-01 PROCEDURE — 96365 THER/PROPH/DIAG IV INF INIT: CPT

## 2019-11-01 RX ORDER — SODIUM CHLORIDE 0.9 % (FLUSH) 0.9 %
10 SYRINGE (ML) INJECTION PRN
Status: CANCELLED | OUTPATIENT
Start: 2019-11-08

## 2019-11-01 RX ORDER — SODIUM CHLORIDE 9 MG/ML
INJECTION, SOLUTION INTRAVENOUS ONCE
Status: DISCONTINUED | OUTPATIENT
Start: 2019-11-01 | End: 2019-11-02 | Stop reason: HOSPADM

## 2019-11-01 RX ORDER — SODIUM CHLORIDE 9 MG/ML
INJECTION, SOLUTION INTRAVENOUS ONCE
Status: CANCELLED | OUTPATIENT
Start: 2019-11-08

## 2019-11-01 RX ADMIN — MAGNESIUM CHLORIDE: 200 INJECTION INTRAVENOUS at 11:07

## 2019-11-01 NOTE — PROGRESS NOTES
Pt here for Vit C and Mag infusion. Pt was treated without incident and discharged in stable condition. Pt will return in 1 week for next infusion.

## 2019-11-06 ENCOUNTER — CARE COORDINATION (OUTPATIENT)
Dept: CASE MANAGEMENT | Age: 84
End: 2019-11-06

## 2019-11-18 ENCOUNTER — APPOINTMENT (OUTPATIENT)
Dept: GENERAL RADIOLOGY | Age: 84
DRG: 280 | End: 2019-11-18
Payer: MEDICARE

## 2019-11-18 ENCOUNTER — HOSPITAL ENCOUNTER (INPATIENT)
Age: 84
LOS: 7 days | Discharge: HOME | DRG: 280 | End: 2019-11-25
Attending: EMERGENCY MEDICINE | Admitting: INTERNAL MEDICINE
Payer: MEDICARE

## 2019-11-18 DIAGNOSIS — J81.0 ACUTE PULMONARY EDEMA (HCC): Primary | ICD-10-CM

## 2019-11-18 PROBLEM — E06.3 HYPOTHYROIDISM DUE TO HASHIMOTO'S THYROIDITIS: Chronic | Status: ACTIVE | Noted: 2018-02-22

## 2019-11-18 PROBLEM — C79.51 BONE METASTASIS (HCC): Chronic | Status: ACTIVE | Noted: 2019-02-05

## 2019-11-18 PROBLEM — E03.8 HYPOTHYROIDISM DUE TO HASHIMOTO'S THYROIDITIS: Chronic | Status: ACTIVE | Noted: 2018-02-22

## 2019-11-18 PROBLEM — I50.1 PULMONARY EDEMA CARDIAC CAUSE (HCC): Status: ACTIVE | Noted: 2019-11-18

## 2019-11-18 PROBLEM — M32.9 SYSTEMIC LUPUS ERYTHEMATOSUS (HCC): Chronic | Status: ACTIVE | Noted: 2018-02-08

## 2019-11-18 PROBLEM — R79.89 ELEVATED BRAIN NATRIURETIC PEPTIDE (BNP) LEVEL: Status: ACTIVE | Noted: 2019-11-18

## 2019-11-18 PROBLEM — R77.8 TROPONIN LEVEL ELEVATED: Status: ACTIVE | Noted: 2019-11-18

## 2019-11-18 PROBLEM — N17.9 AKI (ACUTE KIDNEY INJURY) (HCC): Status: ACTIVE | Noted: 2019-11-18

## 2019-11-18 PROBLEM — Z86.711 HISTORY OF PULMONARY EMBOLUS (PE): Status: ACTIVE | Noted: 2019-11-18

## 2019-11-18 PROBLEM — Z86.711 HISTORY OF PULMONARY EMBOLUS (PE): Chronic | Status: ACTIVE | Noted: 2019-11-18

## 2019-11-18 LAB
ABSOLUTE EOS #: 0.04 K/UL (ref 0–0.4)
ABSOLUTE IMMATURE GRANULOCYTE: 0.04 K/UL (ref 0–0.3)
ABSOLUTE LYMPH #: 1.16 K/UL (ref 1–4.8)
ABSOLUTE MONO #: 0.17 K/UL (ref 0.2–0.8)
ANION GAP SERPL CALCULATED.3IONS-SCNC: 13 MMOL/L (ref 9–17)
ANION GAP SERPL CALCULATED.3IONS-SCNC: 15 MMOL/L (ref 9–17)
BASOPHILS # BLD: 1 %
BASOPHILS ABSOLUTE: 0.04 K/UL (ref 0–0.2)
BNP INTERPRETATION: ABNORMAL
BUN BLDV-MCNC: 30 MG/DL (ref 8–23)
BUN BLDV-MCNC: 32 MG/DL (ref 8–23)
BUN/CREAT BLD: 23 (ref 9–20)
BUN/CREAT BLD: 26 (ref 9–20)
CALCIUM SERPL-MCNC: 9.2 MG/DL (ref 8.6–10.4)
CALCIUM SERPL-MCNC: 9.4 MG/DL (ref 8.6–10.4)
CHLORIDE BLD-SCNC: 98 MMOL/L (ref 98–107)
CHLORIDE BLD-SCNC: 98 MMOL/L (ref 98–107)
CO2: 22 MMOL/L (ref 20–31)
CO2: 24 MMOL/L (ref 20–31)
CREAT SERPL-MCNC: 1.21 MG/DL (ref 0.5–0.9)
CREAT SERPL-MCNC: 1.32 MG/DL (ref 0.5–0.9)
D-DIMER QUANTITATIVE: 3.66 MG/L FEU
DIFFERENTIAL TYPE: ABNORMAL
EOSINOPHILS RELATIVE PERCENT: 1 % (ref 1–4)
GFR AFRICAN AMERICAN: 46 ML/MIN
GFR AFRICAN AMERICAN: 51 ML/MIN
GFR NON-AFRICAN AMERICAN: 38 ML/MIN
GFR NON-AFRICAN AMERICAN: 42 ML/MIN
GFR SERPL CREATININE-BSD FRML MDRD: ABNORMAL ML/MIN/{1.73_M2}
GLUCOSE BLD-MCNC: 125 MG/DL (ref 70–99)
GLUCOSE BLD-MCNC: 137 MG/DL (ref 70–99)
HCT VFR BLD CALC: 41.5 % (ref 36.3–47.1)
HEMOGLOBIN: 13.5 G/DL (ref 11.9–15.1)
IMMATURE GRANULOCYTES: 1 %
LV EF: 23 %
LVEF MODALITY: NORMAL
LYMPHOCYTES # BLD: 27 % (ref 24–44)
MCH RBC QN AUTO: 39.7 PG (ref 25.2–33.5)
MCHC RBC AUTO-ENTMCNC: 32.5 G/DL (ref 28.4–34.8)
MCV RBC AUTO: 122.1 FL (ref 82.6–102.9)
MONOCYTES # BLD: 4 % (ref 1–7)
MORPHOLOGY: ABNORMAL
MYOGLOBIN: 52 NG/ML (ref 25–58)
MYOGLOBIN: 57 NG/ML (ref 25–58)
NRBC AUTOMATED: 0 PER 100 WBC
PARTIAL THROMBOPLASTIN TIME: 24.6 SEC (ref 23–31)
PARTIAL THROMBOPLASTIN TIME: 60.2 SEC (ref 23–31)
PARTIAL THROMBOPLASTIN TIME: 74.9 SEC (ref 23–31)
PDW BLD-RTO: 13.2 % (ref 11.8–14.4)
PLATELET # BLD: 214 K/UL (ref 138–453)
PLATELET ESTIMATE: ABNORMAL
PMV BLD AUTO: 8.6 FL (ref 8.1–13.5)
POTASSIUM SERPL-SCNC: 4.4 MMOL/L (ref 3.7–5.3)
POTASSIUM SERPL-SCNC: 4.5 MMOL/L (ref 3.7–5.3)
PRO-BNP: ABNORMAL PG/ML
RBC # BLD: 3.4 M/UL (ref 3.95–5.11)
RBC # BLD: ABNORMAL 10*6/UL
SEG NEUTROPHILS: 66 % (ref 36–66)
SEGMENTED NEUTROPHILS ABSOLUTE COUNT: 2.85 K/UL (ref 1.8–7.7)
SODIUM BLD-SCNC: 135 MMOL/L (ref 135–144)
SODIUM BLD-SCNC: 135 MMOL/L (ref 135–144)
TROPONIN INTERP: ABNORMAL
TROPONIN T: ABNORMAL NG/ML
TROPONIN, HIGH SENSITIVITY: 183 NG/L (ref 0–14)
TROPONIN, HIGH SENSITIVITY: 187 NG/L (ref 0–14)
TROPONIN, HIGH SENSITIVITY: 209 NG/L (ref 0–14)
WBC # BLD: 4.3 K/UL (ref 3.5–11.3)
WBC # BLD: ABNORMAL 10*3/UL

## 2019-11-18 PROCEDURE — 6360000002 HC RX W HCPCS: Performed by: EMERGENCY MEDICINE

## 2019-11-18 PROCEDURE — 36415 COLL VENOUS BLD VENIPUNCTURE: CPT

## 2019-11-18 PROCEDURE — 6370000000 HC RX 637 (ALT 250 FOR IP): Performed by: NURSE PRACTITIONER

## 2019-11-18 PROCEDURE — 87088 URINE BACTERIA CULTURE: CPT

## 2019-11-18 PROCEDURE — 99285 EMERGENCY DEPT VISIT HI MDM: CPT

## 2019-11-18 PROCEDURE — 71045 X-RAY EXAM CHEST 1 VIEW: CPT

## 2019-11-18 PROCEDURE — 51702 INSERT TEMP BLADDER CATH: CPT

## 2019-11-18 PROCEDURE — 2580000003 HC RX 258: Performed by: NURSE PRACTITIONER

## 2019-11-18 PROCEDURE — 96374 THER/PROPH/DIAG INJ IV PUSH: CPT

## 2019-11-18 PROCEDURE — 93306 TTE W/DOPPLER COMPLETE: CPT

## 2019-11-18 PROCEDURE — 84484 ASSAY OF TROPONIN QUANT: CPT

## 2019-11-18 PROCEDURE — 83874 ASSAY OF MYOGLOBIN: CPT

## 2019-11-18 PROCEDURE — 6360000002 HC RX W HCPCS: Performed by: NURSE PRACTITIONER

## 2019-11-18 PROCEDURE — 93005 ELECTROCARDIOGRAM TRACING: CPT | Performed by: EMERGENCY MEDICINE

## 2019-11-18 PROCEDURE — 85379 FIBRIN DEGRADATION QUANT: CPT

## 2019-11-18 PROCEDURE — 85025 COMPLETE CBC W/AUTO DIFF WBC: CPT

## 2019-11-18 PROCEDURE — 2000000000 HC ICU R&B

## 2019-11-18 PROCEDURE — 94660 CPAP INITIATION&MGMT: CPT

## 2019-11-18 PROCEDURE — APPSS180 APP SPLIT SHARED TIME > 60 MINUTES: Performed by: NURSE PRACTITIONER

## 2019-11-18 PROCEDURE — 99223 1ST HOSP IP/OBS HIGH 75: CPT | Performed by: INTERNAL MEDICINE

## 2019-11-18 PROCEDURE — 80048 BASIC METABOLIC PNL TOTAL CA: CPT

## 2019-11-18 PROCEDURE — 94761 N-INVAS EAR/PLS OXIMETRY MLT: CPT

## 2019-11-18 PROCEDURE — 87186 SC STD MICRODIL/AGAR DIL: CPT

## 2019-11-18 PROCEDURE — 83880 ASSAY OF NATRIURETIC PEPTIDE: CPT

## 2019-11-18 PROCEDURE — 85730 THROMBOPLASTIN TIME PARTIAL: CPT

## 2019-11-18 PROCEDURE — 87086 URINE CULTURE/COLONY COUNT: CPT

## 2019-11-18 PROCEDURE — 2700000000 HC OXYGEN THERAPY PER DAY

## 2019-11-18 PROCEDURE — 94640 AIRWAY INHALATION TREATMENT: CPT

## 2019-11-18 RX ORDER — ALBUTEROL SULFATE 90 UG/1
2 AEROSOL, METERED RESPIRATORY (INHALATION)
Status: DISCONTINUED | OUTPATIENT
Start: 2019-11-18 | End: 2019-11-18

## 2019-11-18 RX ORDER — PROCHLORPERAZINE MALEATE 5 MG/1
5 TABLET ORAL 2 TIMES DAILY
Status: DISCONTINUED | OUTPATIENT
Start: 2019-11-18 | End: 2019-11-25

## 2019-11-18 RX ORDER — HEPARIN SODIUM 1000 [USP'U]/ML
60 INJECTION, SOLUTION INTRAVENOUS; SUBCUTANEOUS ONCE
Status: DISCONTINUED | OUTPATIENT
Start: 2019-11-18 | End: 2019-11-18 | Stop reason: SDUPTHER

## 2019-11-18 RX ORDER — ONDANSETRON 2 MG/ML
4 INJECTION INTRAMUSCULAR; INTRAVENOUS EVERY 6 HOURS PRN
Status: DISCONTINUED | OUTPATIENT
Start: 2019-11-18 | End: 2019-11-25 | Stop reason: HOSPADM

## 2019-11-18 RX ORDER — ONDANSETRON 4 MG/1
4 TABLET, ORALLY DISINTEGRATING ORAL EVERY 6 HOURS PRN
Status: DISCONTINUED | OUTPATIENT
Start: 2019-11-18 | End: 2019-11-25 | Stop reason: HOSPADM

## 2019-11-18 RX ORDER — OXYCODONE HYDROCHLORIDE AND ACETAMINOPHEN 5; 325 MG/1; MG/1
0.5 TABLET ORAL EVERY 6 HOURS PRN
Status: ON HOLD | COMMUNITY
End: 2019-11-18

## 2019-11-18 RX ORDER — ALBUTEROL SULFATE 2.5 MG/3ML
5 SOLUTION RESPIRATORY (INHALATION)
Status: DISCONTINUED | OUTPATIENT
Start: 2019-11-18 | End: 2019-11-18

## 2019-11-18 RX ORDER — EXEMESTANE 25 MG/1
25 TABLET ORAL DAILY
Status: DISCONTINUED | OUTPATIENT
Start: 2019-11-18 | End: 2019-11-25 | Stop reason: HOSPADM

## 2019-11-18 RX ORDER — NICOTINE 21 MG/24HR
1 PATCH, TRANSDERMAL 24 HOURS TRANSDERMAL DAILY PRN
Status: DISCONTINUED | OUTPATIENT
Start: 2019-11-18 | End: 2019-11-25 | Stop reason: HOSPADM

## 2019-11-18 RX ORDER — HEPARIN SODIUM 1000 [USP'U]/ML
60 INJECTION, SOLUTION INTRAVENOUS; SUBCUTANEOUS PRN
Status: DISCONTINUED | OUTPATIENT
Start: 2019-11-18 | End: 2019-11-18 | Stop reason: SDUPTHER

## 2019-11-18 RX ORDER — HEPARIN SODIUM 1000 [USP'U]/ML
60 INJECTION, SOLUTION INTRAVENOUS; SUBCUTANEOUS PRN
Status: DISCONTINUED | OUTPATIENT
Start: 2019-11-18 | End: 2019-11-20

## 2019-11-18 RX ORDER — FUROSEMIDE 10 MG/ML
40 INJECTION INTRAMUSCULAR; INTRAVENOUS 2 TIMES DAILY
Status: DISCONTINUED | OUTPATIENT
Start: 2019-11-18 | End: 2019-11-21

## 2019-11-18 RX ORDER — HEPARIN SODIUM 1000 [USP'U]/ML
60 INJECTION, SOLUTION INTRAVENOUS; SUBCUTANEOUS ONCE
Status: COMPLETED | OUTPATIENT
Start: 2019-11-18 | End: 2019-11-18

## 2019-11-18 RX ORDER — IPRATROPIUM BROMIDE AND ALBUTEROL SULFATE 2.5; .5 MG/3ML; MG/3ML
1 SOLUTION RESPIRATORY (INHALATION)
Status: DISCONTINUED | OUTPATIENT
Start: 2019-11-18 | End: 2019-11-18

## 2019-11-18 RX ORDER — ALBUTEROL SULFATE 2.5 MG/3ML
2.5 SOLUTION RESPIRATORY (INHALATION)
Status: DISCONTINUED | OUTPATIENT
Start: 2019-11-18 | End: 2019-11-25 | Stop reason: HOSPADM

## 2019-11-18 RX ORDER — FUROSEMIDE 10 MG/ML
40 INJECTION INTRAMUSCULAR; INTRAVENOUS ONCE
Status: COMPLETED | OUTPATIENT
Start: 2019-11-18 | End: 2019-11-18

## 2019-11-18 RX ORDER — PREDNISONE 1 MG/1
5 TABLET ORAL DAILY
Status: DISCONTINUED | OUTPATIENT
Start: 2019-11-18 | End: 2019-11-25 | Stop reason: HOSPADM

## 2019-11-18 RX ORDER — PROPRANOLOL HYDROCHLORIDE 10 MG/1
10 TABLET ORAL DAILY
Status: DISCONTINUED | OUTPATIENT
Start: 2019-11-18 | End: 2019-11-19

## 2019-11-18 RX ORDER — UREA 10 %
3 LOTION (ML) TOPICAL NIGHTLY
COMMUNITY

## 2019-11-18 RX ORDER — FENTANYL 25 UG/H
1 PATCH TRANSDERMAL
Status: DISCONTINUED | OUTPATIENT
Start: 2019-11-18 | End: 2019-11-18

## 2019-11-18 RX ORDER — HEPARIN SODIUM 1000 [USP'U]/ML
30 INJECTION, SOLUTION INTRAVENOUS; SUBCUTANEOUS PRN
Status: DISCONTINUED | OUTPATIENT
Start: 2019-11-18 | End: 2019-11-18 | Stop reason: SDUPTHER

## 2019-11-18 RX ORDER — HEPARIN SODIUM 10000 [USP'U]/100ML
12 INJECTION, SOLUTION INTRAVENOUS CONTINUOUS
Status: DISCONTINUED | OUTPATIENT
Start: 2019-11-18 | End: 2019-11-18 | Stop reason: SDUPTHER

## 2019-11-18 RX ORDER — ONDANSETRON 2 MG/ML
4 INJECTION INTRAMUSCULAR; INTRAVENOUS EVERY 6 HOURS PRN
Status: DISCONTINUED | OUTPATIENT
Start: 2019-11-18 | End: 2019-11-18 | Stop reason: SDUPTHER

## 2019-11-18 RX ORDER — DOCUSATE SODIUM 100 MG/1
100 CAPSULE, LIQUID FILLED ORAL 2 TIMES DAILY PRN
Status: DISCONTINUED | OUTPATIENT
Start: 2019-11-18 | End: 2019-11-25 | Stop reason: HOSPADM

## 2019-11-18 RX ORDER — ESCITALOPRAM OXALATE 10 MG/1
5 TABLET ORAL 2 TIMES DAILY
Status: DISCONTINUED | OUTPATIENT
Start: 2019-11-18 | End: 2019-11-25 | Stop reason: HOSPADM

## 2019-11-18 RX ORDER — HEPARIN SODIUM 10000 [USP'U]/100ML
12 INJECTION, SOLUTION INTRAVENOUS CONTINUOUS
Status: DISCONTINUED | OUTPATIENT
Start: 2019-11-18 | End: 2019-11-20

## 2019-11-18 RX ORDER — EXEMESTANE 25 MG/1
25 TABLET ORAL EVERY MORNING
COMMUNITY

## 2019-11-18 RX ORDER — PANTOPRAZOLE SODIUM 40 MG/1
40 TABLET, DELAYED RELEASE ORAL
Status: DISCONTINUED | OUTPATIENT
Start: 2019-11-18 | End: 2019-11-25 | Stop reason: HOSPADM

## 2019-11-18 RX ORDER — HEPARIN SODIUM 1000 [USP'U]/ML
30 INJECTION, SOLUTION INTRAVENOUS; SUBCUTANEOUS PRN
Status: DISCONTINUED | OUTPATIENT
Start: 2019-11-18 | End: 2019-11-20

## 2019-11-18 RX ORDER — SODIUM CHLORIDE 0.9 % (FLUSH) 0.9 %
10 SYRINGE (ML) INJECTION PRN
Status: DISCONTINUED | OUTPATIENT
Start: 2019-11-18 | End: 2019-11-25 | Stop reason: HOSPADM

## 2019-11-18 RX ORDER — ACETAMINOPHEN 325 MG/1
650 TABLET ORAL EVERY 4 HOURS PRN
Status: DISCONTINUED | OUTPATIENT
Start: 2019-11-18 | End: 2019-11-25 | Stop reason: HOSPADM

## 2019-11-18 RX ORDER — UREA 10 %
1 LOTION (ML) TOPICAL DAILY
COMMUNITY

## 2019-11-18 RX ORDER — IPRATROPIUM BROMIDE AND ALBUTEROL SULFATE 2.5; .5 MG/3ML; MG/3ML
1 SOLUTION RESPIRATORY (INHALATION) 4 TIMES DAILY
Status: DISCONTINUED | OUTPATIENT
Start: 2019-11-18 | End: 2019-11-20

## 2019-11-18 RX ORDER — SODIUM CHLORIDE 0.9 % (FLUSH) 0.9 %
10 SYRINGE (ML) INJECTION EVERY 12 HOURS SCHEDULED
Status: DISCONTINUED | OUTPATIENT
Start: 2019-11-18 | End: 2019-11-25 | Stop reason: HOSPADM

## 2019-11-18 RX ORDER — LANOLIN ALCOHOL/MO/W.PET/CERES
3 CREAM (GRAM) TOPICAL NIGHTLY PRN
Status: DISCONTINUED | OUTPATIENT
Start: 2019-11-18 | End: 2019-11-25 | Stop reason: HOSPADM

## 2019-11-18 RX ADMIN — PROCHLORPERAZINE MALEATE 5 MG: 5 TABLET, FILM COATED ORAL at 21:19

## 2019-11-18 RX ADMIN — ESCITALOPRAM OXALATE 5 MG: 10 TABLET ORAL at 10:14

## 2019-11-18 RX ADMIN — IPRATROPIUM BROMIDE AND ALBUTEROL SULFATE 1 AMPULE: .5; 3 SOLUTION RESPIRATORY (INHALATION) at 22:11

## 2019-11-18 RX ADMIN — PREDNISONE 5 MG: 5 TABLET ORAL at 10:15

## 2019-11-18 RX ADMIN — ESCITALOPRAM OXALATE 5 MG: 10 TABLET ORAL at 21:19

## 2019-11-18 RX ADMIN — SODIUM CHLORIDE, PRESERVATIVE FREE 10 ML: 5 INJECTION INTRAVENOUS at 21:24

## 2019-11-18 RX ADMIN — ALBUTEROL SULFATE 5 MG: 5 SOLUTION RESPIRATORY (INHALATION) at 02:47

## 2019-11-18 RX ADMIN — HEPARIN SODIUM 3260 UNITS: 1000 INJECTION INTRAVENOUS; SUBCUTANEOUS at 04:08

## 2019-11-18 RX ADMIN — IPRATROPIUM BROMIDE AND ALBUTEROL SULFATE 1 AMPULE: .5; 3 SOLUTION RESPIRATORY (INHALATION) at 16:58

## 2019-11-18 RX ADMIN — PROPRANOLOL HYDROCHLORIDE 10 MG: 10 TABLET ORAL at 21:19

## 2019-11-18 RX ADMIN — FUROSEMIDE 40 MG: 10 INJECTION, SOLUTION INTRAMUSCULAR; INTRAVENOUS at 03:17

## 2019-11-18 RX ADMIN — PANTOPRAZOLE SODIUM 40 MG: 40 TABLET, DELAYED RELEASE ORAL at 08:05

## 2019-11-18 RX ADMIN — HEPARIN SODIUM AND DEXTROSE 12 UNITS/KG/HR: 10000; 5 INJECTION INTRAVENOUS at 04:08

## 2019-11-18 RX ADMIN — EXEMESTANE 25 MG: 25 TABLET ORAL at 10:15

## 2019-11-18 RX ADMIN — FUROSEMIDE 40 MG: 10 INJECTION, SOLUTION INTRAMUSCULAR; INTRAVENOUS at 18:23

## 2019-11-18 RX ADMIN — IPRATROPIUM BROMIDE AND ALBUTEROL SULFATE 1 AMPULE: .5; 3 SOLUTION RESPIRATORY (INHALATION) at 08:40

## 2019-11-18 ASSESSMENT — ENCOUNTER SYMPTOMS
VOMITING: 0
COLOR CHANGE: 0
FACIAL SWELLING: 0
COUGH: 0
CONSTIPATION: 0
EYE DISCHARGE: 0
EYE REDNESS: 0
SHORTNESS OF BREATH: 1
ABDOMINAL PAIN: 0
DIARRHEA: 0

## 2019-11-19 ENCOUNTER — APPOINTMENT (OUTPATIENT)
Dept: GENERAL RADIOLOGY | Age: 84
DRG: 280 | End: 2019-11-19
Payer: MEDICARE

## 2019-11-19 PROBLEM — R91.1 PULMONARY NODULE: Status: ACTIVE | Noted: 2019-11-19

## 2019-11-19 PROBLEM — I25.5 ISCHEMIC CARDIOMYOPATHY: Status: ACTIVE | Noted: 2019-11-19

## 2019-11-19 PROBLEM — I50.21 ACUTE SYSTOLIC HEART FAILURE (HCC): Status: ACTIVE | Noted: 2019-11-19

## 2019-11-19 PROBLEM — I50.1: Status: ACTIVE | Noted: 2019-11-19

## 2019-11-19 PROBLEM — I25.5 ACC/AHA STAGE C CONGESTIVE HEART FAILURE DUE TO ISCHEMIC CARDIOMYOPATHY (HCC): Status: ACTIVE | Noted: 2019-11-19

## 2019-11-19 PROBLEM — I50.9 ACC/AHA STAGE C CONGESTIVE HEART FAILURE DUE TO ISCHEMIC CARDIOMYOPATHY (HCC): Status: ACTIVE | Noted: 2019-11-19

## 2019-11-19 PROBLEM — I21.4 ACUTE NON-ST ELEVATION MYOCARDIAL INFARCTION (NSTEMI) (HCC): Status: ACTIVE | Noted: 2019-11-19

## 2019-11-19 LAB
ANION GAP SERPL CALCULATED.3IONS-SCNC: 13 MMOL/L (ref 9–17)
BNP INTERPRETATION: ABNORMAL
BUN BLDV-MCNC: 33 MG/DL (ref 8–23)
BUN/CREAT BLD: 23 (ref 9–20)
CALCIUM SERPL-MCNC: 9 MG/DL (ref 8.6–10.4)
CHLORIDE BLD-SCNC: 94 MMOL/L (ref 98–107)
CHOLESTEROL/HDL RATIO: 3
CHOLESTEROL: 225 MG/DL
CO2: 27 MMOL/L (ref 20–31)
CREAT SERPL-MCNC: 1.46 MG/DL (ref 0.5–0.9)
CULTURE: ABNORMAL
EKG ATRIAL RATE: 90 BPM
EKG P AXIS: 77 DEGREES
EKG P-R INTERVAL: 140 MS
EKG Q-T INTERVAL: 414 MS
EKG QRS DURATION: 68 MS
EKG QTC CALCULATION (BAZETT): 506 MS
EKG R AXIS: -12 DEGREES
EKG T AXIS: -135 DEGREES
EKG VENTRICULAR RATE: 90 BPM
GFR AFRICAN AMERICAN: 41 ML/MIN
GFR NON-AFRICAN AMERICAN: 34 ML/MIN
GFR SERPL CREATININE-BSD FRML MDRD: ABNORMAL ML/MIN/{1.73_M2}
GFR SERPL CREATININE-BSD FRML MDRD: ABNORMAL ML/MIN/{1.73_M2}
GLUCOSE BLD-MCNC: 137 MG/DL (ref 70–99)
HCT VFR BLD CALC: 34.8 % (ref 36.3–47.1)
HDLC SERPL-MCNC: 75 MG/DL
HEMOGLOBIN: 11.6 G/DL (ref 11.9–15.1)
LDL CHOLESTEROL: 138 MG/DL (ref 0–130)
Lab: ABNORMAL
MAGNESIUM: 1.8 MG/DL (ref 1.6–2.6)
MCH RBC QN AUTO: 39.7 PG (ref 25.2–33.5)
MCHC RBC AUTO-ENTMCNC: 33.3 G/DL (ref 28.4–34.8)
MCV RBC AUTO: 119.2 FL (ref 82.6–102.9)
NRBC AUTOMATED: 0 PER 100 WBC
PARTIAL THROMBOPLASTIN TIME: 45.4 SEC (ref 23–31)
PARTIAL THROMBOPLASTIN TIME: 65.6 SEC (ref 23–31)
PARTIAL THROMBOPLASTIN TIME: 90 SEC (ref 23–31)
PDW BLD-RTO: 13.3 % (ref 11.8–14.4)
PLATELET # BLD: 169 K/UL (ref 138–453)
PMV BLD AUTO: 8.6 FL (ref 8.1–13.5)
POTASSIUM SERPL-SCNC: 3.7 MMOL/L (ref 3.7–5.3)
PRO-BNP: ABNORMAL PG/ML
RBC # BLD: 2.92 M/UL (ref 3.95–5.11)
SODIUM BLD-SCNC: 134 MMOL/L (ref 135–144)
SPECIMEN DESCRIPTION: ABNORMAL
TRIGL SERPL-MCNC: 62 MG/DL
TSH SERPL DL<=0.05 MIU/L-ACNC: 4.39 MIU/L (ref 0.3–5)
VLDLC SERPL CALC-MCNC: ABNORMAL MG/DL (ref 1–30)
WBC # BLD: 2.7 K/UL (ref 3.5–11.3)

## 2019-11-19 PROCEDURE — 80048 BASIC METABOLIC PNL TOTAL CA: CPT

## 2019-11-19 PROCEDURE — 93010 ELECTROCARDIOGRAM REPORT: CPT | Performed by: INTERNAL MEDICINE

## 2019-11-19 PROCEDURE — 6370000000 HC RX 637 (ALT 250 FOR IP): Performed by: NURSE PRACTITIONER

## 2019-11-19 PROCEDURE — 85730 THROMBOPLASTIN TIME PARTIAL: CPT

## 2019-11-19 PROCEDURE — 83735 ASSAY OF MAGNESIUM: CPT

## 2019-11-19 PROCEDURE — 2700000000 HC OXYGEN THERAPY PER DAY

## 2019-11-19 PROCEDURE — 85027 COMPLETE CBC AUTOMATED: CPT

## 2019-11-19 PROCEDURE — 94761 N-INVAS EAR/PLS OXIMETRY MLT: CPT

## 2019-11-19 PROCEDURE — 84443 ASSAY THYROID STIM HORMONE: CPT

## 2019-11-19 PROCEDURE — 6370000000 HC RX 637 (ALT 250 FOR IP): Performed by: INTERNAL MEDICINE

## 2019-11-19 PROCEDURE — 2580000003 HC RX 258: Performed by: NURSE PRACTITIONER

## 2019-11-19 PROCEDURE — 83880 ASSAY OF NATRIURETIC PEPTIDE: CPT

## 2019-11-19 PROCEDURE — 94640 AIRWAY INHALATION TREATMENT: CPT

## 2019-11-19 PROCEDURE — 36415 COLL VENOUS BLD VENIPUNCTURE: CPT

## 2019-11-19 PROCEDURE — 6360000002 HC RX W HCPCS: Performed by: INTERNAL MEDICINE

## 2019-11-19 PROCEDURE — 93005 ELECTROCARDIOGRAM TRACING: CPT | Performed by: INTERNAL MEDICINE

## 2019-11-19 PROCEDURE — 2000000000 HC ICU R&B

## 2019-11-19 PROCEDURE — 71046 X-RAY EXAM CHEST 2 VIEWS: CPT

## 2019-11-19 PROCEDURE — 6360000002 HC RX W HCPCS: Performed by: NURSE PRACTITIONER

## 2019-11-19 PROCEDURE — 99232 SBSQ HOSP IP/OBS MODERATE 35: CPT | Performed by: INTERNAL MEDICINE

## 2019-11-19 PROCEDURE — 80061 LIPID PANEL: CPT

## 2019-11-19 RX ORDER — ASPIRIN 81 MG/1
81 TABLET, CHEWABLE ORAL DAILY
Status: DISCONTINUED | OUTPATIENT
Start: 2019-11-19 | End: 2019-11-25 | Stop reason: HOSPADM

## 2019-11-19 RX ORDER — CARVEDILOL 3.12 MG/1
3.12 TABLET ORAL 2 TIMES DAILY WITH MEALS
Status: DISCONTINUED | OUTPATIENT
Start: 2019-11-19 | End: 2019-11-25 | Stop reason: HOSPADM

## 2019-11-19 RX ORDER — MAGNESIUM SULFATE 1 G/100ML
1 INJECTION INTRAVENOUS
Status: COMPLETED | OUTPATIENT
Start: 2019-11-19 | End: 2019-11-19

## 2019-11-19 RX ADMIN — IPRATROPIUM BROMIDE AND ALBUTEROL SULFATE 1 AMPULE: .5; 3 SOLUTION RESPIRATORY (INHALATION) at 07:48

## 2019-11-19 RX ADMIN — FUROSEMIDE 40 MG: 10 INJECTION, SOLUTION INTRAMUSCULAR; INTRAVENOUS at 09:02

## 2019-11-19 RX ADMIN — IPRATROPIUM BROMIDE AND ALBUTEROL SULFATE 1 AMPULE: .5; 3 SOLUTION RESPIRATORY (INHALATION) at 15:02

## 2019-11-19 RX ADMIN — SODIUM CHLORIDE, PRESERVATIVE FREE 10 ML: 5 INJECTION INTRAVENOUS at 09:01

## 2019-11-19 RX ADMIN — MAGNESIUM SULFATE HEPTAHYDRATE 1 G: 1 INJECTION, SOLUTION INTRAVENOUS at 12:45

## 2019-11-19 RX ADMIN — ESCITALOPRAM OXALATE 5 MG: 10 TABLET ORAL at 21:21

## 2019-11-19 RX ADMIN — IPRATROPIUM BROMIDE AND ALBUTEROL SULFATE 1 AMPULE: .5; 3 SOLUTION RESPIRATORY (INHALATION) at 10:55

## 2019-11-19 RX ADMIN — CARVEDILOL 3.12 MG: 3.12 TABLET, FILM COATED ORAL at 18:03

## 2019-11-19 RX ADMIN — HEPARIN SODIUM AND DEXTROSE 14 UNITS/KG/HR: 10000; 5 INJECTION INTRAVENOUS at 04:10

## 2019-11-19 RX ADMIN — ONDANSETRON 4 MG: 2 INJECTION INTRAMUSCULAR; INTRAVENOUS at 19:34

## 2019-11-19 RX ADMIN — FUROSEMIDE 40 MG: 10 INJECTION, SOLUTION INTRAMUSCULAR; INTRAVENOUS at 17:10

## 2019-11-19 RX ADMIN — PROCHLORPERAZINE MALEATE 5 MG: 5 TABLET, FILM COATED ORAL at 21:20

## 2019-11-19 RX ADMIN — EXEMESTANE 25 MG: 25 TABLET ORAL at 09:00

## 2019-11-19 RX ADMIN — PANTOPRAZOLE SODIUM 40 MG: 40 TABLET, DELAYED RELEASE ORAL at 06:09

## 2019-11-19 RX ADMIN — PREDNISONE 5 MG: 5 TABLET ORAL at 09:01

## 2019-11-19 RX ADMIN — PROCHLORPERAZINE MALEATE 5 MG: 5 TABLET, FILM COATED ORAL at 09:01

## 2019-11-19 RX ADMIN — ESCITALOPRAM OXALATE 5 MG: 10 TABLET ORAL at 09:00

## 2019-11-19 RX ADMIN — MAGNESIUM SULFATE HEPTAHYDRATE 1 G: 1 INJECTION, SOLUTION INTRAVENOUS at 13:48

## 2019-11-19 RX ADMIN — ASPIRIN 81 MG 81 MG: 81 TABLET ORAL at 12:45

## 2019-11-19 RX ADMIN — SODIUM CHLORIDE, PRESERVATIVE FREE 10 ML: 5 INJECTION INTRAVENOUS at 21:19

## 2019-11-19 RX ADMIN — HEPARIN SODIUM 1630 UNITS: 1000 INJECTION INTRAVENOUS; SUBCUTANEOUS at 12:54

## 2019-11-20 LAB
ABSOLUTE EOS #: 0 K/UL (ref 0–0.4)
ABSOLUTE IMMATURE GRANULOCYTE: 0 K/UL (ref 0–0.3)
ABSOLUTE LYMPH #: 0.57 K/UL (ref 1–4.8)
ABSOLUTE MONO #: 0.21 K/UL (ref 0.2–0.8)
ANION GAP SERPL CALCULATED.3IONS-SCNC: 12 MMOL/L (ref 9–17)
BASOPHILS # BLD: 0 %
BASOPHILS ABSOLUTE: 0 K/UL (ref 0–0.2)
BUN BLDV-MCNC: 35 MG/DL (ref 8–23)
BUN/CREAT BLD: 25 (ref 9–20)
CALCIUM SERPL-MCNC: 8.8 MG/DL (ref 8.6–10.4)
CHLORIDE BLD-SCNC: 94 MMOL/L (ref 98–107)
CO2: 30 MMOL/L (ref 20–31)
CREAT SERPL-MCNC: 1.41 MG/DL (ref 0.5–0.9)
DIFFERENTIAL TYPE: ABNORMAL
EOSINOPHILS RELATIVE PERCENT: 0 % (ref 1–4)
GFR AFRICAN AMERICAN: 43 ML/MIN
GFR NON-AFRICAN AMERICAN: 36 ML/MIN
GFR SERPL CREATININE-BSD FRML MDRD: ABNORMAL ML/MIN/{1.73_M2}
GFR SERPL CREATININE-BSD FRML MDRD: ABNORMAL ML/MIN/{1.73_M2}
GLUCOSE BLD-MCNC: 99 MG/DL (ref 70–99)
HCT VFR BLD CALC: 37.8 % (ref 36.3–47.1)
HEMOGLOBIN: 12.7 G/DL (ref 11.9–15.1)
IMMATURE GRANULOCYTES: 0 %
LYMPHOCYTES # BLD: 22 % (ref 24–44)
MAGNESIUM: 2.1 MG/DL (ref 1.6–2.6)
MCH RBC QN AUTO: 40.2 PG (ref 25.2–33.5)
MCHC RBC AUTO-ENTMCNC: 33.6 G/DL (ref 28.4–34.8)
MCV RBC AUTO: 119.6 FL (ref 82.6–102.9)
MONOCYTES # BLD: 8 % (ref 1–7)
MORPHOLOGY: ABNORMAL
NRBC AUTOMATED: 0 PER 100 WBC
PARTIAL THROMBOPLASTIN TIME: 59 SEC (ref 23–31)
PDW BLD-RTO: 13.4 % (ref 11.8–14.4)
PLATELET # BLD: 173 K/UL (ref 138–453)
PLATELET ESTIMATE: ABNORMAL
PMV BLD AUTO: 9 FL (ref 8.1–13.5)
POTASSIUM SERPL-SCNC: 4.1 MMOL/L (ref 3.7–5.3)
RBC # BLD: 3.16 M/UL (ref 3.95–5.11)
RBC # BLD: ABNORMAL 10*6/UL
SEG NEUTROPHILS: 70 % (ref 36–66)
SEGMENTED NEUTROPHILS ABSOLUTE COUNT: 1.82 K/UL (ref 1.8–7.7)
SODIUM BLD-SCNC: 136 MMOL/L (ref 135–144)
WBC # BLD: 2.6 K/UL (ref 3.5–11.3)
WBC # BLD: ABNORMAL 10*3/UL

## 2019-11-20 PROCEDURE — 2580000003 HC RX 258: Performed by: NURSE PRACTITIONER

## 2019-11-20 PROCEDURE — 6360000002 HC RX W HCPCS: Performed by: NURSE PRACTITIONER

## 2019-11-20 PROCEDURE — 97163 PT EVAL HIGH COMPLEX 45 MIN: CPT

## 2019-11-20 PROCEDURE — 94640 AIRWAY INHALATION TREATMENT: CPT

## 2019-11-20 PROCEDURE — 2000000000 HC ICU R&B

## 2019-11-20 PROCEDURE — 6370000000 HC RX 637 (ALT 250 FOR IP): Performed by: INTERNAL MEDICINE

## 2019-11-20 PROCEDURE — 85730 THROMBOPLASTIN TIME PARTIAL: CPT

## 2019-11-20 PROCEDURE — 6370000000 HC RX 637 (ALT 250 FOR IP): Performed by: NURSE PRACTITIONER

## 2019-11-20 PROCEDURE — 83735 ASSAY OF MAGNESIUM: CPT

## 2019-11-20 PROCEDURE — 99232 SBSQ HOSP IP/OBS MODERATE 35: CPT | Performed by: INTERNAL MEDICINE

## 2019-11-20 PROCEDURE — 85025 COMPLETE CBC W/AUTO DIFF WBC: CPT

## 2019-11-20 PROCEDURE — 2700000000 HC OXYGEN THERAPY PER DAY

## 2019-11-20 PROCEDURE — 36415 COLL VENOUS BLD VENIPUNCTURE: CPT

## 2019-11-20 PROCEDURE — 80048 BASIC METABOLIC PNL TOTAL CA: CPT

## 2019-11-20 PROCEDURE — 97167 OT EVAL HIGH COMPLEX 60 MIN: CPT

## 2019-11-20 PROCEDURE — 97535 SELF CARE MNGMENT TRAINING: CPT

## 2019-11-20 PROCEDURE — 97530 THERAPEUTIC ACTIVITIES: CPT

## 2019-11-20 PROCEDURE — 94761 N-INVAS EAR/PLS OXIMETRY MLT: CPT

## 2019-11-20 RX ORDER — PROMETHAZINE HYDROCHLORIDE 25 MG/ML
12.5 INJECTION, SOLUTION INTRAMUSCULAR; INTRAVENOUS EVERY 6 HOURS PRN
Status: DISCONTINUED | OUTPATIENT
Start: 2019-11-20 | End: 2019-11-25 | Stop reason: HOSPADM

## 2019-11-20 RX ADMIN — PREDNISONE 5 MG: 5 TABLET ORAL at 09:02

## 2019-11-20 RX ADMIN — IPRATROPIUM BROMIDE AND ALBUTEROL SULFATE 1 AMPULE: .5; 3 SOLUTION RESPIRATORY (INHALATION) at 15:38

## 2019-11-20 RX ADMIN — HEPARIN SODIUM AND DEXTROSE 13 UNITS/KG/HR: 10000; 5 INJECTION INTRAVENOUS at 06:27

## 2019-11-20 RX ADMIN — APIXABAN 2.5 MG: 2.5 TABLET, FILM COATED ORAL at 21:27

## 2019-11-20 RX ADMIN — FUROSEMIDE 40 MG: 10 INJECTION, SOLUTION INTRAMUSCULAR; INTRAVENOUS at 09:03

## 2019-11-20 RX ADMIN — ESCITALOPRAM OXALATE 5 MG: 10 TABLET ORAL at 21:27

## 2019-11-20 RX ADMIN — IPRATROPIUM BROMIDE AND ALBUTEROL SULFATE 1 AMPULE: .5; 3 SOLUTION RESPIRATORY (INHALATION) at 11:20

## 2019-11-20 RX ADMIN — ONDANSETRON 4 MG: 2 INJECTION INTRAMUSCULAR; INTRAVENOUS at 22:23

## 2019-11-20 RX ADMIN — PROCHLORPERAZINE MALEATE 5 MG: 5 TABLET, FILM COATED ORAL at 09:02

## 2019-11-20 RX ADMIN — ESCITALOPRAM OXALATE 5 MG: 10 TABLET ORAL at 09:02

## 2019-11-20 RX ADMIN — SODIUM CHLORIDE, PRESERVATIVE FREE 10 ML: 5 INJECTION INTRAVENOUS at 21:28

## 2019-11-20 RX ADMIN — ASPIRIN 81 MG 81 MG: 81 TABLET ORAL at 09:02

## 2019-11-20 RX ADMIN — APIXABAN 2.5 MG: 2.5 TABLET, FILM COATED ORAL at 09:18

## 2019-11-20 RX ADMIN — EXEMESTANE 25 MG: 25 TABLET ORAL at 09:02

## 2019-11-20 RX ADMIN — PROCHLORPERAZINE MALEATE 5 MG: 5 TABLET, FILM COATED ORAL at 21:27

## 2019-11-20 RX ADMIN — PANTOPRAZOLE SODIUM 40 MG: 40 TABLET, DELAYED RELEASE ORAL at 09:03

## 2019-11-20 RX ADMIN — CARVEDILOL 3.12 MG: 3.12 TABLET, FILM COATED ORAL at 09:02

## 2019-11-20 RX ADMIN — FUROSEMIDE 40 MG: 10 INJECTION, SOLUTION INTRAMUSCULAR; INTRAVENOUS at 21:27

## 2019-11-20 RX ADMIN — IPRATROPIUM BROMIDE AND ALBUTEROL SULFATE 1 AMPULE: .5; 3 SOLUTION RESPIRATORY (INHALATION) at 07:54

## 2019-11-20 RX ADMIN — PROMETHAZINE HYDROCHLORIDE 12.5 MG: 25 INJECTION INTRAMUSCULAR; INTRAVENOUS at 23:27

## 2019-11-20 RX ADMIN — CARVEDILOL 3.12 MG: 3.12 TABLET, FILM COATED ORAL at 17:12

## 2019-11-20 ASSESSMENT — PAIN SCALES - GENERAL
PAINLEVEL_OUTOF10: 0

## 2019-11-21 LAB
ABSOLUTE EOS #: 0 K/UL (ref 0–0.4)
ABSOLUTE IMMATURE GRANULOCYTE: 0 K/UL (ref 0–0.3)
ABSOLUTE LYMPH #: 0.54 K/UL (ref 1–4.8)
ABSOLUTE MONO #: 0.12 K/UL (ref 0.2–0.8)
ANION GAP SERPL CALCULATED.3IONS-SCNC: 11 MMOL/L (ref 9–17)
BASOPHILS # BLD: 0 %
BASOPHILS ABSOLUTE: 0 K/UL (ref 0–0.2)
BNP INTERPRETATION: ABNORMAL
BUN BLDV-MCNC: 37 MG/DL (ref 8–23)
BUN/CREAT BLD: 26 (ref 9–20)
CALCIUM SERPL-MCNC: 9.4 MG/DL (ref 8.6–10.4)
CHLORIDE BLD-SCNC: 89 MMOL/L (ref 98–107)
CO2: 36 MMOL/L (ref 20–31)
CREAT SERPL-MCNC: 1.4 MG/DL (ref 0.5–0.9)
DIFFERENTIAL TYPE: ABNORMAL
EKG ATRIAL RATE: 71 BPM
EKG P AXIS: 91 DEGREES
EKG P-R INTERVAL: 128 MS
EKG Q-T INTERVAL: 490 MS
EKG QRS DURATION: 90 MS
EKG QTC CALCULATION (BAZETT): 532 MS
EKG R AXIS: -15 DEGREES
EKG T AXIS: -155 DEGREES
EKG VENTRICULAR RATE: 71 BPM
EOSINOPHILS RELATIVE PERCENT: 0 % (ref 1–4)
GFR AFRICAN AMERICAN: 43 ML/MIN
GFR NON-AFRICAN AMERICAN: 36 ML/MIN
GFR SERPL CREATININE-BSD FRML MDRD: ABNORMAL ML/MIN/{1.73_M2}
GFR SERPL CREATININE-BSD FRML MDRD: ABNORMAL ML/MIN/{1.73_M2}
GLUCOSE BLD-MCNC: 102 MG/DL (ref 70–99)
HCT VFR BLD CALC: 37.2 % (ref 36.3–47.1)
HEMOGLOBIN: 12.5 G/DL (ref 11.9–15.1)
IMMATURE GRANULOCYTES: 0 %
LYMPHOCYTES # BLD: 27 % (ref 24–44)
MAGNESIUM: 2 MG/DL (ref 1.6–2.6)
MCH RBC QN AUTO: 39.7 PG (ref 25.2–33.5)
MCHC RBC AUTO-ENTMCNC: 33.6 G/DL (ref 28.4–34.8)
MCV RBC AUTO: 118.1 FL (ref 82.6–102.9)
MONOCYTES # BLD: 6 % (ref 1–7)
MORPHOLOGY: ABNORMAL
NRBC AUTOMATED: 0 PER 100 WBC
PARTIAL THROMBOPLASTIN TIME: 25.2 SEC (ref 23–31)
PDW BLD-RTO: 13.3 % (ref 11.8–14.4)
PLATELET # BLD: 150 K/UL (ref 138–453)
PLATELET ESTIMATE: ABNORMAL
PMV BLD AUTO: 9.3 FL (ref 8.1–13.5)
POTASSIUM SERPL-SCNC: 4.1 MMOL/L (ref 3.7–5.3)
PRO-BNP: ABNORMAL PG/ML
RBC # BLD: 3.15 M/UL (ref 3.95–5.11)
RBC # BLD: ABNORMAL 10*6/UL
SEG NEUTROPHILS: 67 % (ref 36–66)
SEGMENTED NEUTROPHILS ABSOLUTE COUNT: 1.34 K/UL (ref 1.8–7.7)
SODIUM BLD-SCNC: 136 MMOL/L (ref 135–144)
WBC # BLD: 2 K/UL (ref 3.5–11.3)
WBC # BLD: ABNORMAL 10*3/UL

## 2019-11-21 PROCEDURE — 6370000000 HC RX 637 (ALT 250 FOR IP): Performed by: INTERNAL MEDICINE

## 2019-11-21 PROCEDURE — 85025 COMPLETE CBC W/AUTO DIFF WBC: CPT

## 2019-11-21 PROCEDURE — 97110 THERAPEUTIC EXERCISES: CPT

## 2019-11-21 PROCEDURE — 97116 GAIT TRAINING THERAPY: CPT

## 2019-11-21 PROCEDURE — 97535 SELF CARE MNGMENT TRAINING: CPT | Performed by: NURSE PRACTITIONER

## 2019-11-21 PROCEDURE — 2580000003 HC RX 258: Performed by: NURSE PRACTITIONER

## 2019-11-21 PROCEDURE — 80048 BASIC METABOLIC PNL TOTAL CA: CPT

## 2019-11-21 PROCEDURE — 83735 ASSAY OF MAGNESIUM: CPT

## 2019-11-21 PROCEDURE — 97530 THERAPEUTIC ACTIVITIES: CPT | Performed by: NURSE PRACTITIONER

## 2019-11-21 PROCEDURE — 83880 ASSAY OF NATRIURETIC PEPTIDE: CPT

## 2019-11-21 PROCEDURE — 99232 SBSQ HOSP IP/OBS MODERATE 35: CPT | Performed by: INTERNAL MEDICINE

## 2019-11-21 PROCEDURE — 93010 ELECTROCARDIOGRAM REPORT: CPT | Performed by: INTERNAL MEDICINE

## 2019-11-21 PROCEDURE — 97530 THERAPEUTIC ACTIVITIES: CPT

## 2019-11-21 PROCEDURE — 6370000000 HC RX 637 (ALT 250 FOR IP): Performed by: NURSE PRACTITIONER

## 2019-11-21 PROCEDURE — 2060000000 HC ICU INTERMEDIATE R&B

## 2019-11-21 PROCEDURE — 6360000002 HC RX W HCPCS: Performed by: NURSE PRACTITIONER

## 2019-11-21 PROCEDURE — 36415 COLL VENOUS BLD VENIPUNCTURE: CPT

## 2019-11-21 PROCEDURE — 97140 MANUAL THERAPY 1/> REGIONS: CPT | Performed by: NURSE PRACTITIONER

## 2019-11-21 PROCEDURE — 85730 THROMBOPLASTIN TIME PARTIAL: CPT

## 2019-11-21 RX ORDER — FUROSEMIDE 40 MG/1
40 TABLET ORAL DAILY
Status: DISCONTINUED | OUTPATIENT
Start: 2019-11-22 | End: 2019-11-25 | Stop reason: HOSPADM

## 2019-11-21 RX ADMIN — SODIUM CHLORIDE, PRESERVATIVE FREE 10 ML: 5 INJECTION INTRAVENOUS at 10:55

## 2019-11-21 RX ADMIN — SODIUM CHLORIDE, PRESERVATIVE FREE 10 ML: 5 INJECTION INTRAVENOUS at 20:45

## 2019-11-21 RX ADMIN — ESCITALOPRAM OXALATE 5 MG: 10 TABLET ORAL at 20:44

## 2019-11-21 RX ADMIN — FUROSEMIDE 40 MG: 10 INJECTION, SOLUTION INTRAMUSCULAR; INTRAVENOUS at 09:03

## 2019-11-21 RX ADMIN — PROCHLORPERAZINE MALEATE 5 MG: 5 TABLET, FILM COATED ORAL at 09:04

## 2019-11-21 RX ADMIN — ESCITALOPRAM OXALATE 5 MG: 10 TABLET ORAL at 09:04

## 2019-11-21 RX ADMIN — ASPIRIN 81 MG 81 MG: 81 TABLET ORAL at 09:04

## 2019-11-21 RX ADMIN — SODIUM CHLORIDE, PRESERVATIVE FREE 10 ML: 5 INJECTION INTRAVENOUS at 16:31

## 2019-11-21 RX ADMIN — EXEMESTANE 25 MG: 25 TABLET ORAL at 09:05

## 2019-11-21 RX ADMIN — APIXABAN 2.5 MG: 2.5 TABLET, FILM COATED ORAL at 09:04

## 2019-11-21 RX ADMIN — APIXABAN 2.5 MG: 2.5 TABLET, FILM COATED ORAL at 20:44

## 2019-11-21 RX ADMIN — PREDNISONE 5 MG: 5 TABLET ORAL at 09:04

## 2019-11-21 RX ADMIN — ONDANSETRON 4 MG: 2 INJECTION INTRAMUSCULAR; INTRAVENOUS at 10:50

## 2019-11-21 RX ADMIN — CARVEDILOL 3.12 MG: 3.12 TABLET, FILM COATED ORAL at 16:33

## 2019-11-21 RX ADMIN — PANTOPRAZOLE SODIUM 40 MG: 40 TABLET, DELAYED RELEASE ORAL at 09:11

## 2019-11-22 LAB
ABSOLUTE EOS #: 0 K/UL (ref 0–0.4)
ABSOLUTE IMMATURE GRANULOCYTE: 0 K/UL (ref 0–0.3)
ABSOLUTE LYMPH #: 0.43 K/UL (ref 1–4.8)
ABSOLUTE MONO #: 0.1 K/UL (ref 0.2–0.8)
ANION GAP SERPL CALCULATED.3IONS-SCNC: 10 MMOL/L (ref 9–17)
BASOPHILS # BLD: 1 %
BASOPHILS ABSOLUTE: 0.02 K/UL (ref 0–0.2)
BNP INTERPRETATION: ABNORMAL
BUN BLDV-MCNC: 39 MG/DL (ref 8–23)
BUN/CREAT BLD: 30 (ref 9–20)
CALCIUM SERPL-MCNC: 9.2 MG/DL (ref 8.6–10.4)
CHLORIDE BLD-SCNC: 90 MMOL/L (ref 98–107)
CO2: 35 MMOL/L (ref 20–31)
CREAT SERPL-MCNC: 1.28 MG/DL (ref 0.5–0.9)
DIFFERENTIAL TYPE: ABNORMAL
EOSINOPHILS RELATIVE PERCENT: 0 % (ref 1–4)
GFR AFRICAN AMERICAN: 48 ML/MIN
GFR NON-AFRICAN AMERICAN: 40 ML/MIN
GFR SERPL CREATININE-BSD FRML MDRD: ABNORMAL ML/MIN/{1.73_M2}
GFR SERPL CREATININE-BSD FRML MDRD: ABNORMAL ML/MIN/{1.73_M2}
GLUCOSE BLD-MCNC: 92 MG/DL (ref 70–99)
HCT VFR BLD CALC: 38.5 % (ref 36.3–47.1)
HEMOGLOBIN: 12.4 G/DL (ref 11.9–15.1)
IMMATURE GRANULOCYTES: 0 %
LYMPHOCYTES # BLD: 27 % (ref 24–44)
MAGNESIUM: 2 MG/DL (ref 1.6–2.6)
MCH RBC QN AUTO: 39.9 PG (ref 25.2–33.5)
MCHC RBC AUTO-ENTMCNC: 32.2 G/DL (ref 28.4–34.8)
MCV RBC AUTO: 123.8 FL (ref 82.6–102.9)
MONOCYTES # BLD: 6 % (ref 1–7)
MORPHOLOGY: ABNORMAL
NRBC AUTOMATED: ABNORMAL PER 100 WBC
NUCLEATED RED BLOOD CELLS: 1 PER 100 WBC
PARTIAL THROMBOPLASTIN TIME: 26.5 SEC (ref 23–31)
PDW BLD-RTO: 13.4 % (ref 11.8–14.4)
PLATELET # BLD: 117 K/UL (ref 138–453)
PLATELET ESTIMATE: ABNORMAL
PMV BLD AUTO: 9.5 FL (ref 8.1–13.5)
POTASSIUM SERPL-SCNC: 4.3 MMOL/L (ref 3.7–5.3)
PRO-BNP: ABNORMAL PG/ML
RBC # BLD: 3.11 M/UL (ref 3.95–5.11)
RBC # BLD: ABNORMAL 10*6/UL
SEG NEUTROPHILS: 66 % (ref 36–66)
SEGMENTED NEUTROPHILS ABSOLUTE COUNT: 1.05 K/UL (ref 1.8–7.7)
SODIUM BLD-SCNC: 135 MMOL/L (ref 135–144)
WBC # BLD: 1.6 K/UL (ref 3.5–11.3)
WBC # BLD: ABNORMAL 10*3/UL

## 2019-11-22 PROCEDURE — 97535 SELF CARE MNGMENT TRAINING: CPT

## 2019-11-22 PROCEDURE — 6370000000 HC RX 637 (ALT 250 FOR IP): Performed by: NURSE PRACTITIONER

## 2019-11-22 PROCEDURE — 2580000003 HC RX 258: Performed by: NURSE PRACTITIONER

## 2019-11-22 PROCEDURE — 36415 COLL VENOUS BLD VENIPUNCTURE: CPT

## 2019-11-22 PROCEDURE — 97530 THERAPEUTIC ACTIVITIES: CPT

## 2019-11-22 PROCEDURE — 83735 ASSAY OF MAGNESIUM: CPT

## 2019-11-22 PROCEDURE — 99232 SBSQ HOSP IP/OBS MODERATE 35: CPT | Performed by: INTERNAL MEDICINE

## 2019-11-22 PROCEDURE — 6370000000 HC RX 637 (ALT 250 FOR IP): Performed by: INTERNAL MEDICINE

## 2019-11-22 PROCEDURE — 2060000000 HC ICU INTERMEDIATE R&B

## 2019-11-22 PROCEDURE — 85730 THROMBOPLASTIN TIME PARTIAL: CPT

## 2019-11-22 PROCEDURE — 83880 ASSAY OF NATRIURETIC PEPTIDE: CPT

## 2019-11-22 PROCEDURE — 85025 COMPLETE CBC W/AUTO DIFF WBC: CPT

## 2019-11-22 PROCEDURE — 80048 BASIC METABOLIC PNL TOTAL CA: CPT

## 2019-11-22 RX ADMIN — ESCITALOPRAM OXALATE 5 MG: 10 TABLET ORAL at 09:07

## 2019-11-22 RX ADMIN — FUROSEMIDE 40 MG: 40 TABLET ORAL at 09:07

## 2019-11-22 RX ADMIN — APIXABAN 2.5 MG: 2.5 TABLET, FILM COATED ORAL at 20:58

## 2019-11-22 RX ADMIN — CARVEDILOL 3.12 MG: 3.12 TABLET, FILM COATED ORAL at 09:07

## 2019-11-22 RX ADMIN — ESCITALOPRAM OXALATE 5 MG: 10 TABLET ORAL at 20:58

## 2019-11-22 RX ADMIN — CARVEDILOL 3.12 MG: 3.12 TABLET, FILM COATED ORAL at 17:48

## 2019-11-22 RX ADMIN — EXEMESTANE 25 MG: 25 TABLET ORAL at 09:07

## 2019-11-22 RX ADMIN — SODIUM CHLORIDE, PRESERVATIVE FREE 10 ML: 5 INJECTION INTRAVENOUS at 09:11

## 2019-11-22 RX ADMIN — PROCHLORPERAZINE MALEATE 5 MG: 5 TABLET, FILM COATED ORAL at 20:58

## 2019-11-22 RX ADMIN — APIXABAN 2.5 MG: 2.5 TABLET, FILM COATED ORAL at 09:10

## 2019-11-22 RX ADMIN — PANTOPRAZOLE SODIUM 40 MG: 40 TABLET, DELAYED RELEASE ORAL at 06:07

## 2019-11-22 RX ADMIN — ASPIRIN 81 MG 81 MG: 81 TABLET ORAL at 09:07

## 2019-11-22 RX ADMIN — PREDNISONE 5 MG: 5 TABLET ORAL at 09:07

## 2019-11-22 RX ADMIN — SODIUM CHLORIDE, PRESERVATIVE FREE 10 ML: 5 INJECTION INTRAVENOUS at 20:58

## 2019-11-22 RX ADMIN — PROCHLORPERAZINE MALEATE 5 MG: 5 TABLET, FILM COATED ORAL at 09:07

## 2019-11-22 ASSESSMENT — PAIN SCALES - GENERAL: PAINLEVEL_OUTOF10: 0

## 2019-11-23 LAB
-: NORMAL
ABSOLUTE EOS #: 0.04 K/UL (ref 0–0.44)
ABSOLUTE IMMATURE GRANULOCYTE: 0.02 K/UL (ref 0–0.3)
ABSOLUTE LYMPH #: 0.56 K/UL (ref 1.1–3.7)
ABSOLUTE MONO #: 0.14 K/UL (ref 0.1–1.2)
ANION GAP SERPL CALCULATED.3IONS-SCNC: 9 MMOL/L (ref 9–17)
BASOPHILS # BLD: 1 % (ref 0–2)
BASOPHILS ABSOLUTE: 0.02 K/UL (ref 0–0.2)
BNP INTERPRETATION: ABNORMAL
BUN BLDV-MCNC: 45 MG/DL (ref 8–23)
BUN/CREAT BLD: 32 (ref 9–20)
CALCIUM SERPL-MCNC: 9.1 MG/DL (ref 8.6–10.4)
CHLORIDE BLD-SCNC: 92 MMOL/L (ref 98–107)
CO2: 35 MMOL/L (ref 20–31)
CREAT SERPL-MCNC: 1.4 MG/DL (ref 0.5–0.9)
DIFFERENTIAL TYPE: ABNORMAL
EOSINOPHILS RELATIVE PERCENT: 2 % (ref 1–4)
GFR AFRICAN AMERICAN: 43 ML/MIN
GFR NON-AFRICAN AMERICAN: 36 ML/MIN
GFR SERPL CREATININE-BSD FRML MDRD: ABNORMAL ML/MIN/{1.73_M2}
GFR SERPL CREATININE-BSD FRML MDRD: ABNORMAL ML/MIN/{1.73_M2}
GLUCOSE BLD-MCNC: 114 MG/DL (ref 70–99)
HCT VFR BLD CALC: 36 % (ref 36.3–47.1)
HEMOGLOBIN: 12.3 G/DL (ref 11.9–15.1)
IMMATURE GRANULOCYTES: 1 %
LYMPHOCYTES # BLD: 31 % (ref 24–43)
MAGNESIUM: 2 MG/DL (ref 1.6–2.6)
MCH RBC QN AUTO: 40.7 PG (ref 25.2–33.5)
MCHC RBC AUTO-ENTMCNC: 34.2 G/DL (ref 28.4–34.8)
MCV RBC AUTO: 119.2 FL (ref 82.6–102.9)
MONOCYTES # BLD: 8 % (ref 3–12)
MORPHOLOGY: ABNORMAL
NRBC AUTOMATED: 0 PER 100 WBC
PARTIAL THROMBOPLASTIN TIME: 26.5 SEC (ref 23–31)
PDW BLD-RTO: 13.6 % (ref 11.8–14.4)
PLATELET # BLD: 132 K/UL (ref 138–453)
PLATELET ESTIMATE: ABNORMAL
PMV BLD AUTO: 10.4 FL (ref 8.1–13.5)
POTASSIUM SERPL-SCNC: 4.2 MMOL/L (ref 3.7–5.3)
PRO-BNP: 8601 PG/ML
RBC # BLD: 3.02 M/UL (ref 3.95–5.11)
RBC # BLD: ABNORMAL 10*6/UL
REASON FOR REJECTION: NORMAL
SEG NEUTROPHILS: 57 % (ref 36–65)
SEGMENTED NEUTROPHILS ABSOLUTE COUNT: 1.02 K/UL (ref 1.5–8.1)
SODIUM BLD-SCNC: 136 MMOL/L (ref 135–144)
WBC # BLD: 1.8 K/UL (ref 3.5–11.3)
WBC # BLD: ABNORMAL 10*3/UL
ZZ NTE CLEAN UP: ORDERED TEST: NORMAL
ZZ NTE WITH NAME CLEAN UP: SPECIMEN SOURCE: NORMAL

## 2019-11-23 PROCEDURE — 97535 SELF CARE MNGMENT TRAINING: CPT

## 2019-11-23 PROCEDURE — 85730 THROMBOPLASTIN TIME PARTIAL: CPT

## 2019-11-23 PROCEDURE — 97110 THERAPEUTIC EXERCISES: CPT

## 2019-11-23 PROCEDURE — 6370000000 HC RX 637 (ALT 250 FOR IP): Performed by: INTERNAL MEDICINE

## 2019-11-23 PROCEDURE — 6370000000 HC RX 637 (ALT 250 FOR IP): Performed by: NURSE PRACTITIONER

## 2019-11-23 PROCEDURE — 2580000003 HC RX 258: Performed by: NURSE PRACTITIONER

## 2019-11-23 PROCEDURE — 83880 ASSAY OF NATRIURETIC PEPTIDE: CPT

## 2019-11-23 PROCEDURE — 85025 COMPLETE CBC W/AUTO DIFF WBC: CPT

## 2019-11-23 PROCEDURE — 83735 ASSAY OF MAGNESIUM: CPT

## 2019-11-23 PROCEDURE — 36415 COLL VENOUS BLD VENIPUNCTURE: CPT

## 2019-11-23 PROCEDURE — 80048 BASIC METABOLIC PNL TOTAL CA: CPT

## 2019-11-23 PROCEDURE — 99232 SBSQ HOSP IP/OBS MODERATE 35: CPT | Performed by: FAMILY MEDICINE

## 2019-11-23 PROCEDURE — 2060000000 HC ICU INTERMEDIATE R&B

## 2019-11-23 PROCEDURE — 97116 GAIT TRAINING THERAPY: CPT

## 2019-11-23 PROCEDURE — 97530 THERAPEUTIC ACTIVITIES: CPT

## 2019-11-23 RX ADMIN — PREDNISONE 5 MG: 5 TABLET ORAL at 09:57

## 2019-11-23 RX ADMIN — ASPIRIN 81 MG 81 MG: 81 TABLET ORAL at 09:57

## 2019-11-23 RX ADMIN — CARVEDILOL 3.12 MG: 3.12 TABLET, FILM COATED ORAL at 16:58

## 2019-11-23 RX ADMIN — PROCHLORPERAZINE MALEATE 5 MG: 5 TABLET, FILM COATED ORAL at 09:57

## 2019-11-23 RX ADMIN — APIXABAN 2.5 MG: 2.5 TABLET, FILM COATED ORAL at 09:58

## 2019-11-23 RX ADMIN — APIXABAN 2.5 MG: 2.5 TABLET, FILM COATED ORAL at 20:28

## 2019-11-23 RX ADMIN — SODIUM CHLORIDE, PRESERVATIVE FREE 10 ML: 5 INJECTION INTRAVENOUS at 09:58

## 2019-11-23 RX ADMIN — SODIUM CHLORIDE, PRESERVATIVE FREE 10 ML: 5 INJECTION INTRAVENOUS at 20:29

## 2019-11-23 RX ADMIN — FUROSEMIDE 40 MG: 40 TABLET ORAL at 09:57

## 2019-11-23 RX ADMIN — ESCITALOPRAM OXALATE 5 MG: 10 TABLET ORAL at 20:28

## 2019-11-23 RX ADMIN — PANTOPRAZOLE SODIUM 40 MG: 40 TABLET, DELAYED RELEASE ORAL at 08:11

## 2019-11-23 RX ADMIN — EXEMESTANE 25 MG: 25 TABLET ORAL at 09:57

## 2019-11-23 RX ADMIN — PROCHLORPERAZINE MALEATE 5 MG: 5 TABLET, FILM COATED ORAL at 20:28

## 2019-11-23 RX ADMIN — ESCITALOPRAM OXALATE 5 MG: 10 TABLET ORAL at 09:57

## 2019-11-23 RX ADMIN — CARVEDILOL 3.12 MG: 3.12 TABLET, FILM COATED ORAL at 08:11

## 2019-11-23 ASSESSMENT — PAIN SCALES - GENERAL
PAINLEVEL_OUTOF10: 0
PAINLEVEL_OUTOF10: 0

## 2019-11-23 ASSESSMENT — ENCOUNTER SYMPTOMS: SHORTNESS OF BREATH: 1

## 2019-11-24 PROBLEM — E44.0 MODERATE MALNUTRITION (HCC): Chronic | Status: ACTIVE | Noted: 2019-11-24

## 2019-11-24 LAB
ABSOLUTE EOS #: 0.02 K/UL (ref 0–0.44)
ABSOLUTE IMMATURE GRANULOCYTE: 0 K/UL (ref 0–0.3)
ABSOLUTE LYMPH #: 0.61 K/UL (ref 1.1–3.7)
ABSOLUTE MONO #: 0.16 K/UL (ref 0.1–1.2)
ANION GAP SERPL CALCULATED.3IONS-SCNC: 9 MMOL/L (ref 9–17)
BASOPHILS # BLD: 1 % (ref 0–2)
BASOPHILS ABSOLUTE: 0.02 K/UL (ref 0–0.2)
BUN BLDV-MCNC: 41 MG/DL (ref 8–23)
BUN/CREAT BLD: 31 (ref 9–20)
CALCIUM SERPL-MCNC: 9 MG/DL (ref 8.6–10.4)
CHLORIDE BLD-SCNC: 89 MMOL/L (ref 98–107)
CO2: 36 MMOL/L (ref 20–31)
CREAT SERPL-MCNC: 1.33 MG/DL (ref 0.5–0.9)
DIFFERENTIAL TYPE: ABNORMAL
EOSINOPHILS RELATIVE PERCENT: 1 % (ref 1–4)
GFR AFRICAN AMERICAN: 46 ML/MIN
GFR NON-AFRICAN AMERICAN: 38 ML/MIN
GFR SERPL CREATININE-BSD FRML MDRD: ABNORMAL ML/MIN/{1.73_M2}
GFR SERPL CREATININE-BSD FRML MDRD: ABNORMAL ML/MIN/{1.73_M2}
GLUCOSE BLD-MCNC: 99 MG/DL (ref 70–99)
HCT VFR BLD CALC: 34 % (ref 36.3–47.1)
HEMOGLOBIN: 11.3 G/DL (ref 11.9–15.1)
IMMATURE GRANULOCYTES: 0 %
LYMPHOCYTES # BLD: 38 % (ref 24–43)
MCH RBC QN AUTO: 40.1 PG (ref 25.2–33.5)
MCHC RBC AUTO-ENTMCNC: 33.2 G/DL (ref 28.4–34.8)
MCV RBC AUTO: 120.6 FL (ref 82.6–102.9)
MONOCYTES # BLD: 10 % (ref 3–12)
MORPHOLOGY: ABNORMAL
NRBC AUTOMATED: 0 PER 100 WBC
PARTIAL THROMBOPLASTIN TIME: 26.3 SEC (ref 23–31)
PDW BLD-RTO: 13.4 % (ref 11.8–14.4)
PLATELET # BLD: 88 K/UL (ref 138–453)
PLATELET # BLD: 93 K/UL (ref 138–453)
PLATELET ESTIMATE: ABNORMAL
PMV BLD AUTO: 10 FL (ref 8.1–13.5)
POTASSIUM SERPL-SCNC: 4 MMOL/L (ref 3.7–5.3)
RBC # BLD: 2.82 M/UL (ref 3.95–5.11)
RBC # BLD: ABNORMAL 10*6/UL
SEG NEUTROPHILS: 50 % (ref 36–65)
SEGMENTED NEUTROPHILS ABSOLUTE COUNT: 0.79 K/UL (ref 1.5–8.1)
SODIUM BLD-SCNC: 134 MMOL/L (ref 135–144)
WBC # BLD: 1.6 K/UL (ref 3.5–11.3)
WBC # BLD: ABNORMAL 10*3/UL

## 2019-11-24 PROCEDURE — 6370000000 HC RX 637 (ALT 250 FOR IP): Performed by: NURSE PRACTITIONER

## 2019-11-24 PROCEDURE — 85025 COMPLETE CBC W/AUTO DIFF WBC: CPT

## 2019-11-24 PROCEDURE — 6370000000 HC RX 637 (ALT 250 FOR IP): Performed by: INTERNAL MEDICINE

## 2019-11-24 PROCEDURE — 80048 BASIC METABOLIC PNL TOTAL CA: CPT

## 2019-11-24 PROCEDURE — 2580000003 HC RX 258: Performed by: NURSE PRACTITIONER

## 2019-11-24 PROCEDURE — 99232 SBSQ HOSP IP/OBS MODERATE 35: CPT | Performed by: FAMILY MEDICINE

## 2019-11-24 PROCEDURE — 85049 AUTOMATED PLATELET COUNT: CPT

## 2019-11-24 PROCEDURE — 2060000000 HC ICU INTERMEDIATE R&B

## 2019-11-24 PROCEDURE — 85730 THROMBOPLASTIN TIME PARTIAL: CPT

## 2019-11-24 PROCEDURE — 36415 COLL VENOUS BLD VENIPUNCTURE: CPT

## 2019-11-24 RX ADMIN — PROCHLORPERAZINE MALEATE 5 MG: 5 TABLET, FILM COATED ORAL at 08:56

## 2019-11-24 RX ADMIN — APIXABAN 2.5 MG: 2.5 TABLET, FILM COATED ORAL at 08:47

## 2019-11-24 RX ADMIN — PROCHLORPERAZINE MALEATE 5 MG: 5 TABLET, FILM COATED ORAL at 21:34

## 2019-11-24 RX ADMIN — EXEMESTANE 25 MG: 25 TABLET ORAL at 09:08

## 2019-11-24 RX ADMIN — CARVEDILOL 3.12 MG: 3.12 TABLET, FILM COATED ORAL at 08:48

## 2019-11-24 RX ADMIN — APIXABAN 2.5 MG: 2.5 TABLET, FILM COATED ORAL at 21:34

## 2019-11-24 RX ADMIN — PANTOPRAZOLE SODIUM 40 MG: 40 TABLET, DELAYED RELEASE ORAL at 06:41

## 2019-11-24 RX ADMIN — ESCITALOPRAM OXALATE 5 MG: 10 TABLET ORAL at 08:48

## 2019-11-24 RX ADMIN — FUROSEMIDE 40 MG: 40 TABLET ORAL at 08:48

## 2019-11-24 RX ADMIN — ASPIRIN 81 MG 81 MG: 81 TABLET ORAL at 08:48

## 2019-11-24 RX ADMIN — SODIUM CHLORIDE, PRESERVATIVE FREE 10 ML: 5 INJECTION INTRAVENOUS at 21:40

## 2019-11-24 RX ADMIN — CARVEDILOL 3.12 MG: 3.12 TABLET, FILM COATED ORAL at 17:43

## 2019-11-24 RX ADMIN — SODIUM CHLORIDE, PRESERVATIVE FREE 10 ML: 5 INJECTION INTRAVENOUS at 08:49

## 2019-11-24 RX ADMIN — ESCITALOPRAM OXALATE 5 MG: 10 TABLET ORAL at 21:34

## 2019-11-24 RX ADMIN — PREDNISONE 5 MG: 5 TABLET ORAL at 08:48

## 2019-11-24 ASSESSMENT — PAIN SCALES - GENERAL: PAINLEVEL_OUTOF10: 0

## 2019-11-24 ASSESSMENT — ENCOUNTER SYMPTOMS: SHORTNESS OF BREATH: 1

## 2019-11-25 VITALS
DIASTOLIC BLOOD PRESSURE: 70 MMHG | WEIGHT: 125.6 LBS | HEART RATE: 64 BPM | OXYGEN SATURATION: 94 % | TEMPERATURE: 97.3 F | BODY MASS INDEX: 21.44 KG/M2 | SYSTOLIC BLOOD PRESSURE: 115 MMHG | RESPIRATION RATE: 21 BRPM | HEIGHT: 64 IN

## 2019-11-25 PROBLEM — N18.9 ACUTE KIDNEY INJURY SUPERIMPOSED ON CKD (HCC): Status: ACTIVE | Noted: 2019-11-25

## 2019-11-25 PROBLEM — N17.9 AKI (ACUTE KIDNEY INJURY) (HCC): Status: RESOLVED | Noted: 2019-11-18 | Resolved: 2019-11-25

## 2019-11-25 PROBLEM — N17.9 ACUTE KIDNEY INJURY SUPERIMPOSED ON CKD (HCC): Status: ACTIVE | Noted: 2019-11-25

## 2019-11-25 PROBLEM — N18.2 CKD (CHRONIC KIDNEY DISEASE) STAGE 2, GFR 60-89 ML/MIN: Status: ACTIVE | Noted: 2019-11-25

## 2019-11-25 LAB
ABSOLUTE EOS #: 0.03 K/UL (ref 0–0.4)
ABSOLUTE IMMATURE GRANULOCYTE: 0 K/UL (ref 0–0.3)
ABSOLUTE LYMPH #: 0.66 K/UL (ref 1–4.8)
ABSOLUTE MONO #: 0.08 K/UL (ref 0.2–0.8)
BASOPHILS # BLD: 1 %
BASOPHILS ABSOLUTE: 0.01 K/UL (ref 0–0.2)
DIFFERENTIAL TYPE: ABNORMAL
EOSINOPHILS RELATIVE PERCENT: 2 % (ref 1–4)
HCT VFR BLD CALC: 33.9 % (ref 36.3–47.1)
HEMOGLOBIN: 11.2 G/DL (ref 11.9–15.1)
IMMATURE GRANULOCYTES: 0 %
LYMPHOCYTES # BLD: 47 % (ref 24–44)
MCH RBC QN AUTO: 39.6 PG (ref 25.2–33.5)
MCHC RBC AUTO-ENTMCNC: 33 G/DL (ref 28.4–34.8)
MCV RBC AUTO: 119.8 FL (ref 82.6–102.9)
MONOCYTES # BLD: 6 % (ref 1–7)
MORPHOLOGY: ABNORMAL
NRBC AUTOMATED: 0 PER 100 WBC
PARTIAL THROMBOPLASTIN TIME: 27.1 SEC (ref 23–31)
PDW BLD-RTO: 13.4 % (ref 11.8–14.4)
PLATELET # BLD: 84 K/UL (ref 138–453)
PLATELET ESTIMATE: ABNORMAL
PMV BLD AUTO: 9.5 FL (ref 8.1–13.5)
RBC # BLD: 2.83 M/UL (ref 3.95–5.11)
RBC # BLD: ABNORMAL 10*6/UL
SEG NEUTROPHILS: 44 % (ref 36–66)
SEGMENTED NEUTROPHILS ABSOLUTE COUNT: 0.62 K/UL (ref 1.8–7.7)
WBC # BLD: 1.4 K/UL (ref 3.5–11.3)
WBC # BLD: ABNORMAL 10*3/UL

## 2019-11-25 PROCEDURE — 36415 COLL VENOUS BLD VENIPUNCTURE: CPT

## 2019-11-25 PROCEDURE — 6370000000 HC RX 637 (ALT 250 FOR IP): Performed by: INTERNAL MEDICINE

## 2019-11-25 PROCEDURE — 2580000003 HC RX 258: Performed by: NURSE PRACTITIONER

## 2019-11-25 PROCEDURE — 85025 COMPLETE CBC W/AUTO DIFF WBC: CPT

## 2019-11-25 PROCEDURE — 97535 SELF CARE MNGMENT TRAINING: CPT

## 2019-11-25 PROCEDURE — 97110 THERAPEUTIC EXERCISES: CPT

## 2019-11-25 PROCEDURE — 6370000000 HC RX 637 (ALT 250 FOR IP): Performed by: NURSE PRACTITIONER

## 2019-11-25 PROCEDURE — 99239 HOSP IP/OBS DSCHRG MGMT >30: CPT | Performed by: INTERNAL MEDICINE

## 2019-11-25 PROCEDURE — 97116 GAIT TRAINING THERAPY: CPT

## 2019-11-25 PROCEDURE — 85730 THROMBOPLASTIN TIME PARTIAL: CPT

## 2019-11-25 RX ORDER — ASPIRIN 81 MG/1
81 TABLET, CHEWABLE ORAL DAILY
Qty: 30 TABLET | Refills: 0 | DISCHARGE
Start: 2019-11-26 | End: 2019-11-25

## 2019-11-25 RX ORDER — ASPIRIN 81 MG/1
81 TABLET, CHEWABLE ORAL DAILY
Qty: 30 TABLET | Refills: 0 | Status: SHIPPED | OUTPATIENT
Start: 2019-11-26

## 2019-11-25 RX ORDER — DOCUSATE SODIUM 100 MG/1
100 CAPSULE, LIQUID FILLED ORAL 2 TIMES DAILY PRN
Qty: 30 CAPSULE | Refills: 0 | Status: SHIPPED | OUTPATIENT
Start: 2019-11-25

## 2019-11-25 RX ORDER — DOCUSATE SODIUM 100 MG/1
100 CAPSULE, LIQUID FILLED ORAL 2 TIMES DAILY PRN
DISCHARGE
Start: 2019-11-25 | End: 2019-11-25

## 2019-11-25 RX ORDER — ONDANSETRON 4 MG/1
4 TABLET, ORALLY DISINTEGRATING ORAL EVERY 6 HOURS PRN
Qty: 30 TABLET | Refills: 0 | Status: SHIPPED | OUTPATIENT
Start: 2019-11-25

## 2019-11-25 RX ORDER — PROCHLORPERAZINE MALEATE 10 MG
5 TABLET ORAL 2 TIMES DAILY PRN
Qty: 60 TABLET | Refills: 3 | DISCHARGE
Start: 2019-11-25

## 2019-11-25 RX ORDER — PROCHLORPERAZINE MALEATE 10 MG
5 TABLET ORAL EVERY 12 HOURS PRN
Status: DISCONTINUED | OUTPATIENT
Start: 2019-11-25 | End: 2019-11-25 | Stop reason: HOSPADM

## 2019-11-25 RX ORDER — CARVEDILOL 3.12 MG/1
3.12 TABLET ORAL 2 TIMES DAILY WITH MEALS
Qty: 60 TABLET | Refills: 0 | Status: SHIPPED | OUTPATIENT
Start: 2019-11-25

## 2019-11-25 RX ORDER — FUROSEMIDE 40 MG/1
40 TABLET ORAL DAILY
Qty: 30 TABLET | Refills: 0 | Status: SHIPPED | OUTPATIENT
Start: 2019-11-26

## 2019-11-25 RX ORDER — CARVEDILOL 3.12 MG/1
3.12 TABLET ORAL 2 TIMES DAILY WITH MEALS
Qty: 60 TABLET | Refills: 0 | DISCHARGE
Start: 2019-11-25 | End: 2019-11-25

## 2019-11-25 RX ORDER — FUROSEMIDE 40 MG/1
40 TABLET ORAL DAILY
Qty: 30 TABLET | Refills: 0 | DISCHARGE
Start: 2019-11-26 | End: 2019-11-25

## 2019-11-25 RX ORDER — ONDANSETRON 4 MG/1
4 TABLET, ORALLY DISINTEGRATING ORAL EVERY 6 HOURS PRN
Qty: 30 TABLET | Refills: 0 | DISCHARGE
Start: 2019-11-25 | End: 2019-11-25

## 2019-11-25 RX ADMIN — FUROSEMIDE 40 MG: 40 TABLET ORAL at 10:02

## 2019-11-25 RX ADMIN — EXEMESTANE 25 MG: 25 TABLET ORAL at 10:02

## 2019-11-25 RX ADMIN — ESCITALOPRAM OXALATE 5 MG: 10 TABLET ORAL at 10:01

## 2019-11-25 RX ADMIN — PREDNISONE 5 MG: 5 TABLET ORAL at 10:01

## 2019-11-25 RX ADMIN — PROCHLORPERAZINE MALEATE 5 MG: 5 TABLET, FILM COATED ORAL at 10:01

## 2019-11-25 RX ADMIN — CARVEDILOL 3.12 MG: 3.12 TABLET, FILM COATED ORAL at 17:28

## 2019-11-25 RX ADMIN — PANTOPRAZOLE SODIUM 40 MG: 40 TABLET, DELAYED RELEASE ORAL at 05:59

## 2019-11-25 RX ADMIN — SODIUM CHLORIDE, PRESERVATIVE FREE 10 ML: 5 INJECTION INTRAVENOUS at 10:05

## 2019-11-25 RX ADMIN — CARVEDILOL 3.12 MG: 3.12 TABLET, FILM COATED ORAL at 10:04

## 2019-11-25 RX ADMIN — APIXABAN 2.5 MG: 2.5 TABLET, FILM COATED ORAL at 10:01

## 2019-11-25 RX ADMIN — ASPIRIN 81 MG 81 MG: 81 TABLET ORAL at 10:01

## 2019-11-27 ENCOUNTER — CARE COORDINATION (OUTPATIENT)
Dept: CASE MANAGEMENT | Age: 84
End: 2019-11-27

## 2019-12-02 ENCOUNTER — HOSPITAL ENCOUNTER (OUTPATIENT)
Age: 84
Setting detail: SPECIMEN
Discharge: HOME | End: 2019-12-02
Payer: MEDICARE

## 2019-12-06 ENCOUNTER — HOSPITAL ENCOUNTER (OUTPATIENT)
Age: 84
Setting detail: SPECIMEN
Discharge: HOME | End: 2019-12-06
Payer: MEDICARE

## 2019-12-06 LAB
-: NORMAL
ABSOLUTE BANDS #: 0.08 K/UL (ref 0–1)
ABSOLUTE EOS #: 0 K/UL (ref 0–0.4)
ABSOLUTE IMMATURE GRANULOCYTE: ABNORMAL K/UL (ref 0–0.3)
ABSOLUTE LYMPH #: 0.35 K/UL (ref 1–4.8)
ABSOLUTE MONO #: 0.28 K/UL (ref 0.1–1.3)
BANDS: 3 % (ref 0–10)
BASOPHILS # BLD: 0 % (ref 0–2)
BASOPHILS ABSOLUTE: 0 K/UL (ref 0–0.2)
DIFFERENTIAL TYPE: ABNORMAL
EOSINOPHILS RELATIVE PERCENT: 0 % (ref 0–4)
HCT VFR BLD CALC: 36.3 % (ref 36–46)
HEMOGLOBIN: 12.4 G/DL (ref 12–16)
IMMATURE GRANULOCYTES: ABNORMAL %
LYMPHOCYTES # BLD: 14 % (ref 24–44)
MCH RBC QN AUTO: 40.5 PG (ref 26–34)
MCHC RBC AUTO-ENTMCNC: 34.1 G/DL (ref 31–37)
MCV RBC AUTO: 118.8 FL (ref 80–100)
MONOCYTES # BLD: 11 % (ref 1–7)
MORPHOLOGY: ABNORMAL
NRBC AUTOMATED: ABNORMAL PER 100 WBC
PATHOLOGIST REVIEW: NORMAL
PDW BLD-RTO: 16 % (ref 11.5–14.9)
PLATELET # BLD: 249 K/UL (ref 150–450)
PLATELET ESTIMATE: ABNORMAL
PMV BLD AUTO: 7.5 FL (ref 6–12)
RBC # BLD: 3.05 M/UL (ref 4–5.2)
RBC # BLD: ABNORMAL 10*6/UL
REASON FOR REJECTION: NORMAL
SEG NEUTROPHILS: 72 % (ref 36–66)
SEGMENTED NEUTROPHILS ABSOLUTE COUNT: 1.79 K/UL (ref 1.3–9.1)
WBC # BLD: 2.5 K/UL (ref 3.5–11)
WBC # BLD: ABNORMAL 10*3/UL
ZZ NTE CLEAN UP: ORDERED TEST: NORMAL
ZZ NTE WITH NAME CLEAN UP: SPECIMEN SOURCE: NORMAL

## 2019-12-06 PROCEDURE — 85025 COMPLETE CBC W/AUTO DIFF WBC: CPT

## 2020-01-08 ENCOUNTER — HOSPITAL ENCOUNTER (OUTPATIENT)
Age: 85
Setting detail: SPECIMEN
Discharge: HOME | End: 2020-01-08
Payer: MEDICARE

## 2020-01-08 LAB
ALBUMIN SERPL-MCNC: 3.3 G/DL (ref 3.5–5.2)
ALBUMIN/GLOBULIN RATIO: ABNORMAL (ref 1–2.5)
ALP BLD-CCNC: 371 U/L (ref 35–104)
ALT SERPL-CCNC: 108 U/L (ref 5–33)
ANION GAP SERPL CALCULATED.3IONS-SCNC: 13 MMOL/L (ref 9–17)
AST SERPL-CCNC: 229 U/L
BILIRUB SERPL-MCNC: 1.55 MG/DL (ref 0.3–1.2)
BUN BLDV-MCNC: 31 MG/DL (ref 8–23)
BUN/CREAT BLD: 29 (ref 9–20)
CALCIUM SERPL-MCNC: 10.4 MG/DL (ref 8.6–10.4)
CHLORIDE BLD-SCNC: 98 MMOL/L (ref 98–107)
CO2: 25 MMOL/L (ref 20–31)
CREAT SERPL-MCNC: 1.08 MG/DL (ref 0.5–0.9)
GFR AFRICAN AMERICAN: 58 ML/MIN
GFR NON-AFRICAN AMERICAN: 48 ML/MIN
GFR SERPL CREATININE-BSD FRML MDRD: ABNORMAL ML/MIN/{1.73_M2}
GFR SERPL CREATININE-BSD FRML MDRD: ABNORMAL ML/MIN/{1.73_M2}
GLUCOSE BLD-MCNC: 97 MG/DL (ref 70–99)
POTASSIUM SERPL-SCNC: 4.3 MMOL/L (ref 3.7–5.3)
SODIUM BLD-SCNC: 136 MMOL/L (ref 135–144)
TOTAL PROTEIN: 6.6 G/DL (ref 6.4–8.3)

## 2020-01-08 PROCEDURE — 80053 COMPREHEN METABOLIC PANEL: CPT

## 2020-01-08 PROCEDURE — 36415 COLL VENOUS BLD VENIPUNCTURE: CPT

## 2020-01-08 PROCEDURE — P9603 ONE-WAY ALLOW PRORATED MILES: HCPCS

## 2021-02-26 NOTE — PROGRESS NOTES
Pt arrives per wheelchair with visitor and orders reviewed and pt tolerated infusion well, no reactions or complaints and blood return present throughout infusion. Pt has next appt , Sleeping through most of infusion. Delayed for arrival to today's appt , due to had PET scan today, pt states. Pt discharged per wheelchair with family.
PROCEDURES:  Microwave ablation, mass, liver 26-Feb-2021 11:21:34  Parveen Peters  Open liver biopsy 26-Feb-2021 11:21:15  Parveen Peters

## 2021-03-10 NOTE — PROGRESS NOTES
Physical Therapy  Facility/Department: [address] ORTHO/MED SURG  Daily Treatment Note  NAME: Alisa Denis  : 1934  MRN: [id number]    Date of Service: 2019    Discharge Recommendations:  Patient would benefit from continued therapy after discharge        Assessment   Body structures, Functions, Activity limitations: Decreased functional mobility ; Decreased balance;Decreased strength;Decreased endurance;Decreased high-level IADLs  Assessment: Improved ambulation distance this AM to 80 feet. Mod assit for bed mobility. PT would benefit from continued PT upon discharge. Prognosis: Good  PT Education: Home Exercise Program;General Safety;Transfer Training;Gait Training;Functional Mobility Training  Patient Education: hip precautions  Activity Tolerance  Activity Tolerance: Patient Tolerated treatment well     Patient Diagnosis(es): The encounter diagnosis was History of right hip hemiarthroplasty. has a past medical history of Anxiousness, Blood clot in vein, Depressed, High cholesterol, History of appetite changes, Invasive ductal carcinoma of breast ([facility] [address] Lymphedema, Nausea & vomiting, Nervous, Wears glasses, and Weight gain. has a past surgical history that includes Tonsillectomy and adenoidectomy (Bilateral, 0); Appendectomy (2000); Cataract removal with implant (Right, 2016); Cataract removal with implant (Left, 2016); Cataract removal with implant (Left, 2015); hip surgery (Right, 2019); Femur fracture surgery (Right, 2019); Hardware Removal (Right, 2019); Total hip arthroplasty (Right, 2019); REMOVE HARDWARE HIP (Right, 2019); and Total hip arthroplasty (Right, 2019).     Restrictions  Restrictions/Precautions  Restrictions/Precautions: Fall Risk, Weight Bearing  Required Braces or Orthoses?: No  Lower Extremity Weight Bearing Restrictions  Right Lower Extremity Weight Bearing: Weight Bearing As Tolerated  Position Activity Restriction  Other position/activity restrictions: WBAT R LE  Subjective   General  Chart Reviewed: Yes  Response To Previous Treatment: Patient with no complaints from previous session. Subjective  Subjective: Pt supine in rest. Minimal discomfort reported. Bed alarm on  General Comment  Comments: Reports increased pain with AMB          Orientation  Orientation  Overall Orientation Status: Within Normal Limits  Cognition      Objective   Bed mobility  Supine to Sit: Moderate assistance  Sit to Supine: Moderate assistance  Scooting: Moderate assistance  Transfers  Sit to Stand: Minimal Assistance  Stand to sit: Contact guard assistance  Ambulation 1  Surface: level tile  Device: Rolling Walker  Assistance: Contact guard assistance  Gait Deviations: Slow Janet;Decreased step height;Decreased step length  Distance: 80 feet x1        Other exercises  Other exercises?: Yes  Other exercises 1: Right HIP: AP, quad sets, glut sets, Heel slides and LAQ x10 reps           Goals  Short term goals  Time Frame for Short term goals: 10 visits  Short term goal 1: transfers with SBA  Short term goal 2: amb 125 ft with a RW x SBA with WBAT R LE  Short term goal 3: ascend/descend 4 steps with SBA  Short term goal 4: exercise program x SBA  Patient Goals   Patient goals : Return home    Plan    Plan  Times per week: BID  Current Treatment Recommendations: Strengthening, Transfer Training, Endurance Training, Gait Training, Functional Mobility Training, Safety Education & Training  Safety Devices  Type of devices:  All fall risk precautions in place, Gait belt, Call light within reach, Left in bed, Bed alarm in place, Patient at risk for falls, Nurse notified  Restraints  Initially in place: No     Therapy Time   Individual Concurrent Group Co-treatment   Time In 0900         Time Out 0940         Minutes [address], [place] [Back Pain] : ~T back pain [Muscle Aches] : muscle aches [NI] : Endocrine [Nl] : Hematologic/Lymphatic [Limping] : no limping [Joint Pains] : no arthralgias [Joint Swelling] : no joint swelling

## 2021-09-22 NOTE — DISCHARGE SUMMARY
4.5mg             NONFORMULARY  Take 2 tablets by mouth daily Indications: metatrol             palbociclib (IBRANCE) 125 MG capsule  Take 125 mg by mouth daily Indications: 21days on 7 days off              pantoprazole (PROTONIX) 40 MG tablet  Take 1 tablet by mouth every morning (before breakfast)             predniSONE (DELTASONE) 10 MG tablet  Take 5 mg by mouth daily              prochlorperazine (COMPAZINE) 5 MG tablet  Take 5 mg by mouth 2 times daily             propranolol (INDERAL) 10 MG tablet  Take 10 mg by mouth daily                 Discharge Instructions: Follow up with Dewey Calderon MD in 3-4 weeks.     Hospital acquired Infections: None    Uriel Sim DO  Orthopedic Surgery Resident, PGY-1  [address] done

## 2022-05-31 NOTE — PROGRESS NOTES
Care Transitions Note:  Patient hospitalized 5/25 with hyponatremia. Pt. Arrives for hydration, denies c/o since last infusion. IV placed on third attempt.   Hydration started to infuse over 2.5 hours  Hydration completed

## (undated) DEVICE — DRAPE C ARM UNIV W41XL74IN CLR PLAS XR VELC CLSR POLY STRP

## (undated) DEVICE — PROTECTOR ULN NRV PUR FOAM HK LOOP STRP ANATOMICALLY

## (undated) DEVICE — BLADE SAW SAG SYS 6 18X90X1.27MM

## (undated) DEVICE — DRAPE,REIN 53X77,STERILE: Brand: MEDLINE

## (undated) DEVICE — TUBE IRRIG HNDPC HI FLO TP INTRPULS W/SUCTION TUBE

## (undated) DEVICE — 3M™ IOBAN™ 2 ANTIMICROBIAL INCISE DRAPE 6650EZ: Brand: IOBAN™ 2

## (undated) DEVICE — Device

## (undated) DEVICE — HOOD: Brand: FLYTE

## (undated) DEVICE — BANDAGE COBAN 4 IN COMPR W4INXL5YD FOAM COHESIVE QUIK STK SELF ADH SFT

## (undated) DEVICE — DRESSING,GAUZE,XEROFORM,CURAD,1"X8",ST: Brand: CURAD

## (undated) DEVICE — 3M™ COBAN™ NL STERILE NON-LATEX SELF-ADHERENT WRAP, 2086S, 6 IN X 5 YD (15 CM X 4,5 M), 12 ROLLS/CASE: Brand: 3M™ COBAN™

## (undated) DEVICE — SUTURE VCRL SZ 2-0 L27IN ABSRB UD L22MM X-1 1/2 CIR REV CUT J459H

## (undated) DEVICE — DRESSING TRNSPAR W5XL4.5IN FLM SHT SEMIPERMEABLE WIND

## (undated) DEVICE — BIT DRL L330MM DIA4.2MM CALIB 100MM 3 FLUT QUIK CPL

## (undated) DEVICE — BOWL AND CEMENT CARTRIDGE WITH BREAKAWAY FEMORAL NOZZLE: Brand: ACM

## (undated) DEVICE — STOCKINETTE,IMPERVIOUS,12X48,STERILE: Brand: MEDLINE

## (undated) DEVICE — GOWN,AURORA,NONRNF,XL,30/CS: Brand: MEDLINE

## (undated) DEVICE — GLOVE ORANGE PI 7   MSG9070

## (undated) DEVICE — C-ARMOR C-ARM EQUIPMENT COVERS CLEAR STERILE UNIVERSAL FIT 12 PER CASE: Brand: C-ARMOR

## (undated) DEVICE — SUTURE VIC + ANTIBACT NDL MO-4 1-0 27 VCP437H

## (undated) DEVICE — GLOVE EXAM SM L95IN FNGR THK35MIL PALM THK24MIL OFF WHT

## (undated) DEVICE — SUTURE VCRL SZ 0 L27IN ABSRB UD L36MM CT-1 1/2 CIR J260H

## (undated) DEVICE — STERILE PVP: Brand: MEDLINE INDUSTRIES, INC.

## (undated) DEVICE — SUTURE NONABSORBABLE  MERSILINE TP1 SIZE 5

## (undated) DEVICE — CHLORAPREP 26ML ORANGE

## (undated) DEVICE — CONTAINER,SPECIMEN,4OZ,OR STRL: Brand: MEDLINE

## (undated) DEVICE — TOTAL TRAY, 16FR 10ML SIL FOLEY, URN: Brand: MEDLINE

## (undated) DEVICE — GLOVE ORANGE PI 8   MSG9080

## (undated) DEVICE — GUIDEWIRE ORTH L400MM DIA3.2MM FOR TFN

## (undated) DEVICE — SVMMC ORTH SPL DRP PK

## (undated) DEVICE — SUTURE VIC + ABS BR UD X1 2-0 27IN VCP459H

## (undated) DEVICE — GLOVE,EXAM,NITRILE,RESTORE,OAT SENSE,L: Brand: MEDLINE

## (undated) DEVICE — GLOVE ORANGE PI 8 1/2   MSG9085

## (undated) DEVICE — STRYKER PERFORMANCE SERIES SAGITTAL BLADE: Brand: STRYKER PERFORMANCE SERIES

## (undated) DEVICE — E-Z CLEAN, NON-STICK, PTFE COATED, ELECTROSURGICAL BLADE ELECTRODE, 6.5 INCH (16.5 CM): Brand: MEGADYNE

## (undated) DEVICE — PREP IM ENCHANCED TOTAL HIP BONE                                    PREPARATION KIT: Brand: PREP-IM

## (undated) DEVICE — COVER,MAYO STAND,STERILE: Brand: MEDLINE

## (undated) DEVICE — 6619 2 PTNT ISO SYS INCISE AREA&LT;(&GT;&&LT;)&GT;P: Brand: STERI-DRAPE™ IOBAN™ 2

## (undated) DEVICE — GLOVE ORANGE PI 7 1/2   MSG9075

## (undated) DEVICE — GLOVE SURG SZ 65 THK91MIL LTX FREE SYN POLYISOPRENE

## (undated) DEVICE — SPONGE LAP W18XL18IN WHT COT 4 PLY FLD STRUNG RADPQ DISP ST

## (undated) DEVICE — SYRINGE IRRIG 60ML SFT PLIABLE BLB EZ TO GRP 1 HND USE W/